# Patient Record
Sex: MALE | Race: BLACK OR AFRICAN AMERICAN | Employment: FULL TIME | URBAN - METROPOLITAN AREA
[De-identification: names, ages, dates, MRNs, and addresses within clinical notes are randomized per-mention and may not be internally consistent; named-entity substitution may affect disease eponyms.]

---

## 2017-05-16 LAB
ALBUMIN SERPL-MCNC: 4.3 G/DL
ALP BLD-CCNC: 70 U/L
ALT SERPL-CCNC: 14 U/L
AST SERPL-CCNC: 15 U/L
BILIRUB SERPL-MCNC: 0.4 MG/DL (ref 0.1–1.4)
BUN BLDV-MCNC: 25 MG/DL
CALCIUM SERPL-MCNC: 9.9 MG/DL
CHLORIDE BLD-SCNC: 94 MMOL/L
CHOLESTEROL, TOTAL: 237 MG/DL
CHOLESTEROL/HDL RATIO: 4.6
CO2: NORMAL MMOL/L
CREAT SERPL-MCNC: 1.49 MG/DL
GFR CALCULATED: NORMAL
GLUCOSE BLD-MCNC: 184 MG/DL
HBA1C MFR BLD: 7.2 %
HDLC SERPL-MCNC: 52 MG/DL (ref 35–70)
LDL CHOLESTEROL CALCULATED: 160 MG/DL (ref 0–160)
POTASSIUM SERPL-SCNC: 4.8 MMOL/L
PROSTATE SPECIFIC ANTIGEN: 16.2 NG/ML
SODIUM BLD-SCNC: 141 MMOL/L
TOTAL PROTEIN: 7.1
TRIGL SERPL-MCNC: 124 MG/DL
URIC ACID: 9.4
VLDLC SERPL CALC-MCNC: 25 MG/DL

## 2017-07-14 ENCOUNTER — OFFICE VISIT (OUTPATIENT)
Dept: FAMILY MEDICINE CLINIC | Age: 57
End: 2017-07-14

## 2017-07-14 VITALS
BODY MASS INDEX: 28.58 KG/M2 | HEART RATE: 100 BPM | DIASTOLIC BLOOD PRESSURE: 70 MMHG | SYSTOLIC BLOOD PRESSURE: 124 MMHG | OXYGEN SATURATION: 95 % | HEIGHT: 70 IN | RESPIRATION RATE: 15 BRPM | WEIGHT: 199.6 LBS

## 2017-07-14 DIAGNOSIS — E11.9 TYPE 2 DIABETES MELLITUS WITHOUT COMPLICATION, WITHOUT LONG-TERM CURRENT USE OF INSULIN (HCC): Primary | ICD-10-CM

## 2017-07-14 DIAGNOSIS — E78.00 PURE HYPERCHOLESTEROLEMIA: Chronic | ICD-10-CM

## 2017-07-14 DIAGNOSIS — Z87.891 FORMER SMOKER: ICD-10-CM

## 2017-07-14 DIAGNOSIS — Z76.89 ENCOUNTER TO ESTABLISH CARE: ICD-10-CM

## 2017-07-14 DIAGNOSIS — Z91.199 MEDICAL NON-COMPLIANCE: ICD-10-CM

## 2017-07-14 DIAGNOSIS — I71.40 ABDOMINAL AORTIC ANEURYSM (AAA) WITHOUT RUPTURE: ICD-10-CM

## 2017-07-14 DIAGNOSIS — Z12.11 SCREENING FOR COLON CANCER: ICD-10-CM

## 2017-07-14 DIAGNOSIS — I10 ESSENTIAL HYPERTENSION: ICD-10-CM

## 2017-07-14 LAB
CREATININE URINE POCT: 300
MICROALBUMIN/CREAT 24H UR: 150 MG/G{CREAT}
MICROALBUMIN/CREAT UR-RTO: NORMAL

## 2017-07-14 PROCEDURE — 82044 UR ALBUMIN SEMIQUANTITATIVE: CPT | Performed by: FAMILY MEDICINE

## 2017-07-14 PROCEDURE — 90715 TDAP VACCINE 7 YRS/> IM: CPT | Performed by: FAMILY MEDICINE

## 2017-07-14 PROCEDURE — 99204 OFFICE O/P NEW MOD 45 MIN: CPT | Performed by: FAMILY MEDICINE

## 2017-07-14 PROCEDURE — 90732 PPSV23 VACC 2 YRS+ SUBQ/IM: CPT | Performed by: FAMILY MEDICINE

## 2017-07-14 PROCEDURE — 90472 IMMUNIZATION ADMIN EACH ADD: CPT | Performed by: FAMILY MEDICINE

## 2017-07-14 PROCEDURE — 90471 IMMUNIZATION ADMIN: CPT | Performed by: FAMILY MEDICINE

## 2017-07-14 RX ORDER — SIMVASTATIN 20 MG
TABLET ORAL
COMMUNITY
Start: 2016-04-05 | End: 2017-10-19 | Stop reason: SDUPTHER

## 2017-07-14 RX ORDER — MELOXICAM 15 MG/1
15 TABLET ORAL
COMMUNITY
Start: 2015-09-23 | End: 2018-10-10 | Stop reason: ALTCHOICE

## 2017-07-14 ASSESSMENT — PATIENT HEALTH QUESTIONNAIRE - PHQ9
1. LITTLE INTEREST OR PLEASURE IN DOING THINGS: 0
SUM OF ALL RESPONSES TO PHQ QUESTIONS 1-9: 0
SUM OF ALL RESPONSES TO PHQ9 QUESTIONS 1 & 2: 0
2. FEELING DOWN, DEPRESSED OR HOPELESS: 0

## 2017-09-22 ENCOUNTER — TELEPHONE (OUTPATIENT)
Dept: FAMILY MEDICINE CLINIC | Age: 57
End: 2017-09-22

## 2017-09-25 RX ORDER — HYDROCHLOROTHIAZIDE 25 MG/1
25 TABLET ORAL DAILY
Qty: 30 TABLET | Refills: 5 | Status: SHIPPED | OUTPATIENT
Start: 2017-09-25 | End: 2018-03-15 | Stop reason: SDUPTHER

## 2017-10-02 ENCOUNTER — TELEPHONE (OUTPATIENT)
Dept: FAMILY MEDICINE CLINIC | Age: 57
End: 2017-10-02

## 2017-10-02 NOTE — TELEPHONE ENCOUNTER
Patient received a letter dated 9/25/17 asking him to call the office but it doesn't say what it is regards to. Can someone help him out?

## 2017-10-02 NOTE — TELEPHONE ENCOUNTER
Looks like we tried to reach him 3x in regards to his tramadol rx.  Looks like Dr. Larsen Overall must fill that for him   Refer to the encounter on 9/15/17

## 2017-10-12 ENCOUNTER — HOSPITAL ENCOUNTER (OUTPATIENT)
Dept: ENDOSCOPY | Age: 57
Discharge: HOME | End: 2017-10-12
Admitting: INTERNAL MEDICINE

## 2017-10-12 LAB
GLUCOSE BLD-MCNC: 125 MG/DL (ref 70–99)
PERFORMED ON: ABNORMAL

## 2017-10-12 NOTE — ANESTHESIA PRE-OP
Department of Anesthesiology  Preprocedure Note       Name:  Abby Madrigal   Age:  59 y.o.  :  1960                                          MRN:  [id number]         Date:  10/12/2017      Surgeon:    Procedure:    Medications prior to admission:   Prior to Admission medications    Medication Sig Start Date End Date Taking? Authorizing Provider   metFORMIN (GLUCOPHAGE) 500 MG tablet Take 1 tablet by mouth 2 times daily (with meals) 17   Jose Sheehan MD   hydrochlorothiazide (HYDRODIURIL) 25 MG tablet Take 1 tablet by mouth daily 17   Jose Sheehan MD   simvastatin (ZOCOR) 20 MG tablet TAKE ONE TABLET BY MOUTH EVERY NIGHT AT BEDTIME 16   Historical Provider, MD   meloxicam (MOBIC) 15 MG tablet Take 15 mg by mouth 9/23/15   Historical Provider, MD   lisinopril (PRINIVIL;ZESTRIL) 30 MG tablet Take 30 mg by mouth daily    Historical Provider, MD   traMADol (ULTRAM) 50 MG tablet Take 50 mg by mouth every 6 hours as needed. Historical Provider, MD       Current medications:    Current Outpatient Prescriptions   Medication Sig Dispense Refill    metFORMIN (GLUCOPHAGE) 500 MG tablet Take 1 tablet by mouth 2 times daily (with meals) 60 tablet 5    hydrochlorothiazide (HYDRODIURIL) 25 MG tablet Take 1 tablet by mouth daily 30 tablet 5    simvastatin (ZOCOR) 20 MG tablet TAKE ONE TABLET BY MOUTH EVERY NIGHT AT BEDTIME      meloxicam (MOBIC) 15 MG tablet Take 15 mg by mouth      lisinopril (PRINIVIL;ZESTRIL) 30 MG tablet Take 30 mg by mouth daily      traMADol (ULTRAM) 50 MG tablet Take 50 mg by mouth every 6 hours as needed. No current facility-administered medications for this encounter.         Allergies:  No Known Allergies    Problem List:    Patient Active Problem List   Diagnosis Code    AAA (abdominal aortic aneurysm) ([facility] I71.4    Type 2 diabetes mellitus without complication ([facility] [facility] P60.2    Hypertension I10    Hyperlipidemia E78.5    Former smoker Z87.891   [employer] non-compliance Z91.19       Past Medical History:        Diagnosis Date    AAA (abdominal aortic aneurysm) ([facility] [address] 11/28/2015    4.2 cm    Arthritis     Back pain     Hyperlipidemia     Hypertension     Type 2 diabetes mellitus without complication [facility])        Past Surgical History:        Procedure Laterality Date    CORONARY ANGIOPLASTY  2013    UPPER GASTROINTESTINAL ENDOSCOPY  11/30/15    with gastric biopsy    URETHRAL STRICTURE DILATATION  5/2017, 7/2017    Dr. Tiffany Mak       Social History:    Social History   Substance Use Topics    Smoking status: Former Smoker     Quit date: 11/29/2009    Smokeless tobacco: Never Used    Alcohol use No                                Counseling given: Not Answered      Vital Signs (Current): There were no vitals filed for this visit. BP Readings from Last 3 Encounters:   07/14/17 124/70   12/02/15 139/79   11/28/15 130/69       NPO Status:                                                                                 BMI:   Wt Readings from Last 3 Encounters:   09/19/17 200 lb (90.7 kg)   07/14/17 199 lb 9.6 oz (90.5 kg)   12/01/15 201 lb 8 oz (91.4 kg)     There is no height or weight on file to calculate BMI. Anesthesia Evaluation  Patient summary reviewed and Nursing notes reviewed  Airway:         Dental:          Pulmonary:                              Cardiovascular:  Exercise tolerance: good (>4 METS),   (+) hypertension: mild,                   Neuro/Psych:               GI/Hepatic/Renal:             Endo/Other:    (+) Type II DM, ,                     Comments: AA 4.2 CM Abdominal:           Vascular:   + PVD, aortic or cerebral, . Anesthesia Plan      MAC     ASA 3             Anesthetic plan and risks discussed with patient. Plan discussed with CRNA.                   Shannan Quiles MD   10/12/2017

## 2017-10-12 NOTE — ANESTHESIA POST-OP
Anesthesia Post-op Note    Patient: Angela Nguyen  MRN: [id number]  YOB: 1960  Date of evaluation: 10/12/2017  Time:  1:18 PM     Procedure(s) Performed:     Last Vitals: There were no vitals taken for this visit.     Washington Phase I:      Washington Phase II:      Anesthesia Post Evaluation    Final anesthesia type: MAC  Patient location during evaluation: outpatient unit  Level of consciousness: awake and alert  Complications: no  Hydration status: euvolemic        Mai Mccoy MD  1:18 PM

## 2017-10-19 ENCOUNTER — OFFICE VISIT (OUTPATIENT)
Dept: FAMILY MEDICINE CLINIC | Age: 57
End: 2017-10-19

## 2017-10-19 VITALS
WEIGHT: 202.6 LBS | BODY MASS INDEX: 29.92 KG/M2 | DIASTOLIC BLOOD PRESSURE: 70 MMHG | OXYGEN SATURATION: 98 % | RESPIRATION RATE: 15 BRPM | HEART RATE: 69 BPM | SYSTOLIC BLOOD PRESSURE: 126 MMHG

## 2017-10-19 DIAGNOSIS — R97.20 ELEVATED PSA: ICD-10-CM

## 2017-10-19 DIAGNOSIS — I10 ESSENTIAL HYPERTENSION: ICD-10-CM

## 2017-10-19 DIAGNOSIS — G63 POLYNEUROPATHY ASSOCIATED WITH UNDERLYING DISEASE (HCC): ICD-10-CM

## 2017-10-19 DIAGNOSIS — I71.40 ABDOMINAL AORTIC ANEURYSM (AAA) WITHOUT RUPTURE: ICD-10-CM

## 2017-10-19 DIAGNOSIS — E78.5 HYPERLIPIDEMIA, UNSPECIFIED HYPERLIPIDEMIA TYPE: ICD-10-CM

## 2017-10-19 DIAGNOSIS — E11.42 TYPE 2 DIABETES MELLITUS WITH DIABETIC POLYNEUROPATHY, WITHOUT LONG-TERM CURRENT USE OF INSULIN (HCC): Primary | ICD-10-CM

## 2017-10-19 LAB — HBA1C MFR BLD: 7.4 %

## 2017-10-19 PROCEDURE — 99214 OFFICE O/P EST MOD 30 MIN: CPT | Performed by: FAMILY MEDICINE

## 2017-10-19 PROCEDURE — 83036 HEMOGLOBIN GLYCOSYLATED A1C: CPT | Performed by: FAMILY MEDICINE

## 2017-10-19 RX ORDER — LISINOPRIL 30 MG/1
30 TABLET ORAL DAILY
Qty: 30 TABLET | Refills: 5 | Status: SHIPPED | OUTPATIENT
Start: 2017-10-19 | End: 2018-03-15 | Stop reason: SDUPTHER

## 2017-10-19 RX ORDER — SIMVASTATIN 20 MG
TABLET ORAL
Qty: 30 TABLET | Refills: 5 | Status: SHIPPED | OUTPATIENT
Start: 2017-10-19 | End: 2018-03-15 | Stop reason: SDUPTHER

## 2017-12-04 DIAGNOSIS — R97.20 ELEVATED PSA: ICD-10-CM

## 2017-12-04 DIAGNOSIS — E11.42 TYPE 2 DIABETES MELLITUS WITH DIABETIC POLYNEUROPATHY, WITHOUT LONG-TERM CURRENT USE OF INSULIN (HCC): ICD-10-CM

## 2017-12-04 DIAGNOSIS — E78.5 HYPERLIPIDEMIA, UNSPECIFIED HYPERLIPIDEMIA TYPE: ICD-10-CM

## 2017-12-04 DIAGNOSIS — I10 ESSENTIAL HYPERTENSION: ICD-10-CM

## 2017-12-04 LAB
A/G RATIO: 1.7 (ref 1.1–2.2)
ALBUMIN SERPL-MCNC: 4.1 G/DL (ref 3.4–5)
ALP BLD-CCNC: 59 U/L (ref 40–129)
ALT SERPL-CCNC: 15 U/L (ref 10–40)
ANION GAP SERPL CALCULATED.3IONS-SCNC: 14 MMOL/L (ref 3–16)
AST SERPL-CCNC: 16 U/L (ref 15–37)
BASOPHILS ABSOLUTE: 0.1 K/UL (ref 0–0.2)
BASOPHILS RELATIVE PERCENT: 1 %
BILIRUB SERPL-MCNC: <0.2 MG/DL (ref 0–1)
BUN BLDV-MCNC: 26 MG/DL (ref 7–20)
CALCIUM SERPL-MCNC: 9.2 MG/DL (ref 8.3–10.6)
CHLORIDE BLD-SCNC: 101 MMOL/L (ref 99–110)
CHOLESTEROL, TOTAL: 215 MG/DL (ref 0–199)
CO2: 27 MMOL/L (ref 21–32)
CREAT SERPL-MCNC: 0.9 MG/DL (ref 0.9–1.3)
EOSINOPHILS ABSOLUTE: 0.2 K/UL (ref 0–0.6)
EOSINOPHILS RELATIVE PERCENT: 2.1 %
GFR AFRICAN AMERICAN: >60
GFR NON-AFRICAN AMERICAN: >60
GLOBULIN: 2.4 G/DL
GLUCOSE BLD-MCNC: 156 MG/DL (ref 70–99)
HCT VFR BLD CALC: 40.4 % (ref 40.5–52.5)
HDLC SERPL-MCNC: 45 MG/DL (ref 40–60)
HEMOGLOBIN: 13.2 G/DL (ref 13.5–17.5)
LDL CHOLESTEROL CALCULATED: 140 MG/DL
LYMPHOCYTES ABSOLUTE: 3.2 K/UL (ref 1–5.1)
LYMPHOCYTES RELATIVE PERCENT: 38.8 %
MCH RBC QN AUTO: 28.3 PG (ref 26–34)
MCHC RBC AUTO-ENTMCNC: 32.7 G/DL (ref 31–36)
MCV RBC AUTO: 86.7 FL (ref 80–100)
MONOCYTES ABSOLUTE: 0.6 K/UL (ref 0–1.3)
MONOCYTES RELATIVE PERCENT: 7.3 %
NEUTROPHILS ABSOLUTE: 4.2 K/UL (ref 1.7–7.7)
NEUTROPHILS RELATIVE PERCENT: 50.8 %
PDW BLD-RTO: 15.3 % (ref 12.4–15.4)
PLATELET # BLD: 170 K/UL (ref 135–450)
PMV BLD AUTO: 11 FL (ref 5–10.5)
POTASSIUM SERPL-SCNC: 5 MMOL/L (ref 3.5–5.1)
PROSTATE SPECIFIC ANTIGEN: 0.89 NG/ML (ref 0–4)
RBC # BLD: 4.66 M/UL (ref 4.2–5.9)
SODIUM BLD-SCNC: 142 MMOL/L (ref 136–145)
TOTAL PROTEIN: 6.5 G/DL (ref 6.4–8.2)
TRIGL SERPL-MCNC: 152 MG/DL (ref 0–150)
VLDLC SERPL CALC-MCNC: 30 MG/DL
WBC # BLD: 8.2 K/UL (ref 4–11)

## 2017-12-05 DIAGNOSIS — E11.42 TYPE 2 DIABETES MELLITUS WITH DIABETIC POLYNEUROPATHY, WITHOUT LONG-TERM CURRENT USE OF INSULIN (HCC): Primary | ICD-10-CM

## 2017-12-05 LAB
ESTIMATED AVERAGE GLUCOSE: 159.9 MG/DL
HBA1C MFR BLD: 7.2 %

## 2018-03-15 ENCOUNTER — OFFICE VISIT (OUTPATIENT)
Dept: FAMILY MEDICINE CLINIC | Age: 58
End: 2018-03-15

## 2018-03-15 VITALS
SYSTOLIC BLOOD PRESSURE: 152 MMHG | DIASTOLIC BLOOD PRESSURE: 76 MMHG | WEIGHT: 201.8 LBS | HEART RATE: 74 BPM | BODY MASS INDEX: 29.8 KG/M2 | OXYGEN SATURATION: 97 %

## 2018-03-15 DIAGNOSIS — I71.40 ABDOMINAL AORTIC ANEURYSM (AAA) WITHOUT RUPTURE: ICD-10-CM

## 2018-03-15 DIAGNOSIS — Z91.199 MEDICAL NON-COMPLIANCE: ICD-10-CM

## 2018-03-15 DIAGNOSIS — E11.42 TYPE 2 DIABETES MELLITUS WITH DIABETIC POLYNEUROPATHY, WITHOUT LONG-TERM CURRENT USE OF INSULIN (HCC): Primary | ICD-10-CM

## 2018-03-15 DIAGNOSIS — E78.5 HYPERLIPIDEMIA, UNSPECIFIED HYPERLIPIDEMIA TYPE: ICD-10-CM

## 2018-03-15 DIAGNOSIS — I10 ESSENTIAL HYPERTENSION: ICD-10-CM

## 2018-03-15 PROBLEM — R97.20 ELEVATED PSA: Status: RESOLVED | Noted: 2017-10-19 | Resolved: 2018-03-15

## 2018-03-15 LAB — HBA1C MFR BLD: 6.6 %

## 2018-03-15 PROCEDURE — 83036 HEMOGLOBIN GLYCOSYLATED A1C: CPT | Performed by: FAMILY MEDICINE

## 2018-03-15 PROCEDURE — 99214 OFFICE O/P EST MOD 30 MIN: CPT | Performed by: FAMILY MEDICINE

## 2018-03-15 RX ORDER — LISINOPRIL 40 MG/1
40 TABLET ORAL DAILY
Qty: 30 TABLET | Refills: 5 | Status: SHIPPED | OUTPATIENT
Start: 2018-03-15 | End: 2018-08-15 | Stop reason: SDUPTHER

## 2018-03-15 RX ORDER — HYDROCHLOROTHIAZIDE 25 MG/1
25 TABLET ORAL DAILY
Qty: 30 TABLET | Refills: 5 | Status: SHIPPED | OUTPATIENT
Start: 2018-03-15 | End: 2018-08-15 | Stop reason: SDUPTHER

## 2018-03-15 RX ORDER — SIMVASTATIN 20 MG
TABLET ORAL
Qty: 30 TABLET | Refills: 5 | Status: SHIPPED | OUTPATIENT
Start: 2018-03-15 | End: 2018-08-15 | Stop reason: SDUPTHER

## 2018-03-15 NOTE — PROGRESS NOTES
had AAA imaging in 2.5 years. Discussed importance of f/u imaging and size/prognosis of his AAA. Pt to do CT asap  5.  Continue ASA daily

## 2018-03-19 ENCOUNTER — TELEPHONE (OUTPATIENT)
Dept: FAMILY MEDICINE CLINIC | Age: 58
End: 2018-03-19

## 2018-03-19 DIAGNOSIS — I10 ESSENTIAL HYPERTENSION: Primary | ICD-10-CM

## 2018-03-28 ENCOUNTER — HOSPITAL ENCOUNTER (OUTPATIENT)
Dept: OTHER | Age: 58
Discharge: HOME | End: 2018-03-28
Attending: FAMILY MEDICINE | Admitting: FAMILY MEDICINE

## 2018-03-28 DIAGNOSIS — I71.40 ABDOMINAL AORTIC ANEURYSM (AAA) WITHOUT RUPTURE: ICD-10-CM

## 2018-03-28 DIAGNOSIS — I70.0 ATHEROSCLEROSIS OF AORTA (HCC): ICD-10-CM

## 2018-03-28 DIAGNOSIS — I71.40 ABDOMINAL AORTIC ANEURYSM (AAA) WITHOUT RUPTURE: Primary | ICD-10-CM

## 2018-03-28 DIAGNOSIS — I71.23 ANEURYSM OF DESCENDING THORACIC AORTA: ICD-10-CM

## 2018-03-28 LAB
CREAT SERPL-MCNC: 0.9 MG/DL (ref 0.8–1.3)
GFR AFRICAN AMERICAN: >60
GFR NON-AFRICAN AMERICAN: >60

## 2018-04-20 ENCOUNTER — OFFICE VISIT (OUTPATIENT)
Dept: VASCULAR SURGERY | Age: 58
End: 2018-04-20

## 2018-04-20 VITALS
SYSTOLIC BLOOD PRESSURE: 128 MMHG | WEIGHT: 198 LBS | BODY MASS INDEX: 28.35 KG/M2 | DIASTOLIC BLOOD PRESSURE: 80 MMHG | HEIGHT: 70 IN

## 2018-04-20 DIAGNOSIS — I71.40 AAA (ABDOMINAL AORTIC ANEURYSM) WITHOUT RUPTURE: Primary | ICD-10-CM

## 2018-04-20 PROCEDURE — 99203 OFFICE O/P NEW LOW 30 MIN: CPT | Performed by: SURGERY

## 2018-05-07 LAB
ALBUMIN SERPL-MCNC: 4.4 G/DL
ALP BLD-CCNC: 59 U/L
ALT SERPL-CCNC: 16 U/L
ANION GAP SERPL CALCULATED.3IONS-SCNC: NORMAL MMOL/L
AST SERPL-CCNC: 14 U/L
AVERAGE GLUCOSE: NORMAL
BASOPHILS ABSOLUTE: ABNORMAL /ΜL
BASOPHILS RELATIVE PERCENT: ABNORMAL %
BILIRUB SERPL-MCNC: 0.4 MG/DL (ref 0.1–1.4)
BUN BLDV-MCNC: 22 MG/DL
CALCIUM SERPL-MCNC: 9.9 MG/DL
CHLORIDE BLD-SCNC: 99 MMOL/L
CHOLESTEROL, TOTAL: 193 MG/DL
CHOLESTEROL/HDL RATIO: 3.7
CO2: NORMAL MMOL/L
CREAT SERPL-MCNC: 1.04 MG/DL
EOSINOPHILS ABSOLUTE: ABNORMAL /ΜL
EOSINOPHILS RELATIVE PERCENT: ABNORMAL %
GFR CALCULATED: NORMAL
GLUCOSE BLD-MCNC: 150 MG/DL
HBA1C MFR BLD: 6.6 %
HCT VFR BLD CALC: 37.9 % (ref 41–53)
HDLC SERPL-MCNC: 52 MG/DL (ref 35–70)
HEMOGLOBIN: 12.4 G/DL (ref 13.5–17.5)
IRON: 27
LDL CHOLESTEROL CALCULATED: 121 MG/DL (ref 0–160)
LYMPHOCYTES ABSOLUTE: ABNORMAL /ΜL
LYMPHOCYTES RELATIVE PERCENT: ABNORMAL %
MCH RBC QN AUTO: 28.2 PG
MCHC RBC AUTO-ENTMCNC: 32.7 G/DL
MCV RBC AUTO: 86 FL
MONOCYTES ABSOLUTE: ABNORMAL /ΜL
MONOCYTES RELATIVE PERCENT: ABNORMAL %
NEUTROPHILS ABSOLUTE: ABNORMAL /ΜL
NEUTROPHILS RELATIVE PERCENT: ABNORMAL %
PLATELET # BLD: 198 K/ΜL
PMV BLD AUTO: ABNORMAL FL
POTASSIUM SERPL-SCNC: 4.5 MMOL/L
PROSTATE SPECIFIC ANTIGEN: 23.4 NG/ML
RBC # BLD: 4.39 10^6/ΜL
SODIUM BLD-SCNC: 140 MMOL/L
TOTAL PROTEIN: 6.9
TRIGL SERPL-MCNC: 99 MG/DL
TSH SERPL DL<=0.05 MIU/L-ACNC: 0.95 UIU/ML
URIC ACID: 6.8
VLDLC SERPL CALC-MCNC: 20 MG/DL
WBC # BLD: 12.5 10^3/ML

## 2018-05-17 ENCOUNTER — OFFICE VISIT (OUTPATIENT)
Dept: FAMILY MEDICINE CLINIC | Age: 58
End: 2018-05-17

## 2018-05-17 VITALS
WEIGHT: 200.2 LBS | SYSTOLIC BLOOD PRESSURE: 130 MMHG | BODY MASS INDEX: 28.73 KG/M2 | DIASTOLIC BLOOD PRESSURE: 72 MMHG | HEART RATE: 92 BPM | OXYGEN SATURATION: 97 %

## 2018-05-17 DIAGNOSIS — Z00.00 ANNUAL PHYSICAL EXAM: Primary | ICD-10-CM

## 2018-05-17 DIAGNOSIS — E78.5 HYPERLIPIDEMIA, UNSPECIFIED HYPERLIPIDEMIA TYPE: ICD-10-CM

## 2018-05-17 DIAGNOSIS — R97.20 ELEVATED PSA: ICD-10-CM

## 2018-05-17 DIAGNOSIS — E11.42 TYPE 2 DIABETES MELLITUS WITH DIABETIC POLYNEUROPATHY, WITHOUT LONG-TERM CURRENT USE OF INSULIN (HCC): ICD-10-CM

## 2018-05-17 DIAGNOSIS — I10 ESSENTIAL HYPERTENSION: ICD-10-CM

## 2018-05-17 DIAGNOSIS — N41.0 ACUTE PROSTATITIS: ICD-10-CM

## 2018-05-17 DIAGNOSIS — I71.40 ABDOMINAL AORTIC ANEURYSM (AAA) WITHOUT RUPTURE: ICD-10-CM

## 2018-05-17 LAB
BILIRUBIN, POC: NORMAL
BLOOD URINE, POC: NORMAL
CLARITY, POC: CLEAR
COLOR, POC: YELLOW
GLUCOSE URINE, POC: NORMAL
KETONES, POC: NORMAL
LEUKOCYTE EST, POC: NORMAL
NITRITE, POC: NORMAL
PH, POC: 6
PROTEIN, POC: NORMAL
SPECIFIC GRAVITY, POC: >=1.03
UROBILINOGEN, POC: 0.2

## 2018-05-17 PROCEDURE — 99214 OFFICE O/P EST MOD 30 MIN: CPT | Performed by: FAMILY MEDICINE

## 2018-05-17 PROCEDURE — 81002 URINALYSIS NONAUTO W/O SCOPE: CPT | Performed by: FAMILY MEDICINE

## 2018-05-17 PROCEDURE — 99396 PREV VISIT EST AGE 40-64: CPT | Performed by: FAMILY MEDICINE

## 2018-05-17 RX ORDER — CIPROFLOXACIN 500 MG/1
500 TABLET, FILM COATED ORAL 2 TIMES DAILY
Qty: 20 TABLET | Refills: 0 | Status: SHIPPED | OUTPATIENT
Start: 2018-05-17 | End: 2018-05-27

## 2018-08-15 ENCOUNTER — OFFICE VISIT (OUTPATIENT)
Dept: FAMILY MEDICINE CLINIC | Age: 58
End: 2018-08-15

## 2018-08-15 VITALS
TEMPERATURE: 98.1 F | SYSTOLIC BLOOD PRESSURE: 120 MMHG | WEIGHT: 196 LBS | HEART RATE: 86 BPM | DIASTOLIC BLOOD PRESSURE: 80 MMHG | BODY MASS INDEX: 28.12 KG/M2 | OXYGEN SATURATION: 95 %

## 2018-08-15 DIAGNOSIS — R35.1 NOCTURIA: ICD-10-CM

## 2018-08-15 DIAGNOSIS — I10 ESSENTIAL HYPERTENSION: ICD-10-CM

## 2018-08-15 DIAGNOSIS — R30.0 DYSURIA: Primary | ICD-10-CM

## 2018-08-15 DIAGNOSIS — E78.5 HYPERLIPIDEMIA, UNSPECIFIED HYPERLIPIDEMIA TYPE: ICD-10-CM

## 2018-08-15 DIAGNOSIS — R97.20 ELEVATED PSA: ICD-10-CM

## 2018-08-15 PROCEDURE — 99214 OFFICE O/P EST MOD 30 MIN: CPT | Performed by: FAMILY MEDICINE

## 2018-08-15 RX ORDER — SIMVASTATIN 20 MG
TABLET ORAL
Qty: 30 TABLET | Refills: 5 | Status: SHIPPED | OUTPATIENT
Start: 2018-08-15 | End: 2018-10-10 | Stop reason: ALTCHOICE

## 2018-08-15 RX ORDER — SULFAMETHOXAZOLE AND TRIMETHOPRIM 800; 160 MG/1; MG/1
1 TABLET ORAL 2 TIMES DAILY
Qty: 84 TABLET | Refills: 0 | Status: SHIPPED | OUTPATIENT
Start: 2018-08-15 | End: 2018-08-28

## 2018-08-15 RX ORDER — LISINOPRIL 40 MG/1
40 TABLET ORAL DAILY
Qty: 30 TABLET | Refills: 5 | Status: SHIPPED | OUTPATIENT
Start: 2018-08-15 | End: 2018-09-17 | Stop reason: SDUPTHER

## 2018-08-15 RX ORDER — HYDROCHLOROTHIAZIDE 25 MG/1
25 TABLET ORAL DAILY
Qty: 30 TABLET | Refills: 5 | Status: SHIPPED | OUTPATIENT
Start: 2018-08-15 | End: 2018-09-17 | Stop reason: SDUPTHER

## 2018-08-15 ASSESSMENT — PATIENT HEALTH QUESTIONNAIRE - PHQ9
SUM OF ALL RESPONSES TO PHQ9 QUESTIONS 1 & 2: 0
SUM OF ALL RESPONSES TO PHQ QUESTIONS 1-9: 0
1. LITTLE INTEREST OR PLEASURE IN DOING THINGS: 0
2. FEELING DOWN, DEPRESSED OR HOPELESS: 0
SUM OF ALL RESPONSES TO PHQ QUESTIONS 1-9: 0

## 2018-08-15 NOTE — PATIENT INSTRUCTIONS
Patient Education        Prostatitis: Care Instructions  Your Care Instructions    The prostate gland is a small, walnut-shaped organ. It lies just below a man's bladder. It surrounds the urethra, the tube that carries urine through the penis and out of the body. Prostatitis is a painful condition caused by inflammation or infection of the prostate gland. Sometimes the condition is caused by bacteria, but often the cause is not known. Prostatitis caused by bacteria usually is treated with self-care and antibiotics. Follow-up care is a key part of your treatment and safety. Be sure to make and go to all appointments, and call your doctor if you are having problems. It's also a good idea to know your test results and keep a list of the medicines you take. How can you care for yourself at home? · If your doctor prescribed antibiotics, take them as directed. Do not stop taking them just because you feel better. You need to take the full course of antibiotics. · Take an over-the-counter pain medicine, such as acetaminophen (Tylenol), ibuprofen (Advil, Motrin), or naproxen (Aleve). Be safe with medicines. Read and follow all instructions on the label. · Take warm baths to help soothe pain. · Straining to pass stools can hurt when your prostate is inflamed. Avoid constipation. ¨ Include fruits, vegetables, beans, and whole grains in your diet each day. These foods are high in fiber. ¨ Drink plenty of fluids, enough so that your urine is light yellow or clear like water. If you have kidney, heart, or liver disease and have to limit fluids, talk with your doctor before you increase the amount of fluids you drink. ¨ Get some exercise every day. Build up slowly to 30 to 60 minutes a day on 5 or more days of the week. ¨ Take a fiber supplement, such as Citrucel or Metamucil, every day if needed. Read and follow all instructions on the label. ¨ Schedule time each day for a bowel movement.  Having a daily routine may

## 2018-08-21 DIAGNOSIS — R35.1 NOCTURIA: ICD-10-CM

## 2018-08-21 DIAGNOSIS — R97.20 ELEVATED PSA: ICD-10-CM

## 2018-08-21 DIAGNOSIS — R30.0 DYSURIA: ICD-10-CM

## 2018-08-23 LAB
PROSTATE SPECIFIC ANTIGEN FREE: 0.5 UG/L
PROSTATE SPECIFIC ANTIGEN PERCENT FREE: 13.2 %
PROSTATE SPECIFIC ANTIGEN: 3.8 UG/L (ref 0–4)

## 2018-08-28 ENCOUNTER — OFFICE VISIT (OUTPATIENT)
Dept: FAMILY MEDICINE CLINIC | Age: 58
End: 2018-08-28

## 2018-08-28 ENCOUNTER — TELEPHONE (OUTPATIENT)
Dept: FAMILY MEDICINE CLINIC | Age: 58
End: 2018-08-28

## 2018-08-28 VITALS
TEMPERATURE: 97.9 F | OXYGEN SATURATION: 95 % | RESPIRATION RATE: 15 BRPM | SYSTOLIC BLOOD PRESSURE: 118 MMHG | WEIGHT: 186.4 LBS | DIASTOLIC BLOOD PRESSURE: 78 MMHG | BODY MASS INDEX: 26.75 KG/M2 | HEART RATE: 84 BPM

## 2018-08-28 DIAGNOSIS — K52.9 ACUTE GASTROENTERITIS: ICD-10-CM

## 2018-08-28 DIAGNOSIS — R11.2 NON-INTRACTABLE VOMITING WITH NAUSEA, UNSPECIFIED VOMITING TYPE: Primary | ICD-10-CM

## 2018-08-28 PROCEDURE — 99214 OFFICE O/P EST MOD 30 MIN: CPT | Performed by: FAMILY MEDICINE

## 2018-08-28 RX ORDER — ONDANSETRON 8 MG/1
8 TABLET, ORALLY DISINTEGRATING ORAL EVERY 8 HOURS PRN
Qty: 15 TABLET | Refills: 0 | Status: SHIPPED | OUTPATIENT
Start: 2018-08-28 | End: 2018-09-17

## 2018-08-28 RX ORDER — SULFAMETHOXAZOLE AND TRIMETHOPRIM 800; 160 MG/1; MG/1
1 TABLET ORAL 2 TIMES DAILY
COMMUNITY
End: 2018-09-17

## 2018-08-28 NOTE — PROGRESS NOTES
tablet by mouth every 8 hours as needed for Nausea or Vomiting  Dispense: 15 tablet; Refill: 0    2. Acute gastroenteritis        No orders of the defined types were placed in this encounter. Return if symptoms worsen or fail to improve.     Jaiden Cortse MD    8/28/2018  12:04 PM

## 2018-08-28 NOTE — TELEPHONE ENCOUNTER
Call center transferred red flag. Pt is calling says that he has been vomiting for the past four days. I schedule appt with Dr. Raquel Bond today at 11:45 am. Pt expressed understanding.

## 2018-08-30 ENCOUNTER — TELEPHONE (OUTPATIENT)
Dept: FAMILY MEDICINE CLINIC | Age: 58
End: 2018-08-30

## 2018-08-30 DIAGNOSIS — R11.2 NON-INTRACTABLE VOMITING WITH NAUSEA, UNSPECIFIED VOMITING TYPE: Primary | ICD-10-CM

## 2018-08-30 RX ORDER — METOCLOPRAMIDE 10 MG/1
10 TABLET ORAL 3 TIMES DAILY PRN
Qty: 15 TABLET | Refills: 0 | Status: SHIPPED | OUTPATIENT
Start: 2018-08-30 | End: 2018-09-17 | Stop reason: ALTCHOICE

## 2018-09-17 ENCOUNTER — OFFICE VISIT (OUTPATIENT)
Dept: FAMILY MEDICINE CLINIC | Age: 58
End: 2018-09-17

## 2018-09-17 VITALS
DIASTOLIC BLOOD PRESSURE: 70 MMHG | WEIGHT: 185.6 LBS | SYSTOLIC BLOOD PRESSURE: 144 MMHG | BODY MASS INDEX: 26.63 KG/M2 | OXYGEN SATURATION: 98 % | HEART RATE: 86 BPM

## 2018-09-17 DIAGNOSIS — E11.42 TYPE 2 DIABETES MELLITUS WITH DIABETIC POLYNEUROPATHY, WITHOUT LONG-TERM CURRENT USE OF INSULIN (HCC): ICD-10-CM

## 2018-09-17 DIAGNOSIS — R11.2 NAUSEA AND VOMITING, INTRACTABILITY OF VOMITING NOT SPECIFIED, UNSPECIFIED VOMITING TYPE: Primary | ICD-10-CM

## 2018-09-17 DIAGNOSIS — R30.0 DYSURIA: ICD-10-CM

## 2018-09-17 DIAGNOSIS — I10 ESSENTIAL HYPERTENSION: ICD-10-CM

## 2018-09-17 LAB
BILIRUBIN, POC: NORMAL
BLOOD URINE, POC: NORMAL
CLARITY, POC: CLEAR
COLOR, POC: NORMAL
GLUCOSE URINE, POC: NORMAL
KETONES, POC: NORMAL
LEUKOCYTE EST, POC: NORMAL
NITRITE, POC: NORMAL
PH, POC: 5.5
PROTEIN, POC: 30
SPECIFIC GRAVITY, POC: 1.03
UROBILINOGEN, POC: 0.2

## 2018-09-17 PROCEDURE — 99214 OFFICE O/P EST MOD 30 MIN: CPT | Performed by: FAMILY MEDICINE

## 2018-09-17 PROCEDURE — 81002 URINALYSIS NONAUTO W/O SCOPE: CPT | Performed by: FAMILY MEDICINE

## 2018-09-17 RX ORDER — CIPROFLOXACIN 500 MG/1
500 TABLET, FILM COATED ORAL 2 TIMES DAILY
Qty: 10 TABLET | Refills: 0 | Status: SHIPPED | OUTPATIENT
Start: 2018-09-17 | End: 2018-09-22

## 2018-09-17 RX ORDER — LISINOPRIL 40 MG/1
40 TABLET ORAL DAILY
Qty: 90 TABLET | Refills: 3 | Status: SHIPPED | OUTPATIENT
Start: 2018-09-17 | End: 2019-03-20 | Stop reason: SDUPTHER

## 2018-09-17 RX ORDER — SCOLOPAMINE TRANSDERMAL SYSTEM 1 MG/1
1 PATCH, EXTENDED RELEASE TRANSDERMAL
Qty: 10 PATCH | Refills: 3 | Status: SHIPPED | OUTPATIENT
Start: 2018-09-17 | End: 2018-09-24 | Stop reason: SDUPTHER

## 2018-09-17 RX ORDER — RANITIDINE 150 MG/1
150 TABLET ORAL 2 TIMES DAILY
Qty: 60 TABLET | Refills: 3 | Status: SHIPPED | OUTPATIENT
Start: 2018-09-17 | End: 2018-10-10 | Stop reason: ALTCHOICE

## 2018-09-17 RX ORDER — HYDROCHLOROTHIAZIDE 25 MG/1
25 TABLET ORAL DAILY
Qty: 90 TABLET | Refills: 3 | Status: SHIPPED | OUTPATIENT
Start: 2018-09-17 | End: 2018-10-18 | Stop reason: SDUPTHER

## 2018-09-17 ASSESSMENT — PATIENT HEALTH QUESTIONNAIRE - PHQ9
SUM OF ALL RESPONSES TO PHQ QUESTIONS 1-9: 0
SUM OF ALL RESPONSES TO PHQ9 QUESTIONS 1 & 2: 0
1. LITTLE INTEREST OR PLEASURE IN DOING THINGS: 0
2. FEELING DOWN, DEPRESSED OR HOPELESS: 0
SUM OF ALL RESPONSES TO PHQ QUESTIONS 1-9: 0

## 2018-09-17 NOTE — PROGRESS NOTES
simvastatin (ZOCOR) 20 MG tablet TAKE ONE TABLET BY MOUTH EVERY NIGHT AT BEDTIME 30 tablet 5    hydrochlorothiazide (HYDRODIURIL) 25 MG tablet Take 1 tablet by mouth daily 30 tablet 5    aspirin 81 MG tablet Take 81 mg by mouth daily      metFORMIN (GLUCOPHAGE) 1000 MG tablet Take 1 tablet by mouth 2 times daily (with meals) 60 tablet 11    meloxicam (MOBIC) 15 MG tablet Take 15 mg by mouth      traMADol (ULTRAM) 50 MG tablet Take 50 mg by mouth every 6 hours as needed. No current facility-administered medications for this visit. No Known Allergies    Review of Systems   No rash, no bruise, no SOB, +dysuria at times. +urinary frequency    Vitals:  BP (!) 144/70 (Site: Left Upper Arm, Position: Sitting, Cuff Size: Small Adult)   Pulse 86   Wt 185 lb 9.6 oz (84.2 kg)   SpO2 98%   BMI 26.63 kg/m²     Physical Exam   General:  Well-appearing, NAD, alert, non-toxic  HEENT:  Normocephalic, atraumatic. Pupils equal and round. CHEST/LUNGS: CTAB, no crackles, no wheeze, no rhonchi. Symmetric rise  CARDIOVASCULAR: RRR,  no murmur, no rub  EXTREMETIES: Normal movement of all extremities  SKIN:  No rash, no cellulitis, no bruising, no petechiae/purpura/vesicles/pustules/abscess  PSYCH:  A+O x 3; normal affect  NEURO:  GCS 15, CN2-12 grossly intact, no focal motor/sensory deficits, no cerebellar deficits, normal gait, normal speech      Assessment/Plan     58-year-old male with frequent vomiting. This is of unclear etiology. I'll switch the patient to scopolamine for symptomatic relief. We'll start him on Zantac in case it's GERD related. Will refer to GI. Check MRI of the brain to rule out intracranial process. Pt has dysuria. We'll treat him empirically with cipro for possible UTI. Check urine culture.     Discussed with patient medication/s dosage, instructions, potential S/E, A/R and Drug Interaction  Educational material provided regarding patient's condition  Tylenol or Motrin as needed for pain or fever  Advise to return here if worse or go to nearest ER  Encourage fluids  Pt discharged in stable condition at 18:02      1. Essential hypertension    - hydrochlorothiazide (HYDRODIURIL) 25 MG tablet; Take 1 tablet by mouth daily  Dispense: 90 tablet; Refill: 3  - lisinopril (PRINIVIL;ZESTRIL) 40 MG tablet; Take 1 tablet by mouth daily  Dispense: 90 tablet; Refill: 3    2. Nausea and vomiting, intractability of vomiting not specified, unspecified vomiting type      3. Type 2 diabetes mellitus with diabetic polyneuropathy, without long-term current use of insulin (HCC)    - metFORMIN (GLUCOPHAGE) 1000 MG tablet; Take 1 tablet by mouth 2 times daily (with meals)  Dispense: 180 tablet; Refill: 3      No orders of the defined types were placed in this encounter. No Follow-up on file.     Aniyah Monson MD    9/17/2018  5:41 PM

## 2018-09-18 DIAGNOSIS — R11.2 NAUSEA AND VOMITING, INTRACTABILITY OF VOMITING NOT SPECIFIED, UNSPECIFIED VOMITING TYPE: ICD-10-CM

## 2018-09-19 LAB — URINE CULTURE, ROUTINE: NORMAL

## 2018-10-02 RX ORDER — SCOLOPAMINE TRANSDERMAL SYSTEM 1 MG/1
1 PATCH, EXTENDED RELEASE TRANSDERMAL
Qty: 10 PATCH | Refills: 3 | Status: SHIPPED | OUTPATIENT
Start: 2018-10-02 | End: 2018-10-10 | Stop reason: ALTCHOICE

## 2018-10-10 ENCOUNTER — ANESTHESIA EVENT (OUTPATIENT)
Dept: ENDOSCOPY | Age: 58
End: 2018-10-10
Payer: COMMERCIAL

## 2018-10-12 ENCOUNTER — ANESTHESIA (OUTPATIENT)
Dept: ENDOSCOPY | Age: 58
End: 2018-10-12
Payer: COMMERCIAL

## 2018-10-12 ENCOUNTER — HOSPITAL ENCOUNTER (OUTPATIENT)
Age: 58
Setting detail: OUTPATIENT SURGERY
Discharge: HOME | End: 2018-10-12
Attending: INTERNAL MEDICINE | Admitting: INTERNAL MEDICINE
Payer: COMMERCIAL

## 2018-10-12 VITALS
OXYGEN SATURATION: 100 % | WEIGHT: 190 LBS | HEIGHT: 69 IN | BODY MASS INDEX: 28.14 KG/M2 | RESPIRATION RATE: 16 BRPM | SYSTOLIC BLOOD PRESSURE: 122 MMHG | DIASTOLIC BLOOD PRESSURE: 62 MMHG | TEMPERATURE: 97.2 F | HEART RATE: 54 BPM

## 2018-10-12 VITALS — OXYGEN SATURATION: 100 % | DIASTOLIC BLOOD PRESSURE: 89 MMHG | SYSTOLIC BLOOD PRESSURE: 146 MMHG

## 2018-10-12 LAB
GLUCOSE BLD-MCNC: 104 MG/DL (ref 70–99)
GLUCOSE BLD-MCNC: 111 MG/DL (ref 70–99)
PERFORMED ON: ABNORMAL
PERFORMED ON: ABNORMAL

## 2018-10-12 PROCEDURE — 7100000011 HC PHASE II RECOVERY - ADDTL 15 MIN: Performed by: INTERNAL MEDICINE

## 2018-10-12 PROCEDURE — 3609012400 HC EGD TRANSORAL BIOPSY SINGLE/MULTIPLE: Performed by: INTERNAL MEDICINE

## 2018-10-12 PROCEDURE — 3700000000 HC ANESTHESIA ATTENDED CARE: Performed by: INTERNAL MEDICINE

## 2018-10-12 PROCEDURE — 2580000003 HC RX 258: Performed by: ANESTHESIOLOGY

## 2018-10-12 PROCEDURE — 6360000002 HC RX W HCPCS: Performed by: NURSE ANESTHETIST, CERTIFIED REGISTERED

## 2018-10-12 PROCEDURE — 2580000003 HC RX 258: Performed by: NURSE ANESTHETIST, CERTIFIED REGISTERED

## 2018-10-12 PROCEDURE — 7100000010 HC PHASE II RECOVERY - FIRST 15 MIN: Performed by: INTERNAL MEDICINE

## 2018-10-12 PROCEDURE — 2500000003 HC RX 250 WO HCPCS: Performed by: NURSE ANESTHETIST, CERTIFIED REGISTERED

## 2018-10-12 PROCEDURE — 2709999900 HC NON-CHARGEABLE SUPPLY: Performed by: INTERNAL MEDICINE

## 2018-10-12 PROCEDURE — 6370000000 HC RX 637 (ALT 250 FOR IP): Performed by: INTERNAL MEDICINE

## 2018-10-12 RX ORDER — SODIUM CHLORIDE 0.9 % (FLUSH) 0.9 %
10 SYRINGE (ML) INJECTION EVERY 12 HOURS SCHEDULED
Status: DISCONTINUED | OUTPATIENT
Start: 2018-10-12 | End: 2018-10-12 | Stop reason: HOSPADM

## 2018-10-12 RX ORDER — SODIUM CHLORIDE 0.9 % (FLUSH) 0.9 %
10 SYRINGE (ML) INJECTION PRN
Status: DISCONTINUED | OUTPATIENT
Start: 2018-10-12 | End: 2018-10-12 | Stop reason: HOSPADM

## 2018-10-12 RX ORDER — PROPOFOL 10 MG/ML
INJECTION, EMULSION INTRAVENOUS PRN
Status: DISCONTINUED | OUTPATIENT
Start: 2018-10-12 | End: 2018-10-12 | Stop reason: SDUPTHER

## 2018-10-12 RX ORDER — ONDANSETRON 2 MG/ML
4 INJECTION INTRAMUSCULAR; INTRAVENOUS PRN
Status: DISCONTINUED | OUTPATIENT
Start: 2018-10-12 | End: 2018-10-12 | Stop reason: HOSPADM

## 2018-10-12 RX ORDER — LIDOCAINE HYDROCHLORIDE 10 MG/ML
1 INJECTION, SOLUTION EPIDURAL; INFILTRATION; INTRACAUDAL; PERINEURAL
Status: DISCONTINUED | OUTPATIENT
Start: 2018-10-12 | End: 2018-10-12 | Stop reason: HOSPADM

## 2018-10-12 RX ORDER — SODIUM CHLORIDE 9 MG/ML
INJECTION, SOLUTION INTRAVENOUS CONTINUOUS
Status: DISCONTINUED | OUTPATIENT
Start: 2018-10-12 | End: 2018-10-12 | Stop reason: HOSPADM

## 2018-10-12 RX ORDER — SODIUM CHLORIDE 9 MG/ML
INJECTION, SOLUTION INTRAVENOUS CONTINUOUS PRN
Status: DISCONTINUED | OUTPATIENT
Start: 2018-10-12 | End: 2018-10-12 | Stop reason: SDUPTHER

## 2018-10-12 RX ORDER — LIDOCAINE HYDROCHLORIDE 20 MG/ML
INJECTION, SOLUTION INFILTRATION; PERINEURAL PRN
Status: DISCONTINUED | OUTPATIENT
Start: 2018-10-12 | End: 2018-10-12 | Stop reason: SDUPTHER

## 2018-10-12 RX ADMIN — SODIUM CHLORIDE: 9 INJECTION, SOLUTION INTRAVENOUS at 10:31

## 2018-10-12 RX ADMIN — SODIUM CHLORIDE: 9 INJECTION, SOLUTION INTRAVENOUS at 10:20

## 2018-10-12 RX ADMIN — PROPOFOL 200 MG: 10 INJECTION, EMULSION INTRAVENOUS at 10:40

## 2018-10-12 RX ADMIN — LIDOCAINE HYDROCHLORIDE 40 MG: 20 INJECTION, SOLUTION INFILTRATION; PERINEURAL at 10:31

## 2018-10-12 ASSESSMENT — PAIN SCALES - GENERAL
PAINLEVEL_OUTOF10: 0

## 2018-10-12 ASSESSMENT — PAIN - FUNCTIONAL ASSESSMENT: PAIN_FUNCTIONAL_ASSESSMENT: 0-10

## 2018-10-12 NOTE — ANESTHESIA POSTPROCEDURE EVALUATION
Department of Anesthesiology  Postprocedure Note    Patient: Tamy Currie  MRN: [id number]  YOB: 1960  Date of evaluation: 10/12/2018  Time:  11:34 AM     Procedure Summary     Date:  10/12/18 Room / Location:  [facility] ENDO 02 / [facility] ENDOSCOPY    Anesthesia Start:  1031 Anesthesia Stop:  0846    Procedure:  EGD BIOPSY (N/A ) Diagnosis:  (NAUSEA AND VOMITING R11.2)    Surgeon:  Ginny Dailey MD Responsible Provider:  Jesusita Evans DO    Anesthesia Type:  MAC ASA Status:  2          Anesthesia Type: MAC    Washington Phase I: Washington Score: 10    Washington Phase II: Washington Score: 9    Last vitals: Reviewed and per EMR flowsheets.        Anesthesia Post Evaluation    Patient location during evaluation: PACU  Patient participation: complete - patient participated  Level of consciousness: agitated  Pain score: 0  Airway patency: patent  Nausea & Vomiting: no nausea and no vomiting  Complications: no  Cardiovascular status: blood pressure returned to baseline  Respiratory status: acceptable  Hydration status: euvolemic

## 2018-10-18 DIAGNOSIS — I10 ESSENTIAL HYPERTENSION: ICD-10-CM

## 2018-10-18 RX ORDER — HYDROCHLOROTHIAZIDE 25 MG/1
25 TABLET ORAL DAILY
Qty: 90 TABLET | Refills: 1 | Status: SHIPPED | OUTPATIENT
Start: 2018-10-18 | End: 2019-03-20 | Stop reason: SDUPTHER

## 2019-03-14 ENCOUNTER — TELEPHONE (OUTPATIENT)
Dept: FAMILY MEDICINE CLINIC | Age: 59
End: 2019-03-14

## 2019-03-20 ENCOUNTER — OFFICE VISIT (OUTPATIENT)
Dept: FAMILY MEDICINE CLINIC | Age: 59
End: 2019-03-20
Payer: COMMERCIAL

## 2019-03-20 VITALS
DIASTOLIC BLOOD PRESSURE: 64 MMHG | HEIGHT: 69 IN | WEIGHT: 189 LBS | OXYGEN SATURATION: 97 % | SYSTOLIC BLOOD PRESSURE: 126 MMHG | BODY MASS INDEX: 27.99 KG/M2 | HEART RATE: 82 BPM

## 2019-03-20 DIAGNOSIS — I10 ESSENTIAL HYPERTENSION: ICD-10-CM

## 2019-03-20 DIAGNOSIS — E11.42 TYPE 2 DIABETES MELLITUS WITH DIABETIC POLYNEUROPATHY, WITHOUT LONG-TERM CURRENT USE OF INSULIN (HCC): ICD-10-CM

## 2019-03-20 DIAGNOSIS — N40.1 BENIGN PROSTATIC HYPERPLASIA WITH NOCTURIA: ICD-10-CM

## 2019-03-20 DIAGNOSIS — R35.1 NOCTURIA: Primary | ICD-10-CM

## 2019-03-20 DIAGNOSIS — R35.1 BENIGN PROSTATIC HYPERPLASIA WITH NOCTURIA: ICD-10-CM

## 2019-03-20 LAB
CREATININE URINE POCT: 200
HBA1C MFR BLD: 6.7 %
MICROALBUMIN/CREAT 24H UR: 30 MG/G{CREAT}
MICROALBUMIN/CREAT UR-RTO: <30

## 2019-03-20 PROCEDURE — 83036 HEMOGLOBIN GLYCOSYLATED A1C: CPT | Performed by: FAMILY MEDICINE

## 2019-03-20 PROCEDURE — 82044 UR ALBUMIN SEMIQUANTITATIVE: CPT | Performed by: FAMILY MEDICINE

## 2019-03-20 PROCEDURE — 99214 OFFICE O/P EST MOD 30 MIN: CPT | Performed by: FAMILY MEDICINE

## 2019-03-20 RX ORDER — TAMSULOSIN HYDROCHLORIDE 0.4 MG/1
0.4 CAPSULE ORAL DAILY
Qty: 90 CAPSULE | Refills: 1 | Status: SHIPPED | OUTPATIENT
Start: 2019-03-20 | End: 2019-04-01 | Stop reason: SDUPTHER

## 2019-03-20 RX ORDER — LISINOPRIL 40 MG/1
40 TABLET ORAL DAILY
Qty: 90 TABLET | Refills: 1 | Status: SHIPPED | OUTPATIENT
Start: 2019-03-20 | End: 2019-05-06 | Stop reason: SDUPTHER

## 2019-03-20 RX ORDER — HYDROCHLOROTHIAZIDE 25 MG/1
25 TABLET ORAL DAILY
Qty: 90 TABLET | Refills: 1 | Status: SHIPPED | OUTPATIENT
Start: 2019-03-20 | End: 2019-05-06

## 2019-03-20 ASSESSMENT — PATIENT HEALTH QUESTIONNAIRE - PHQ9
SUM OF ALL RESPONSES TO PHQ QUESTIONS 1-9: 0
SUM OF ALL RESPONSES TO PHQ QUESTIONS 1-9: 0
2. FEELING DOWN, DEPRESSED OR HOPELESS: 0
SUM OF ALL RESPONSES TO PHQ9 QUESTIONS 1 & 2: 0
1. LITTLE INTEREST OR PLEASURE IN DOING THINGS: 0

## 2019-04-01 ENCOUNTER — OFFICE VISIT (OUTPATIENT)
Dept: FAMILY MEDICINE CLINIC | Age: 59
End: 2019-04-01
Payer: COMMERCIAL

## 2019-04-01 VITALS
BODY MASS INDEX: 28.05 KG/M2 | DIASTOLIC BLOOD PRESSURE: 86 MMHG | HEART RATE: 75 BPM | WEIGHT: 190 LBS | SYSTOLIC BLOOD PRESSURE: 132 MMHG | OXYGEN SATURATION: 95 % | RESPIRATION RATE: 16 BRPM

## 2019-04-01 DIAGNOSIS — R35.1 NOCTURIA: Primary | ICD-10-CM

## 2019-04-01 LAB
BILIRUBIN, POC: NORMAL
BLOOD URINE, POC: NORMAL
CLARITY, POC: CLEAR
COLOR, POC: YELLOW
GLUCOSE URINE, POC: NORMAL
KETONES, POC: NORMAL
LEUKOCYTE EST, POC: NORMAL
NITRITE, POC: NORMAL
PH, POC: 5.5
PROTEIN, POC: NORMAL
SPECIFIC GRAVITY, POC: 1.02
UROBILINOGEN, POC: 0.2

## 2019-04-01 PROCEDURE — 81002 URINALYSIS NONAUTO W/O SCOPE: CPT | Performed by: FAMILY MEDICINE

## 2019-04-01 PROCEDURE — 99213 OFFICE O/P EST LOW 20 MIN: CPT | Performed by: FAMILY MEDICINE

## 2019-04-01 RX ORDER — TAMSULOSIN HYDROCHLORIDE 0.4 MG/1
0.8 CAPSULE ORAL DAILY
Qty: 60 CAPSULE | Refills: 2 | Status: SHIPPED | OUTPATIENT
Start: 2019-04-01 | End: 2019-05-06

## 2019-04-01 NOTE — PROGRESS NOTES
Maureen Alegre is a 62 y.o. male. HPI:  Was seen 2 weeks ago for nocturia. Has been going on for the past few months. Was given rx for flomax, has not helped at all. Has been compliant with medication. Has had some discomfort with urination and some R lower back pain over the past few months but nothing significant. ROS:  Gen:  Denies fever, chills, headaches. HEENT:  Denies cold symptoms, sore throat. CV:  Denies chest pain or tightness, palpitations. Pulm:  Denies shortness of breath, cough. Abd:  Denies abdominal pain, change in bowel habits. I have reviewed the patient's medical/surgical/family/social in detail and updated the computerized patient record as appropriate. Current Outpatient Medications   Medication Sig Dispense Refill    lisinopril (PRINIVIL;ZESTRIL) 40 MG tablet Take 1 tablet by mouth daily 90 tablet 1    hydrochlorothiazide (HYDRODIURIL) 25 MG tablet Take 1 tablet by mouth daily 90 tablet 1    metFORMIN (GLUCOPHAGE) 1000 MG tablet Take 1 tablet by mouth 2 times daily (with meals) 180 tablet 1    tamsulosin (FLOMAX) 0.4 MG capsule Take 1 capsule by mouth daily 90 capsule 1    aspirin 81 MG tablet Take 81 mg by mouth daily      traMADol (ULTRAM) 50 MG tablet Take 50 mg by mouth every 6 hours as needed. No current facility-administered medications for this visit.         Past Medical History:   Diagnosis Date    AAA (abdominal aortic aneurysm) ([address] 11/28/2015    4.2 cm; 2018: 4.5 x 4.2 cm    Aneurysm of descending thoracic aorta ([address] 03/28/2018    2018: 3 cm    Arthritis     Atherosclerosis of aorta ([address] 03/28/2018    extensive per CT 2018    Back pain     Diabetes mellitus ([address]     Diverticulosis     Elevated PSA 10/19/2017    Hyperlipidemia     Hypertension     Polyneuropathy associated with underlying disease ([address] 10/19/2017     Past Surgical History:   Procedure Laterality Date    CARDIAC CATHETERIZATION      Patient staes normal    UPPER GASTROINTESTINAL ENDOSCOPY  11/30/15    with gastric biopsy    UPPER GASTROINTESTINAL ENDOSCOPY N/A 10/12/2018    EGD BIOPSY performed by Kerri Moyer MD at [address]  2017, 2017    Dr. Feroz Tsai     No family history on file.   Social History     Socioeconomic History    Marital status:      Spouse name: Not on file    Number of children: Not on file    Years of education: Not on file    Highest education level: Not on file   Occupational History    Not on file   Social Needs    Financial resource strain: Not on file    Food insecurity:     Worry: Not on file     Inability: Not on file    Transportation needs:     Medical: Not on file     Non-medical: Not on file   Tobacco Use    Smoking status: Former Smoker     Packs/day: 1.00     Years: 20.00     Pack years: 20.00     Types: Cigarettes     Last attempt to quit: 2009     Years since quittin.3    Smokeless tobacco: Never Used   Substance and Sexual Activity    Alcohol use: No    Drug use: No    Sexual activity: Not on file   Lifestyle    Physical activity:     Days per week: Not on file     Minutes per session: Not on file    Stress: Not on file   Relationships    Social connections:     Talks on phone: Not on file     Gets together: Not on file     Attends [employer] service: Not on file     Active member of club or organization: Not on file     Attends meetings of clubs or organizations: Not on file     Relationship status: Not on file    Intimate partner violence:     Fear of current or ex partner: Not on file     Emotionally abused: Not on file     Physically abused: Not on file     Forced sexual activity: Not on file   Other Topics Concern    Not on file   Social History Narrative    Not on file         OBJECTIVE:  /86 (Site: Left Upper Arm, Position: Sitting, Cuff Size: Small Adult)   Pulse 75   Resp 16   Wt 190 lb (86.2 kg)   SpO2 95%   BMI 28.05 kg/m²   GEN:  ARELIS NAD  HEENT:  NCAT  CV:  Regular rate and rhythm, S1 and S2 normal, no murmurs, clicks, gallops or rubs. No edema. PULM:  Chest is clear, no wheezing or rales. Normal symmetric air entry throughout both lung fields. ABD: soft, non-tender, non-distended. Normal bowel sounds. No organomegaly   : no CVA tenderness  PSYCH: normal mood and affect. Intact judgement and insight  NEURO: A&O x 3    ASSESSMENT/PLAN:  1. Nocturia  Trial flomax 0.8mg nightly  UA unremarkable. Will get urine culture/PSA  - PSA, Prostatic Specific Antigen; Future  - POCT Urinalysis no Micro  - tamsulosin (FLOMAX) 0.4 MG capsule; Take 2 capsules by mouth daily  Dispense: 60 capsule;  Refill: 2    F/u 1m for DM visit 5/18/done

## 2019-04-03 DIAGNOSIS — R35.1 NOCTURIA: ICD-10-CM

## 2019-04-03 LAB — PROSTATE SPECIFIC ANTIGEN: 1.22 NG/ML (ref 0–4)

## 2019-04-29 LAB
ALBUMIN SERPL-MCNC: 4.1 G/DL
ALP BLD-CCNC: 59 U/L
ALT SERPL-CCNC: 10 U/L
ANION GAP SERPL CALCULATED.3IONS-SCNC: NORMAL MMOL/L
AST SERPL-CCNC: 19 U/L
AVERAGE GLUCOSE: NORMAL
BASOPHILS ABSOLUTE: ABNORMAL /ΜL
BASOPHILS RELATIVE PERCENT: ABNORMAL %
BILIRUB SERPL-MCNC: 0.2 MG/DL (ref 0.1–1.4)
BUN BLDV-MCNC: 18 MG/DL
CALCIUM SERPL-MCNC: 9.5 MG/DL
CHLORIDE BLD-SCNC: 102 MMOL/L
CHOLESTEROL, TOTAL: 238 MG/DL
CHOLESTEROL/HDL RATIO: 4.6
CO2: NORMAL MMOL/L
CREAT SERPL-MCNC: 1.01 MG/DL
EOSINOPHILS ABSOLUTE: ABNORMAL /ΜL
EOSINOPHILS RELATIVE PERCENT: ABNORMAL %
GFR CALCULATED: NORMAL
GLUCOSE BLD-MCNC: 115 MG/DL
HBA1C MFR BLD: 6.2 %
HCT VFR BLD CALC: 37.8 % (ref 41–53)
HDLC SERPL-MCNC: 52 MG/DL (ref 35–70)
HEMOGLOBIN: 12 G/DL (ref 13.5–17.5)
LDL CHOLESTEROL CALCULATED: 170 MG/DL (ref 0–160)
LYMPHOCYTES ABSOLUTE: ABNORMAL /ΜL
LYMPHOCYTES RELATIVE PERCENT: ABNORMAL %
MCH RBC QN AUTO: 26.1 PG
MCHC RBC AUTO-ENTMCNC: 31.7 G/DL
MCV RBC AUTO: 82 FL
MONOCYTES ABSOLUTE: ABNORMAL /ΜL
MONOCYTES RELATIVE PERCENT: ABNORMAL %
NEUTROPHILS ABSOLUTE: ABNORMAL /ΜL
NEUTROPHILS RELATIVE PERCENT: ABNORMAL %
PLATELET # BLD: 215 K/ΜL
PMV BLD AUTO: ABNORMAL FL
POTASSIUM SERPL-SCNC: 4.4 MMOL/L
RBC # BLD: 4.59 10^6/ΜL
SODIUM BLD-SCNC: 140 MMOL/L
TOTAL PROTEIN: 6.6
TRIGL SERPL-MCNC: 81 MG/DL
TSH SERPL DL<=0.05 MIU/L-ACNC: 1.36 UIU/ML
URIC ACID: 7.1
VLDLC SERPL CALC-MCNC: 16 MG/DL
WBC # BLD: 6.6 10^3/ML

## 2019-05-06 ENCOUNTER — OFFICE VISIT (OUTPATIENT)
Dept: FAMILY MEDICINE CLINIC | Age: 59
End: 2019-05-06
Payer: COMMERCIAL

## 2019-05-06 VITALS
WEIGHT: 194.2 LBS | DIASTOLIC BLOOD PRESSURE: 88 MMHG | RESPIRATION RATE: 15 BRPM | HEART RATE: 83 BPM | SYSTOLIC BLOOD PRESSURE: 138 MMHG | BODY MASS INDEX: 28.67 KG/M2 | OXYGEN SATURATION: 97 %

## 2019-05-06 DIAGNOSIS — E11.42 TYPE 2 DIABETES MELLITUS WITH DIABETIC POLYNEUROPATHY, WITHOUT LONG-TERM CURRENT USE OF INSULIN (HCC): Primary | ICD-10-CM

## 2019-05-06 DIAGNOSIS — I10 ESSENTIAL HYPERTENSION: ICD-10-CM

## 2019-05-06 DIAGNOSIS — R20.0 NUMBNESS AND TINGLING OF RIGHT LEG: ICD-10-CM

## 2019-05-06 DIAGNOSIS — R20.2 NUMBNESS AND TINGLING OF RIGHT LEG: ICD-10-CM

## 2019-05-06 DIAGNOSIS — E78.2 MIXED HYPERLIPIDEMIA: ICD-10-CM

## 2019-05-06 DIAGNOSIS — R10.9 RIGHT FLANK PAIN: ICD-10-CM

## 2019-05-06 LAB — HBA1C MFR BLD: 6.3 %

## 2019-05-06 PROCEDURE — 83036 HEMOGLOBIN GLYCOSYLATED A1C: CPT | Performed by: FAMILY MEDICINE

## 2019-05-06 PROCEDURE — 99214 OFFICE O/P EST MOD 30 MIN: CPT | Performed by: FAMILY MEDICINE

## 2019-05-06 RX ORDER — LISINOPRIL 40 MG/1
40 TABLET ORAL DAILY
Qty: 90 TABLET | Refills: 1 | Status: SHIPPED | OUTPATIENT
Start: 2019-05-06 | End: 2019-08-12 | Stop reason: SDUPTHER

## 2019-05-06 NOTE — PROGRESS NOTES
Matilde Henriquez is a 62 y.o. male. HPI:  Diabetes Mellitus Type II, Follow-up--   Lab Results   Component Value Date    LABA1C 6.3 05/06/2019      Checks sugars at home:Yes. patient does not test.  Last Eye Exam was over a year ago. Pt is on ACEI or ARB. Pt denies polydipsia, polyuria and visual disturbances. pt does adhere to a low carb diet. HTN--Pt seen here for follow up regarding HTN. BP checks at home:No   Pt denies blurred vision, chest pain, palpitations and peripheral edema. Pt complains of none. Tolerating medications: Yes. Pt does not exercise regularly and does adhere to a low salt diet. Stopped ACEI/HCTZ d/t dizziness recently. Was feeling lightheaded when bending over. Restarted ACEI 2 weeks later when BP was high at work- 190s/100s. Has been taking ACEI only since that time. Hyperlipidemia:    Lab Results   Component Value Date    LDLCALC 121 05/07/2018   . He is not currently on a medication. Pt is  following Lifestyle modification including Low fat/low cholesterol diet, low carbohydrate diet, and does not exercise regularly. flomax has not helped with urinary issues. He has stopped taking it. Had blood work done at work recently. Will have results sent to me. C/o pain in bilateral flank (R>L)  that radiates down R leg. Is on his feet a lot during the day. Does have numbness down to R heel every day in the morning. Improves with movement. Had JINNY in 2013 which helped. He feels his current symptoms are different than back then however.      Current Outpatient Medications   Medication Sig Dispense Refill    tamsulosin (FLOMAX) 0.4 MG capsule Take 2 capsules by mouth daily 60 capsule 2    lisinopril (PRINIVIL;ZESTRIL) 40 MG tablet Take 1 tablet by mouth daily 90 tablet 1    hydrochlorothiazide (HYDRODIURIL) 25 MG tablet Take 1 tablet by mouth daily 90 tablet 1    metFORMIN (GLUCOPHAGE) 1000 MG tablet Take 1 tablet by mouth 2 times daily (with meals) 180 tablet 1    aspirin 81 MG tablet Take 81 mg by mouth daily      traMADol (ULTRAM) 50 MG tablet Take 50 mg by mouth every 6 hours as needed. No current facility-administered medications for this visit. Health Maintenance   Topic Date Due    Diabetic retinal exam  03/14/1968    Shingles Vaccine (1 of 2) 03/14/2008    Lipid screen  05/07/2019    Potassium monitoring  05/07/2019    Creatinine monitoring  05/07/2019    Flu vaccine (Season Ended) 09/01/2019    Diabetic foot exam  03/20/2020    A1C test (Diabetic or Prediabetic)  03/20/2020    Diabetic microalbuminuria test  03/20/2020    DTaP/Tdap/Td vaccine (2 - Td) 07/14/2027    Colon cancer screen colonoscopy  10/12/2027    Pneumococcal 0-64 years Vaccine  Completed    Hepatitis C screen  Discontinued    HIV screen  Discontinued       I have reviewed the patient's medical/surgical/family/social in detail and updated the computerized patient record as appropriate. Past Medical History:   Diagnosis Date    AAA (abdominal aortic aneurysm) ([address] 11/28/2015    4.2 cm; 2018: 4.5 x 4.2 cm    Aneurysm of descending thoracic aorta ([address] 03/28/2018    2018: 3 cm    Arthritis     Atherosclerosis of aorta ([address] 03/28/2018    extensive per CT 2018    Back pain     Diabetes mellitus ([address]     Diverticulosis     Elevated PSA 10/19/2017    Hyperlipidemia     Hypertension     Polyneuropathy associated with underlying disease ([address] 10/19/2017     Past Surgical History:   Procedure Laterality Date    CARDIAC CATHETERIZATION      Patient staes normal    UPPER GASTROINTESTINAL ENDOSCOPY  11/30/15    with gastric biopsy    UPPER GASTROINTESTINAL ENDOSCOPY N/A 10/12/2018    EGD BIOPSY performed by Cinthya Del Castillo MD at [address]  5/2017, 7/2017    Dr. Daniel Montiel     No family history on file.   Social History     Socioeconomic History    Marital status:      Spouse name: Not on file    Number of children: Not on file    Years of Supple without adenopathy. CV:  Regular rate and rhythm, S1 and S2 normal, no murmurs, clicks, gallops or rubs. No edema. PULM:  Chest is clear, no wheezing or rales. Normal symmetric air entry throughout both lung fields. ABD: soft, Non-tender, non-distended, normal bowel sounds. No organomegaly  : R CVA tenderness noted   MSK: no spinal or paraspinal tenderness. Normal R hip ROM without bony tenderness in the hip. ASSESSMENT/PLAN:  1. Type 2 diabetes mellitus with diabetic polyneuropathy, without long-term current use of insulin (HCC)  Well controlled  Continue current meds  - POCT glycosylated hemoglobin (Hb A1C)  - metFORMIN (GLUCOPHAGE) 1000 MG tablet; Take 1 tablet by mouth 2 times daily (with meals)  Dispense: 180 tablet; Refill: 1    2. Essential hypertension  BP stable today. On ACEI only  Will monitor at home, call if elevated  - Blood Pressure Monitoring (SPHYGMOMANOMETER) MISC; Use as directed  Dispense: 1 each; Refill: 0  - lisinopril (PRINIVIL;ZESTRIL) 40 MG tablet; Take 1 tablet by mouth daily  Dispense: 90 tablet; Refill: 1    3. Mixed hyperlipidemia  Will f/u on labs done at work. Will start statin, choice of drug depends on labs    4. Right flank pain  Uncertain etiology  Does have CVA tenderness on exam and MSK exam unrevealing. Recommend he get CTA abdomen/pelvis done as previously ordered to f/u AAA and thoracic aneurysm (which he is due for) to evaluate R kidney as well. 5. Numbness and tingling of right leg  Uncertain etiology  Will get CT as above  consider lumbar spine imaging if unrevealing.

## 2019-05-08 ENCOUNTER — TELEPHONE (OUTPATIENT)
Dept: FAMILY MEDICINE CLINIC | Age: 59
End: 2019-05-08

## 2019-05-08 DIAGNOSIS — E78.2 MIXED HYPERLIPIDEMIA: Primary | ICD-10-CM

## 2019-05-08 RX ORDER — ROSUVASTATIN CALCIUM 20 MG/1
20 TABLET, COATED ORAL NIGHTLY
Qty: 30 TABLET | Refills: 5 | Status: SHIPPED | OUTPATIENT
Start: 2019-05-08 | End: 2019-05-14

## 2019-05-08 NOTE — TELEPHONE ENCOUNTER
Labs reviewed  Please abstract into chart    Chol way too high. Need to start crestor. rx sent.  Please inform pt

## 2019-07-31 ENCOUNTER — TELEPHONE (OUTPATIENT)
Dept: FAMILY MEDICINE CLINIC | Age: 59
End: 2019-07-31

## 2019-07-31 DIAGNOSIS — E11.42 TYPE 2 DIABETES MELLITUS WITH DIABETIC POLYNEUROPATHY, WITHOUT LONG-TERM CURRENT USE OF INSULIN (HCC): Primary | ICD-10-CM

## 2019-08-05 DIAGNOSIS — E11.42 TYPE 2 DIABETES MELLITUS WITH DIABETIC POLYNEUROPATHY, WITHOUT LONG-TERM CURRENT USE OF INSULIN (HCC): ICD-10-CM

## 2019-08-05 LAB
CHOLESTEROL, TOTAL: 200 MG/DL (ref 0–199)
HDLC SERPL-MCNC: 49 MG/DL (ref 40–60)
LDL CHOLESTEROL CALCULATED: 132 MG/DL
TRIGL SERPL-MCNC: 96 MG/DL (ref 0–150)
VLDLC SERPL CALC-MCNC: 19 MG/DL

## 2019-08-06 LAB
ESTIMATED AVERAGE GLUCOSE: 134.1 MG/DL
HBA1C MFR BLD: 6.3 %

## 2019-08-12 ENCOUNTER — OFFICE VISIT (OUTPATIENT)
Dept: FAMILY MEDICINE CLINIC | Age: 59
End: 2019-08-12
Payer: COMMERCIAL

## 2019-08-12 VITALS
DIASTOLIC BLOOD PRESSURE: 98 MMHG | SYSTOLIC BLOOD PRESSURE: 148 MMHG | BODY MASS INDEX: 28.64 KG/M2 | RESPIRATION RATE: 16 BRPM | WEIGHT: 194 LBS | HEART RATE: 87 BPM | OXYGEN SATURATION: 96 %

## 2019-08-12 DIAGNOSIS — Z00.00 PE (PHYSICAL EXAM), ANNUAL: Primary | ICD-10-CM

## 2019-08-12 DIAGNOSIS — E78.5 HYPERLIPIDEMIA, UNSPECIFIED HYPERLIPIDEMIA TYPE: ICD-10-CM

## 2019-08-12 DIAGNOSIS — I71.40 ABDOMINAL AORTIC ANEURYSM (AAA) WITHOUT RUPTURE: ICD-10-CM

## 2019-08-12 DIAGNOSIS — I10 ESSENTIAL HYPERTENSION: ICD-10-CM

## 2019-08-12 DIAGNOSIS — E11.42 TYPE 2 DIABETES MELLITUS WITH DIABETIC POLYNEUROPATHY, WITHOUT LONG-TERM CURRENT USE OF INSULIN (HCC): ICD-10-CM

## 2019-08-12 PROBLEM — R97.20 ELEVATED PSA: Status: RESOLVED | Noted: 2017-10-19 | Resolved: 2019-08-12

## 2019-08-12 LAB
BILIRUBIN, POC: NORMAL
BLOOD URINE, POC: NORMAL
CLARITY, POC: NORMAL
COLOR, POC: YELLOW
GLUCOSE URINE, POC: NORMAL
KETONES, POC: NORMAL
LEUKOCYTE EST, POC: NORMAL
NITRITE, POC: POSITIVE
PH, POC: 5.5
PROTEIN, POC: NORMAL
SPECIFIC GRAVITY, POC: >1.03
UROBILINOGEN, POC: 0.2

## 2019-08-12 PROCEDURE — 81002 URINALYSIS NONAUTO W/O SCOPE: CPT | Performed by: FAMILY MEDICINE

## 2019-08-12 PROCEDURE — 99396 PREV VISIT EST AGE 40-64: CPT | Performed by: FAMILY MEDICINE

## 2019-08-12 PROCEDURE — 99213 OFFICE O/P EST LOW 20 MIN: CPT | Performed by: FAMILY MEDICINE

## 2019-08-12 RX ORDER — LISINOPRIL 40 MG/1
40 TABLET ORAL DAILY
Qty: 90 TABLET | Refills: 3 | Status: SHIPPED | OUTPATIENT
Start: 2019-08-12 | End: 2020-01-06 | Stop reason: SDUPTHER

## 2019-08-12 RX ORDER — ATORVASTATIN CALCIUM 20 MG/1
20 TABLET, FILM COATED ORAL DAILY
Qty: 90 TABLET | Refills: 3 | Status: SHIPPED | OUTPATIENT
Start: 2019-08-12 | End: 2020-01-06 | Stop reason: SDUPTHER

## 2019-08-12 RX ORDER — AMLODIPINE BESYLATE 5 MG/1
5 TABLET ORAL DAILY
Qty: 90 TABLET | Refills: 3 | Status: SHIPPED | OUTPATIENT
Start: 2019-08-12 | End: 2020-01-06 | Stop reason: SDUPTHER

## 2019-08-12 NOTE — PROGRESS NOTES
SUBJECTIVE:   Yana Capellan is a 62 y.o. male presenting for his annual checkup. HPI: BP at home has been running high. Stopped HCTZ in May d/t dizziness. Is not taking statin. Did not have f/u CT for AAA done this year as instructed.      Patient Active Problem List   Diagnosis    AAA (abdominal aortic aneurysm) (HCC)    Type 2 diabetes mellitus with diabetic polyneuropathy, without long-term current use of insulin ([address]    Hypertension    Hyperlipidemia    Former smoker    Medical non-compliance    Diverticulosis    Elevated PSA    Polyneuropathy associated with underlying disease ([address]    Aneurysm of descending thoracic aorta ([address]    Atherosclerosis of aorta ([address]       Past Medical History:   Diagnosis Date    AAA (abdominal aortic aneurysm) ([address] 11/28/2015    4.2 cm; 2018: 4.5 x 4.2 cm    Aneurysm of descending thoracic aorta ([address] 03/28/2018    2018: 3 cm    Arthritis     Atherosclerosis of aorta ([address] 03/28/2018    extensive per CT 2018    Back pain     Diabetes mellitus ([address]     Diverticulosis     Elevated PSA 10/19/2017    Hyperlipidemia     Hypertension     Polyneuropathy associated with underlying disease ([address] 10/19/2017       Past Surgical History:   Procedure Laterality Date    CARDIAC CATHETERIZATION      Patient staes normal    UPPER GASTROINTESTINAL ENDOSCOPY  11/30/15    with gastric biopsy    UPPER GASTROINTESTINAL ENDOSCOPY N/A 10/12/2018    EGD BIOPSY performed by Nazia Webster MD at [address] [facility] Antunez  5/2017, 7/2017    Dr. Serafin Zaidi History     Socioeconomic History    Marital status:      Spouse name: None    Number of children: None    Years of education: None    Highest education level: None   Occupational History    None   Social Needs    Financial resource strain: None    Food insecurity:     Worry: None     Inability: None    Transportation needs:     Medical: None     Non-medical: None   Tobacco Use  Smoking status: Former Smoker     Packs/day: 1.00     Years: 20.00     Pack years: 20.00     Types: Cigarettes     Last attempt to quit: 2009     Years since quittin.7    Smokeless tobacco: Never Used   Substance and Sexual Activity    Alcohol use: No    Drug use: No    Sexual activity: None   Lifestyle    Physical activity:     Days per week: None     Minutes per session: None    Stress: None   Relationships    Social connections:     Talks on phone: None     Gets together: None     Attends [employer] service: None     Active member of club or organization: None     Attends meetings of clubs or organizations: None     Relationship status: None    Intimate partner violence:     Fear of current or ex partner: None     Emotionally abused: None     Physically abused: None     Forced sexual activity: None   Other Topics Concern    None   Social History Narrative    None       No family history on file. Current Outpatient Medications   Medication Sig Dispense Refill    Blood Pressure Monitoring (SPHYGMOMANOMETER) MISC Use as directed 1 each 0    aspirin 81 MG tablet Take 81 mg by mouth daily      traMADol (ULTRAM) 50 MG tablet Take 50 mg by mouth every 6 hours as needed.  amLODIPine (NORVASC) 5 MG tablet Take 1 tablet by mouth daily 90 tablet 3    atorvastatin (LIPITOR) 20 MG tablet Take 1 tablet by mouth daily 90 tablet 3    lisinopril (PRINIVIL;ZESTRIL) 40 MG tablet Take 1 tablet by mouth daily 90 tablet 3    metFORMIN (GLUCOPHAGE) 1000 MG tablet Take 1 tablet by mouth 2 times daily (with meals) 180 tablet 3     No current facility-administered medications for this visit. Allergies: Patient has no known allergies.      Health Maintenance   Topic Date Due    Diabetic retinal exam  1970    Shingles Vaccine (1 of 2) 2020 (Originally 3/14/2008)    Flu vaccine (1) 2019    Diabetic foot exam  2020    Diabetic microalbuminuria test  2020   

## 2019-09-13 DIAGNOSIS — I71.40 AAA (ABDOMINAL AORTIC ANEURYSM) WITHOUT RUPTURE: Primary | ICD-10-CM

## 2019-09-16 ENCOUNTER — HOSPITAL ENCOUNTER (OUTPATIENT)
Dept: CT IMAGING | Age: 59
Discharge: HOME | End: 2019-09-16
Payer: COMMERCIAL

## 2019-09-16 DIAGNOSIS — I71.40 AAA (ABDOMINAL AORTIC ANEURYSM) WITHOUT RUPTURE: ICD-10-CM

## 2019-09-16 LAB
ANION GAP SERPL CALCULATED.3IONS-SCNC: 11 MMOL/L (ref 3–16)
BUN BLDV-MCNC: 29 MG/DL (ref 7–20)
CALCIUM SERPL-MCNC: 9.7 MG/DL (ref 8.3–10.6)
CHLORIDE BLD-SCNC: 102 MMOL/L (ref 99–110)
CO2: 27 MMOL/L (ref 21–32)
CREAT SERPL-MCNC: 1.3 MG/DL (ref 0.8–1.3)
GFR AFRICAN AMERICAN: >60
GFR NON-AFRICAN AMERICAN: 56
GLUCOSE BLD-MCNC: 163 MG/DL (ref 70–99)
POTASSIUM SERPL-SCNC: 4.6 MMOL/L (ref 3.5–5.1)
SODIUM BLD-SCNC: 140 MMOL/L (ref 136–145)

## 2019-09-16 PROCEDURE — 74174 CTA ABD&PLVS W/CONTRAST: CPT

## 2019-09-16 PROCEDURE — 80048 BASIC METABOLIC PNL TOTAL CA: CPT

## 2019-09-16 PROCEDURE — 6360000004 HC RX CONTRAST MEDICATION: Performed by: SURGERY

## 2019-09-16 PROCEDURE — 36415 COLL VENOUS BLD VENIPUNCTURE: CPT

## 2019-09-16 RX ADMIN — IOPAMIDOL 75 ML: 755 INJECTION, SOLUTION INTRAVENOUS at 17:11

## 2019-10-01 ENCOUNTER — TELEPHONE (OUTPATIENT)
Dept: VASCULAR SURGERY | Age: 59
End: 2019-10-01

## 2019-10-01 ENCOUNTER — OFFICE VISIT (OUTPATIENT)
Dept: VASCULAR SURGERY | Age: 59
End: 2019-10-01
Payer: COMMERCIAL

## 2019-10-01 VITALS
SYSTOLIC BLOOD PRESSURE: 132 MMHG | WEIGHT: 191 LBS | BODY MASS INDEX: 28.29 KG/M2 | DIASTOLIC BLOOD PRESSURE: 76 MMHG | HEIGHT: 69 IN

## 2019-10-01 DIAGNOSIS — I71.40 AAA (ABDOMINAL AORTIC ANEURYSM) WITHOUT RUPTURE: Primary | ICD-10-CM

## 2019-10-01 PROCEDURE — 99213 OFFICE O/P EST LOW 20 MIN: CPT | Performed by: SURGERY

## 2020-01-06 ENCOUNTER — OFFICE VISIT (OUTPATIENT)
Dept: FAMILY MEDICINE CLINIC | Age: 60
End: 2020-01-06
Payer: COMMERCIAL

## 2020-01-06 VITALS
OXYGEN SATURATION: 97 % | HEART RATE: 91 BPM | DIASTOLIC BLOOD PRESSURE: 72 MMHG | WEIGHT: 195.6 LBS | BODY MASS INDEX: 28.89 KG/M2 | SYSTOLIC BLOOD PRESSURE: 122 MMHG

## 2020-01-06 LAB
BILIRUBIN, POC: NORMAL
BLOOD URINE, POC: NORMAL
CLARITY, POC: NORMAL
COLOR, POC: NORMAL
CREATININE URINE POCT: 300
GLUCOSE URINE, POC: NORMAL
HBA1C MFR BLD: 6.5 %
KETONES, POC: NORMAL
LEUKOCYTE EST, POC: NORMAL
MICROALBUMIN/CREAT 24H UR: 80 MG/G{CREAT}
MICROALBUMIN/CREAT UR-RTO: 30
NITRITE, POC: NORMAL
PH, POC: 5.5
PROTEIN, POC: 30
SPECIFIC GRAVITY, POC: 1.03
UROBILINOGEN, POC: 0.2

## 2020-01-06 PROCEDURE — 82044 UR ALBUMIN SEMIQUANTITATIVE: CPT | Performed by: FAMILY MEDICINE

## 2020-01-06 PROCEDURE — 83036 HEMOGLOBIN GLYCOSYLATED A1C: CPT | Performed by: FAMILY MEDICINE

## 2020-01-06 PROCEDURE — 99214 OFFICE O/P EST MOD 30 MIN: CPT | Performed by: FAMILY MEDICINE

## 2020-01-06 PROCEDURE — 81002 URINALYSIS NONAUTO W/O SCOPE: CPT | Performed by: FAMILY MEDICINE

## 2020-01-06 RX ORDER — AMLODIPINE BESYLATE 5 MG/1
5 TABLET ORAL DAILY
Qty: 90 TABLET | Refills: 3 | Status: SHIPPED | OUTPATIENT
Start: 2020-01-06 | End: 2020-08-19

## 2020-01-06 RX ORDER — LISINOPRIL 40 MG/1
40 TABLET ORAL DAILY
Qty: 90 TABLET | Refills: 3 | Status: SHIPPED | OUTPATIENT
Start: 2020-01-06 | End: 2020-08-20 | Stop reason: SDUPTHER

## 2020-01-06 RX ORDER — ATORVASTATIN CALCIUM 20 MG/1
20 TABLET, FILM COATED ORAL DAILY
Qty: 90 TABLET | Refills: 3 | Status: SHIPPED | OUTPATIENT
Start: 2020-01-06 | End: 2020-08-19

## 2020-01-06 ASSESSMENT — PATIENT HEALTH QUESTIONNAIRE - PHQ9
SUM OF ALL RESPONSES TO PHQ9 QUESTIONS 1 & 2: 0
2. FEELING DOWN, DEPRESSED OR HOPELESS: 0
SUM OF ALL RESPONSES TO PHQ QUESTIONS 1-9: 0
1. LITTLE INTEREST OR PLEASURE IN DOING THINGS: 0
SUM OF ALL RESPONSES TO PHQ QUESTIONS 1-9: 0

## 2020-01-06 NOTE — PROGRESS NOTES
Holley Becerra is a 62 y.o. male. HPI:  Diabetes Mellitus Type II, Follow-up--   Lab Results   Component Value Date    LABA1C 6.5 01/06/2020      Checks sugars at home:No. patient does not test.  Last Eye Exam was in past year. Pt is on ACEI or ARB. Pt denies polydipsia, polyuria and visual disturbances. Does have intermittent burning in feet but is not very bothersome. pt does adhere to a low carb diet. HTN--Pt seen here for follow up regarding HTN. BP checks at home:No   Pt denies blurred vision, chest pain, palpitations and peripheral edema. Pt complains of none. Tolerating medications: Yes. Pt does not exercise regularly and does adhere to a low salt diet. Hyperlipidemia:    Lab Results   Component Value Date    LDLCALC 132 (H) 08/05/2019   . He does not have myalgias from medication. Pt is  following Lifestyle modification including Low fat/low cholesterol diet, low carbohydrate diet, and does not exercise regularly. c/o flank pain and burning with urination when misses metformin dose for many months. No fever, chills. Current Outpatient Medications   Medication Sig Dispense Refill    amLODIPine (NORVASC) 5 MG tablet Take 1 tablet by mouth daily 90 tablet 3    atorvastatin (LIPITOR) 20 MG tablet Take 1 tablet by mouth daily 90 tablet 3    lisinopril (PRINIVIL;ZESTRIL) 40 MG tablet Take 1 tablet by mouth daily 90 tablet 3    metFORMIN (GLUCOPHAGE) 1000 MG tablet Take 1 tablet by mouth 2 times daily (with meals) 180 tablet 3    Blood Pressure Monitoring (SPHYGMOMANOMETER) MISC Use as directed 1 each 0    aspirin 81 MG tablet Take 81 mg by mouth daily      traMADol (ULTRAM) 50 MG tablet Take 50 mg by mouth every 6 hours as needed. No current facility-administered medications for this visit.         Health Maintenance   Topic Date Due    Diabetic retinal exam  03/14/1970    Flu vaccine (1) 09/01/2019    Shingles Vaccine (1 of 2) 08/12/2020 (Originally 3/14/2008)    Diabetic foot Packs/day: 1.00     Years: 20.00     Pack years: 20.00     Types: Cigarettes     Last attempt to quit: 11/29/2009     Years since quitting: 10.1    Smokeless tobacco: Never Used   Substance and Sexual Activity    Alcohol use: No    Drug use: No    Sexual activity: Not on file   Lifestyle    Physical activity:     Days per week: Not on file     Minutes per session: Not on file    Stress: Not on file   Relationships    Social connections:     Talks on phone: Not on file     Gets together: Not on file     Attends Sabianism service: Not on file     Active member of club or organization: Not on file     Attends meetings of clubs or organizations: Not on file     Relationship status: Not on file    Intimate partner violence:     Fear of current or ex partner: Not on file     Emotionally abused: Not on file     Physically abused: Not on file     Forced sexual activity: Not on file   Other Topics Concern    Not on file   Social History Narrative    Not on file         ROS:  Gen:  Denies fever, chills, headaches. HEENT:  Denies cold symptoms, sore throat. CV:  Denies chest pain or tightness, palpitations. Pulm:  Denies shortness of breath, cough. Abd:  Denies abdominal pain, change in bowel habits. I have reviewed the patient's medical history in detail and updated the computerized patient record as appropriate. OBJECTIVE:  /72   Pulse 91   Wt 195 lb 9.6 oz (88.7 kg)   SpO2 97%   BMI 28.89 kg/m²   GEN:  WDWN, NAD  HEENT:  NCAT, TM/OP nl, PERRL, EOMI. NECK:  Supple without adenopathy. CV:  Regular rate and rhythm, S1 and S2 normal, no murmurs, clicks, gallops or rubs. No edema. PULM:  Chest is clear, no wheezing or rales. Normal symmetric air entry throughout both lung fields. ABD: soft, Non-tender, non-distended, normal bowel sounds. No organomegaly     ASSESSMENT/PLAN:  1.  Type 2 diabetes mellitus with diabetic polyneuropathy, without long-term current use of insulin (HCC)  Stable on

## 2020-01-08 LAB — URINE CULTURE, ROUTINE: NORMAL

## 2020-02-28 NOTE — TELEPHONE ENCOUNTER
Medication and Quantity requested: tamsulosin (FLOMAX) 0.4 MG capsule          Last Visit  1/6/20    Pharmacy and phone number updated in [facility]:  Yes  Odalis Rayo

## 2020-03-02 RX ORDER — TAMSULOSIN HYDROCHLORIDE 0.4 MG/1
CAPSULE ORAL
Qty: 60 CAPSULE | Refills: 5 | Status: SHIPPED | OUTPATIENT
Start: 2020-03-02 | End: 2021-02-23

## 2020-03-02 NOTE — TELEPHONE ENCOUNTER
Medication:   Requested Prescriptions     Pending Prescriptions Disp Refills    tamsulosin (FLOMAX) 0.4 MG capsule [Pharmacy Med Name: TAMSULOSIN HCL 0.4 MG CAPSULE] 60 capsule 1     Sig: TAKE TWO CAPSULES BY MOUTH DAILY        Last Filled:  4/1/2019 #60 w/2 refills     Patient Phone Number: [phone] (home) [phone] (work)    Last appt: 1/6/2020    Return in about 6 months (around 7/6/2020) for f/u DM 30 min. Next appt: Visit date not found    Last OARRS: No flowsheet data found.

## 2020-08-12 ENCOUNTER — OFFICE VISIT (OUTPATIENT)
Dept: FAMILY MEDICINE CLINIC | Age: 60
End: 2020-08-12
Payer: COMMERCIAL

## 2020-08-12 VITALS
TEMPERATURE: 97.6 F | WEIGHT: 195 LBS | HEIGHT: 69 IN | BODY MASS INDEX: 28.88 KG/M2 | OXYGEN SATURATION: 100 % | DIASTOLIC BLOOD PRESSURE: 78 MMHG | SYSTOLIC BLOOD PRESSURE: 138 MMHG | HEART RATE: 81 BPM

## 2020-08-12 LAB
BILIRUBIN, POC: NORMAL
BLOOD URINE, POC: NORMAL
CLARITY, POC: CLEAR
COLOR, POC: YELLOW
CREATININE URINE POCT: 100
GLUCOSE URINE, POC: NORMAL
HBA1C MFR BLD: 6.7 %
KETONES, POC: NORMAL
LEUKOCYTE EST, POC: NORMAL
MICROALBUMIN/CREAT 24H UR: 30 MG/G{CREAT}
MICROALBUMIN/CREAT UR-RTO: <30
NITRITE, POC: NORMAL
PH, POC: 5.5
PROTEIN, POC: NORMAL
SPECIFIC GRAVITY, POC: 1.02
UROBILINOGEN, POC: 0.2

## 2020-08-12 PROCEDURE — 99213 OFFICE O/P EST LOW 20 MIN: CPT | Performed by: FAMILY MEDICINE

## 2020-08-12 PROCEDURE — 82044 UR ALBUMIN SEMIQUANTITATIVE: CPT | Performed by: FAMILY MEDICINE

## 2020-08-12 PROCEDURE — 99396 PREV VISIT EST AGE 40-64: CPT | Performed by: FAMILY MEDICINE

## 2020-08-12 PROCEDURE — 81002 URINALYSIS NONAUTO W/O SCOPE: CPT | Performed by: FAMILY MEDICINE

## 2020-08-12 PROCEDURE — 83036 HEMOGLOBIN GLYCOSYLATED A1C: CPT | Performed by: FAMILY MEDICINE

## 2020-08-12 RX ORDER — BLOOD-GLUCOSE METER
1 KIT MISCELLANEOUS ONCE
Qty: 1 KIT | Refills: 0 | Status: ON HOLD | OUTPATIENT
Start: 2020-08-12 | End: 2021-06-02 | Stop reason: HOSPADM

## 2020-08-12 RX ORDER — CIPROFLOXACIN 500 MG/1
500 TABLET, FILM COATED ORAL 2 TIMES DAILY
Qty: 14 TABLET | Refills: 0 | Status: SHIPPED | OUTPATIENT
Start: 2020-08-12 | End: 2020-08-19

## 2020-08-12 NOTE — PROGRESS NOTES
SUBJECTIVE:   Kareem Hidalgo is a 56 y.o. male presenting for his annual checkup. HPI: not checking sugars at home. Not checking BP at home. C/o burning and penile pain w urination for almost a year. Requesting antibiotics today, states it has helped in the past. Denies new sexual partners, is unable to get erection since had urethral dilation in 2017 with Dr. Hunter Amaro. Feels he was doing fine before this, then had dilation done twice and has had pain w urination and frequent nocturia since that time. Agreeable to see urologist again, but will want referral to someone else.      Patient Active Problem List   Diagnosis    AAA (abdominal aortic aneurysm) (HCC)    Type 2 diabetes mellitus with diabetic polyneuropathy, without long-term current use of insulin ([address]    Hypertension    Hyperlipidemia    Former smoker    Medical non-compliance    Diverticulosis    Polyneuropathy associated with underlying disease ([address]    Aneurysm of descending thoracic aorta ([address]    Atherosclerosis of aorta ([address]       Past Medical History:   Diagnosis Date    AAA (abdominal aortic aneurysm) ([address] 11/28/2015    4.3 cm 9/2019. yearly CT    Aneurysm of descending thoracic aorta ([address] 03/28/2018    2018: 3 cm    Arthritis     Atherosclerosis of aorta ([address] 03/28/2018    extensive per CT 2018    Back pain     Diabetes mellitus ([address]     Diverticulosis     Elevated PSA 10/19/2017    Hyperlipidemia     Hypertension     Polyneuropathy associated with underlying disease ([address] 10/19/2017       Past Surgical History:   Procedure Laterality Date    CARDIAC CATHETERIZATION      Patient staes normal    UPPER GASTROINTESTINAL ENDOSCOPY  11/30/15    with gastric biopsy    UPPER GASTROINTESTINAL ENDOSCOPY N/A 10/12/2018    EGD BIOPSY performed by Akila Johnson MD at [address]  5/2017, 7/2017    Dr. Aleja Wu History     Socioeconomic History    Marital status:  (36.4 °C)   Ht 5' 9\" (1.753 m)   Wt 195 lb (88.5 kg)   SpO2 100%   BMI 28.80 kg/m²   General appearance: healthy, alert, no distress, oriented x3  Skin: Skin color, texture, turgor normal. No rashes or suspicious lesions. No induration or tightening palpated. HEENT: normocephalic, atraumatic. PERRLA, EOMI. Conjunctiva/corneas clear. External ears normal. Canals clear. TM's clear bilaterally. Hearing was grossly normal.  Nares patent, septum midline, no drainage. Lips, mucosa and tongue normal.  Teeth and gums normal.  Oropharynx clear without exudate. Neck: supple. No adenopathy or thyromegaly. No carotid bruits were noted. Lungs: Lungs clear to auscultation bilaterally. No retractions or use of accessory muscles. No wheezes, rhonchi or rales. Heart: S1 and S2 normal, no murmurs, rubs or gallops, regular rate and rhythm. PMI nondisplaced. Abdomen: soft, non-tender, non-distended. Normoactive BS. No guarding, mass or organomegaly. Extremities: No deformities, clubbing, cyanosis or edema, normal peripheral pulses. MSK: MS intact  Neuro: CN II-XII intact bilaterally. DTRs 2+ in all extremities. Normal sensation to light touch. UA:Results for Nicole SZYMANSKI (MRN [id number]) as of 8/12/2020 15:35   Ref. Range 8/12/2020 15:34   Protein, UA Unknown neg   Color, UA Unknown yellow   Clarity, UA Unknown clear   Glucose, UA POC Unknown neg   Bilirubin, UA Unknown neg   Ketones, UA Unknown neg   Spec Grav, UA Unknown 1.020   pH, UA Unknown 5.5   Urobilinogen, UA Unknown 0.2   Nitrite, UA Unknown neg   Leukocytes, UA Unknown small   Blood, UA POC Unknown neg     Results for ALI, ABDO (MRN [id number]) as of 8/12/2020 15:35   Ref. Range 8/12/2020 15:35   Hemoglobin A1C Latest Units: % 6.7       ASSESSMENT/PLAN:  1. Annual physical exam  - CBC Auto Differential; Future  - Comprehensive Metabolic Panel; Future  - Lipid Panel; Future  - PSA, Prostatic Specific Antigen; Future    2.  Type 2 diabetes mellitus with diabetic polyneuropathy, without long-term current use of insulin (HCC)  Control stable. Continue current meds  - HM DIABETES FOOT EXAM  - POCT microalbumin  - POCT glycosylated hemoglobin (Hb A1C)  - glucose monitoring kit (FREESTYLE) monitoring kit; 1 each by Does not apply route once for 1 dose  Dispense: 1 kit; Refill: 0    3. Essential hypertension  stable  - CBC Auto Differential; Future  - Comprehensive Metabolic Panel; Future    4. Hyperlipidemia, unspecified hyperlipidemia type  Check lipids, continue statin  - Lipid Panel; Future    5. Dysuria  UA unremarkable  Trial abx as have given relief in the past  Send culture  Will likely need urology referral if no improvement of symptoms  - POCT Urinalysis no Micro  - Culture, Urine  - ciprofloxacin (CIPRO) 500 MG tablet; Take 1 tablet by mouth 2 times daily for 7 days  Dispense: 14 tablet; Refill: 0        All health maintenance issues were updated.   Recommend begin progressive daily aerobic exercise program, follow a low fat, low cholesterol diet and attempt to lose weight

## 2020-08-13 ENCOUNTER — NURSE ONLY (OUTPATIENT)
Dept: FAMILY MEDICINE CLINIC | Age: 60
End: 2020-08-13
Payer: COMMERCIAL

## 2020-08-13 LAB
HCT VFR BLD CALC: 36.2 % (ref 40.5–52.5)
HEMOGLOBIN: 11.7 G/DL (ref 13.5–17.5)
MCH RBC QN AUTO: 25.4 PG (ref 26–34)
MCHC RBC AUTO-ENTMCNC: 32.3 G/DL (ref 31–36)
MCV RBC AUTO: 78.8 FL (ref 80–100)
PDW BLD-RTO: 16.8 % (ref 12.4–15.4)
PLATELET # BLD: 166 K/UL (ref 135–450)
PMV BLD AUTO: 10.4 FL (ref 5–10.5)
RBC # BLD: 4.59 M/UL (ref 4.2–5.9)
WBC # BLD: 7.1 K/UL (ref 4–11)

## 2020-08-13 PROCEDURE — 36415 COLL VENOUS BLD VENIPUNCTURE: CPT | Performed by: FAMILY MEDICINE

## 2020-08-14 LAB
A/G RATIO: 1.6 (ref 1.1–2.2)
ALBUMIN SERPL-MCNC: 4.1 G/DL (ref 3.4–5)
ALP BLD-CCNC: 68 U/L (ref 40–129)
ALT SERPL-CCNC: 12 U/L (ref 10–40)
ANION GAP SERPL CALCULATED.3IONS-SCNC: 11 MMOL/L (ref 3–16)
AST SERPL-CCNC: 14 U/L (ref 15–37)
BILIRUB SERPL-MCNC: <0.2 MG/DL (ref 0–1)
BUN BLDV-MCNC: 19 MG/DL (ref 7–20)
CALCIUM SERPL-MCNC: 9.5 MG/DL (ref 8.3–10.6)
CHLORIDE BLD-SCNC: 105 MMOL/L (ref 99–110)
CHOLESTEROL, TOTAL: 261 MG/DL (ref 0–199)
CO2: 24 MMOL/L (ref 21–32)
CREAT SERPL-MCNC: 1 MG/DL (ref 0.8–1.3)
GFR AFRICAN AMERICAN: >60
GFR NON-AFRICAN AMERICAN: >60
GLOBULIN: 2.5 G/DL
GLUCOSE BLD-MCNC: 111 MG/DL (ref 70–99)
HDLC SERPL-MCNC: 50 MG/DL (ref 40–60)
LDL CHOLESTEROL CALCULATED: 185 MG/DL
POTASSIUM SERPL-SCNC: 5 MMOL/L (ref 3.5–5.1)
PROSTATE SPECIFIC ANTIGEN: 1.29 NG/ML (ref 0–4)
SODIUM BLD-SCNC: 140 MMOL/L (ref 136–145)
TOTAL PROTEIN: 6.6 G/DL (ref 6.4–8.2)
TRIGL SERPL-MCNC: 130 MG/DL (ref 0–150)
VLDLC SERPL CALC-MCNC: 26 MG/DL

## 2020-08-15 LAB
ORGANISM: ABNORMAL
URINE CULTURE, ROUTINE: ABNORMAL

## 2020-08-18 NOTE — TELEPHONE ENCOUNTER
Reviewed medication list with patient. He will call his pharmacy for the refills as we show he has refills on file.

## 2020-08-19 RX ORDER — AMLODIPINE BESYLATE 5 MG/1
TABLET ORAL
Qty: 30 TABLET | Refills: 2 | Status: SHIPPED | OUTPATIENT
Start: 2020-08-19 | End: 2020-11-20

## 2020-08-19 RX ORDER — ROSUVASTATIN CALCIUM 20 MG/1
20 TABLET, COATED ORAL NIGHTLY
Qty: 30 TABLET | Refills: 3 | Status: SHIPPED | OUTPATIENT
Start: 2020-08-19 | End: 2021-05-17 | Stop reason: SDUPTHER

## 2020-08-19 NOTE — TELEPHONE ENCOUNTER
Medication:   Requested Prescriptions     Pending Prescriptions Disp Refills    amLODIPine (NORVASC) 5 MG tablet [Pharmacy Med Name: amLODIPine BESYLATE 5 MG TAB] 30 tablet 2     Sig: TAKE ONE TABLET BY MOUTH DAILY        Last Filled:  1/6/2020 90 tabs 3 refills     Patient Phone Number: [phone] (home) [phone] (work)    Last appt: 8/12/2020   Next appt: 2/15/2021    Last OARRS: No flowsheet data found.

## 2020-08-20 RX ORDER — LISINOPRIL 40 MG/1
40 TABLET ORAL DAILY
Qty: 90 TABLET | Refills: 3 | Status: ON HOLD
Start: 2020-08-20 | End: 2021-06-02 | Stop reason: HOSPADM

## 2020-11-20 RX ORDER — AMLODIPINE BESYLATE 5 MG/1
TABLET ORAL
Qty: 30 TABLET | Refills: 1 | Status: SHIPPED | OUTPATIENT
Start: 2020-11-20 | End: 2021-01-22

## 2020-11-20 NOTE — TELEPHONE ENCOUNTER
Medication:   Requested Prescriptions     Pending Prescriptions Disp Refills    amLODIPine (NORVASC) 5 MG tablet [Pharmacy Med Name: amLODIPine BESYLATE 5 MG TAB] 30 tablet 1     Sig: TAKE ONE TABLET BY MOUTH DAILY        Last Filled:  8/19/2020 330 w/ 2 refills     Patient Phone Number: [phone] (home) [phone] (work)    Last appt: 9/23/2020   Next appt: 2/15/2021    Last OARRS: No flowsheet data found.

## 2021-01-22 RX ORDER — AMLODIPINE BESYLATE 5 MG/1
TABLET ORAL
Qty: 30 TABLET | Refills: 0 | Status: SHIPPED | OUTPATIENT
Start: 2021-01-22 | End: 2021-02-22

## 2021-01-22 NOTE — TELEPHONE ENCOUNTER
Medication:   Requested Prescriptions     Pending Prescriptions Disp Refills    amLODIPine (NORVASC) 5 MG tablet [Pharmacy Med Name: amLODIPine BESYLATE 5 MG TAB] 30 tablet 0     Sig: TAKE ONE TABLET BY MOUTH DAILY        Last Filled:  11/20/2020 30 tabs 1 refill     Patient Phone Number: [phone] (home) [phone] (work)    Last appt: 9/23/2020   Next appt: 2/15/2021    Last OARRS: No flowsheet data found.

## 2021-02-08 ENCOUNTER — OFFICE VISIT (OUTPATIENT)
Dept: FAMILY MEDICINE CLINIC | Age: 61
End: 2021-02-08
Payer: COMMERCIAL

## 2021-02-08 VITALS
DIASTOLIC BLOOD PRESSURE: 84 MMHG | HEIGHT: 69 IN | OXYGEN SATURATION: 100 % | WEIGHT: 196 LBS | BODY MASS INDEX: 29.03 KG/M2 | HEART RATE: 97 BPM | SYSTOLIC BLOOD PRESSURE: 142 MMHG

## 2021-02-08 DIAGNOSIS — Z12.5 PROSTATE CANCER SCREENING: ICD-10-CM

## 2021-02-08 DIAGNOSIS — R10.9 RIGHT FLANK PAIN: ICD-10-CM

## 2021-02-08 DIAGNOSIS — E11.42 TYPE 2 DIABETES MELLITUS WITH DIABETIC POLYNEUROPATHY, WITHOUT LONG-TERM CURRENT USE OF INSULIN (HCC): Primary | ICD-10-CM

## 2021-02-08 DIAGNOSIS — D64.9 ANEMIA, UNSPECIFIED TYPE: ICD-10-CM

## 2021-02-08 DIAGNOSIS — I10 ESSENTIAL HYPERTENSION: ICD-10-CM

## 2021-02-08 DIAGNOSIS — I71.23 ANEURYSM OF DESCENDING THORACIC AORTA: ICD-10-CM

## 2021-02-08 DIAGNOSIS — M54.42 ACUTE LEFT-SIDED LOW BACK PAIN WITH LEFT-SIDED SCIATICA: ICD-10-CM

## 2021-02-08 DIAGNOSIS — E78.5 HYPERLIPIDEMIA, UNSPECIFIED HYPERLIPIDEMIA TYPE: ICD-10-CM

## 2021-02-08 PROCEDURE — 99215 OFFICE O/P EST HI 40 MIN: CPT | Performed by: FAMILY MEDICINE

## 2021-02-08 PROCEDURE — 81002 URINALYSIS NONAUTO W/O SCOPE: CPT | Performed by: FAMILY MEDICINE

## 2021-02-08 RX ORDER — METHYLPREDNISOLONE 4 MG/1
TABLET ORAL
Qty: 21 TABLET | Refills: 0 | Status: SHIPPED | OUTPATIENT
Start: 2021-02-08 | End: 2021-02-14

## 2021-02-08 ASSESSMENT — PATIENT HEALTH QUESTIONNAIRE - PHQ9
SUM OF ALL RESPONSES TO PHQ QUESTIONS 1-9: 0
1. LITTLE INTEREST OR PLEASURE IN DOING THINGS: 0
2. FEELING DOWN, DEPRESSED OR HOPELESS: 0
SUM OF ALL RESPONSES TO PHQ QUESTIONS 1-9: 0

## 2021-02-08 NOTE — PROGRESS NOTES
 URETHRAL STRICTURE DILATATION  2017, 2017    Dr. Wily Stoner     No family history on file. Social History     Socioeconomic History    Marital status:      Spouse name: Not on file    Number of children: Not on file    Years of education: Not on file    Highest education level: Not on file   Occupational History    Not on file   Social Needs    Financial resource strain: Not on file    Food insecurity     Worry: Not on file     Inability: Not on file    Transportation needs     Medical: Not on file     Non-medical: Not on file   Tobacco Use    Smoking status: Former Smoker     Packs/day: 1.00     Years: 20.00     Pack years: 20.00     Types: Cigarettes     Quit date: 2009     Years since quittin.2    Smokeless tobacco: Never Used   Substance and Sexual Activity    Alcohol use: No    Drug use: No    Sexual activity: Not on file   Lifestyle    Physical activity     Days per week: Not on file     Minutes per session: Not on file    Stress: Not on file   Relationships    Social connections     Talks on phone: Not on file     Gets together: Not on file     Attends Jewish service: Not on file     Active member of club or organization: Not on file     Attends meetings of clubs or organizations: Not on file     Relationship status: Not on file    Intimate partner violence     Fear of current or ex partner: Not on file     Emotionally abused: Not on file     Physically abused: Not on file     Forced sexual activity: Not on file   Other Topics Concern    Not on file   Social History Narrative    Not on file         ROS:  Gen:  Denies fever, chills, headaches. HEENT:  Denies cold symptoms, sore throat. CV:  Denies chest pain or tightness, palpitations. Pulm:  Denies shortness of breath, cough. Abd:  Denies abdominal pain, change in bowel habits. I have reviewed the patient's medical history in detail and updated the computerized patient record as appropriate.      OBJECTIVE: BP (!) 142/84 (Site: Right Upper Arm, Position: Sitting, Cuff Size: Medium Adult)   Pulse 97   Ht 5' 9\" (1.753 m)   Wt 196 lb (88.9 kg)   SpO2 100%   BMI 28.94 kg/m²   GEN:  WDWN, NAD  HEENT:  NCAT, TM/OP nl, PERRL, EOMI. NECK:  Supple without adenopathy. CV:  Regular rate and rhythm, S1 and S2 normal, no murmurs, clicks, gallops or rubs. No edema. PULM:  Chest is clear, no wheezing or rales. Normal symmetric air entry throughout both lung fields. ABD: soft, Non-tender, non-distended, normal bowel sounds. No organomegaly  : no CVA tenderness noted  MSK: no spinal tenderness. ttp over bilateral SI joints w mild tenderness over L piriformis muscle noted. Non tender over anteriolateral L hip. Bilateral hip ROM normal. Neg SLR bilaterally. Normal gait. ASSESSMENT/PLAN:  1. Type 2 diabetes mellitus with diabetic polyneuropathy, without long-term current use of insulin ([facility]  Labs as ordered  Pt urged to monitor at home  - Hemoglobin A1C; Future    2. Essential hypertension  BP slightly high today  Watch BP at home. F/u if remains elevated  - CBC Auto Differential; Future  - Comprehensive Metabolic Panel; Future    3. Hyperlipidemia, unspecified hyperlipidemia type  Continue crestor  Recheck lipids  - Lipid Panel; Future    4. Aneurysm of descending thoracic aorta ([address]  Overdue for annual CT  Scheduling info given to pt    5. Anemia, unspecified type  Check CBC    6. Prostate cancer screening  - PSA, Prostatic Specific Antigen; Future    7. Right flank pain  UA unremarkable   Labs as ordered  Will get imaging if persists   - POCT Urinalysis no Micro    8. Acute left-sided low back pain with left-sided sciatica  - Ambulatory referral to Physical Therapy  - methylPREDNISolone (MEDROL DOSEPACK) 4 MG tablet; Take by mouth. Dispense: 21 tablet;  Refill: 0 Discussed the importance of a low carb diet with regular cardiovascular exercise. Patient was given educational handouts regarding proper low carb/low calorie diet.

## 2021-02-09 LAB — URINE CULTURE, ROUTINE: NORMAL

## 2021-02-22 DIAGNOSIS — R35.1 NOCTURIA: ICD-10-CM

## 2021-02-22 RX ORDER — AMLODIPINE BESYLATE 5 MG/1
TABLET ORAL
Qty: 30 TABLET | Refills: 0 | Status: SHIPPED | OUTPATIENT
Start: 2021-02-22 | End: 2021-03-24

## 2021-02-22 NOTE — TELEPHONE ENCOUNTER
Medication:   Requested Prescriptions     Pending Prescriptions Disp Refills    amLODIPine (NORVASC) 5 MG tablet [Pharmacy Med Name: amLODIPine BESYLATE 5 MG TAB] 30 tablet 0     Sig: TAKE ONE TABLET BY MOUTH DAILY        Last Filled:  1/22/21    Patient Phone Number: [phone] (home) [phone] (work)    Last appt: 2/8/2021   Next appt: 8/9/2021    Last OARRS: No flowsheet data found.

## 2021-02-23 RX ORDER — TAMSULOSIN HYDROCHLORIDE 0.4 MG/1
CAPSULE ORAL
Qty: 60 CAPSULE | Refills: 4 | Status: SHIPPED | OUTPATIENT
Start: 2021-02-23 | End: 2021-10-06

## 2021-02-23 NOTE — TELEPHONE ENCOUNTER
Medication:   Requested Prescriptions     Pending Prescriptions Disp Refills    tamsulosin (FLOMAX) 0.4 MG capsule [Pharmacy Med Name: TAMSULOSIN HCL 0.4 MG CAPSULE] 60 capsule 4     Sig: TAKE TWO CAPSULES BY MOUTH DAILY        Last Filled:  3/2/20 #60 refills 5    Patient Phone Number: [phone] (home) [phone] (work)    Last appt: 2/8/2021   Next appt: 8/9/2021    Last OARRS: No flowsheet data found.

## 2021-03-24 RX ORDER — AMLODIPINE BESYLATE 5 MG/1
TABLET ORAL
Qty: 30 TABLET | Refills: 0 | Status: SHIPPED | OUTPATIENT
Start: 2021-03-24 | End: 2021-04-22

## 2021-03-24 NOTE — TELEPHONE ENCOUNTER
Medication:   Requested Prescriptions     Pending Prescriptions Disp Refills    amLODIPine (NORVASC) 5 MG tablet [Pharmacy Med Name: amLODIPine BESYLATE 5 MG TAB] 30 tablet 0     Sig: TAKE ONE TABLET BY MOUTH DAILY        Last Filled:  2/22/2021 30 tabs 0 refills     Patient Phone Number: [phone] (home) [phone] (work)    Last appt: 2/8/2021   Next appt: 8/9/2021    Last OARRS: No flowsheet data found.

## 2021-04-22 RX ORDER — AMLODIPINE BESYLATE 5 MG/1
TABLET ORAL
Qty: 30 TABLET | Refills: 5 | Status: ON HOLD
Start: 2021-04-22 | End: 2021-06-02 | Stop reason: HOSPADM

## 2021-04-22 NOTE — TELEPHONE ENCOUNTER
Medication:   Requested Prescriptions     Pending Prescriptions Disp Refills    amLODIPine (NORVASC) 5 MG tablet [Pharmacy Med Name: amLODIPine BESYLATE 5 MG TAB] 30 tablet 0     Sig: TAKE ONE TABLET BY MOUTH DAILY        Last Filled:  3/24/2021 30 tabs 0 refills     Patient Phone Number: [phone] (home) [phone] (work)    Last appt: 2/8/2021   Next appt: 8/9/2021    Last OARRS: No flowsheet data found.

## 2021-05-04 ENCOUNTER — NURSE TRIAGE (OUTPATIENT)
Dept: OTHER | Facility: CLINIC | Age: 61
End: 2021-05-04

## 2021-05-04 NOTE — TELEPHONE ENCOUNTER
[facility] [place] with Red Flag Complaint. Brief description of triage: Patient calling to leave a message for his provider about her recommendation to follow up with someone (could not remember who) in relation to chest pain. Patient states that he did not complete this as recommended but wanted to do so now. Discussed with patient that he was sent to the on-call nurse and I could triage his symptoms and offer care advice since the office was closed. Patient refusing triage. States he will call back in the morning to speak with office. Advised patient concerns for chest pain to go to ED for evaluation. Patient refusing at this time and will call back tomorrow to speak with office. On-call provider paged and spoke with Dr Marleni Olmstead to update to patient's status and refusal of triage/ED disposition. Attention Provider: Thank you for allowing me to participate in the care of your patient. The patient was connected to triage in response to information provided to the ECC. Please do not respond through this encounter as the response is not directed to a shared pool. Reason for Disposition   Caller requesting an appointment, triage offered and declined    Answer Assessment - Initial Assessment Questions  1. REASON FOR CALL or QUESTION: \"What is your reason for calling today? \" or \"How can I best  help you? \" or \"What question do you have that I can help answer? \"      Was recommended to follow up with speciality but is unsure what doctor he was suppose to follow up with. States he has middle chest pain complaints. 2. CALLER: Document the source of call. (e.g., laboratory, patient).       patient    Protocols used: PCP CALL - NO TRIAGE-ADULT-

## 2021-05-10 ENCOUNTER — OFFICE VISIT (OUTPATIENT)
Dept: FAMILY MEDICINE CLINIC | Age: 61
End: 2021-05-10
Payer: COMMERCIAL

## 2021-05-10 VITALS
WEIGHT: 194 LBS | BODY MASS INDEX: 28.73 KG/M2 | OXYGEN SATURATION: 99 % | DIASTOLIC BLOOD PRESSURE: 78 MMHG | HEART RATE: 115 BPM | SYSTOLIC BLOOD PRESSURE: 148 MMHG | HEIGHT: 69 IN

## 2021-05-10 DIAGNOSIS — R00.0 TACHYCARDIA: ICD-10-CM

## 2021-05-10 DIAGNOSIS — R07.9 CHEST PAIN, UNSPECIFIED TYPE: ICD-10-CM

## 2021-05-10 DIAGNOSIS — I71.23 ANEURYSM OF DESCENDING THORACIC AORTA: ICD-10-CM

## 2021-05-10 DIAGNOSIS — Z00.00 ANNUAL PHYSICAL EXAM: Primary | ICD-10-CM

## 2021-05-10 DIAGNOSIS — E11.42 TYPE 2 DIABETES MELLITUS WITH DIABETIC POLYNEUROPATHY, WITHOUT LONG-TERM CURRENT USE OF INSULIN (HCC): ICD-10-CM

## 2021-05-10 DIAGNOSIS — E78.5 HYPERLIPIDEMIA, UNSPECIFIED HYPERLIPIDEMIA TYPE: ICD-10-CM

## 2021-05-10 DIAGNOSIS — I10 ESSENTIAL HYPERTENSION: ICD-10-CM

## 2021-05-10 PROCEDURE — 99213 OFFICE O/P EST LOW 20 MIN: CPT | Performed by: FAMILY MEDICINE

## 2021-05-10 PROCEDURE — 99396 PREV VISIT EST AGE 40-64: CPT | Performed by: FAMILY MEDICINE

## 2021-05-10 PROCEDURE — 93000 ELECTROCARDIOGRAM COMPLETE: CPT | Performed by: FAMILY MEDICINE

## 2021-05-10 RX ORDER — METOPROLOL SUCCINATE 25 MG/1
25 TABLET, EXTENDED RELEASE ORAL DAILY
Qty: 30 TABLET | Refills: 5 | Status: ON HOLD
Start: 2021-05-10 | End: 2021-06-02 | Stop reason: HOSPADM

## 2021-05-10 NOTE — PROGRESS NOTES
SUBJECTIVE:   Parvin Perez is a 59 y.o. male presenting for his annual checkup. HPI: BP at work physical was high 160/100s. Is taking his meds as prescribed. c/o intermittent sharp pain across chest and down both arms with exertion. Gets dizzy w the chest pain with exertion. Has hx of thoracic aortic aneurysm, overdue for CT.      Patient Active Problem List   Diagnosis    AAA [facility]abdominal aortic [facility]) ([facility])    Type 2 diabetes mellitus with diabetic polyneuropathy, without long-term current use of insulin ([address]    Hypertension    Hyperlipidemia    Former smoker    Medical non-compliance    Diverticulosis    Polyneuropathy associated with underlying disease ([address]    Aneurysm of descending thoracic aorta ([address]    Atherosclerosis of aorta ([address]       Past Medical History:   Diagnosis Date    AAA (abdominal aortic aneurysm) ([address] 11/28/2015    4.3 cm 9/2019. yearly CT    Aneurysm of descending thoracic aorta ([address] 03/28/2018    2018: 3 cm    Arthritis     Atherosclerosis of aorta ([address] 03/28/2018    extensive per CT 2018    Back pain     Diabetes mellitus ([address]     Diverticulosis     Elevated PSA 10/19/2017    Hyperlipidemia     Hypertension     Polyneuropathy associated with underlying disease ([address] 10/19/2017       Past Surgical History:   Procedure Laterality Date    CARDIAC CATHETERIZATION      Patient staes normal    UPPER GASTROINTESTINAL ENDOSCOPY  11/30/15    with gastric biopsy    UPPER GASTROINTESTINAL ENDOSCOPY N/A 10/12/2018    EGD BIOPSY performed by Nelson Man MD at [address]  5/2017, 7/2017    Dr. Coy Else History     Socioeconomic History    Marital status:      Spouse name: Not on file    Number of children: Not on file    Years of education: Not on file    Highest education level: Not on file   Occupational History    Not on file   Social Needs    Financial resource strain: Not on file    Food insecurity Worry: Not on file     Inability: Not on file    Transportation needs     Medical: Not on file     Non-medical: Not on file   Tobacco Use    Smoking status: Former Smoker     Packs/day: 1.00     Years: 20.00     Pack years: 20.00     Types: Cigarettes     Quit date: 2009     Years since quittin.4    Smokeless tobacco: Never Used   Substance and Sexual Activity    Alcohol use: No    Drug use: No    Sexual activity: Not on file   Lifestyle    Physical activity     Days per week: Not on file     Minutes per session: Not on file    Stress: Not on file   Relationships    Social connections     Talks on phone: Not on file     Gets together: Not on file     Attends Yarsanism service: Not on file     Active member of club or organization: Not on file     Attends meetings of clubs or organizations: Not on file     Relationship status: Not on file    Intimate partner violence     Fear of current or ex partner: Not on file     Emotionally abused: Not on file     Physically abused: Not on file     Forced sexual activity: Not on file   Other Topics Concern    Not on file   Social History Narrative    Not on file       No family history on file.     Current Outpatient Medications   Medication Sig Dispense Refill    amLODIPine (NORVASC) 5 MG tablet TAKE ONE TABLET BY MOUTH DAILY 30 tablet 5    tamsulosin (FLOMAX) 0.4 MG capsule TAKE TWO CAPSULES BY MOUTH DAILY 60 capsule 4    Blood Pressure Monitoring (SPHYGMOMANOMETER) MISC Use as directed 1 each 0    lisinopril (PRINIVIL;ZESTRIL) 40 MG tablet Take 1 tablet by mouth daily 90 tablet 3    metFORMIN (GLUCOPHAGE) 1000 MG tablet Take 1 tablet by mouth 2 times daily (with meals) 180 tablet 3    rosuvastatin (CRESTOR) 20 MG tablet Take 1 tablet by mouth nightly 30 tablet 3    Blood Pressure Monitoring (SPHYGMOMANOMETER) MISC Use as directed 1 each 0    aspirin 81 MG tablet Take 81 mg by mouth daily      traMADol (ULTRAM) 50 MG tablet Take 50 mg by mouth every 6 hours as needed.  glucose monitoring kit (FREESTYLE) monitoring kit 1 each by Does not apply route once for 1 dose 1 kit 0     No current facility-administered medications for this visit. Allergies: Patient has no known allergies. Health Maintenance   Topic Date Due    Diabetic retinal exam  Never done    Shingles Vaccine (1 of 2) Never done    Flu vaccine (Season Ended) 02/08/2022 (Originally 9/1/2021)    Diabetic foot exam  08/12/2021    A1C test (Diabetic or Prediabetic)  08/12/2021    Diabetic microalbuminuria test  08/12/2021    Lipid screen  08/13/2021    Potassium monitoring  08/13/2021    Creatinine monitoring  08/13/2021    DTaP/Tdap/Td vaccine (2 - Td) 07/14/2027    Colon cancer screen colonoscopy  10/12/2027    Pneumococcal 0-64 years Vaccine  Completed    COVID-19 Vaccine  Completed    Hepatitis A vaccine  Aged Out    Hib vaccine  Aged Out    Meningococcal (ACWY) vaccine  Aged Out    Hepatitis C screen  Discontinued    HIV screen  Discontinued       ROS:    Skin: no changing moles, abnormal pigmentation, rash, scaling, itching, masses, hair or nail changes  Eyes: no change in vision  Ears/Nose/Throat: no hearing loss, tinnitus, vertigo, nosebleed, nasal congestion, rhinorrhea, sore throat  Respiratory: no cough or shortness of breath  Cardiovascular: no orthopnea, PND, palpitations, or claudication  Gastrointestinal: no nausea, vomiting, abdominal pain or change in stools. No blood in stools. Genitourinary: no urinary symptoms or incontinence. No erectile dysfunction.   Musculoskeletal: no arthritis, arthralgia, myalgia or weakness  Neurologic: no weakness/numbness, seizures, or headaches  Hematologic/Lymphatic/Immunologic: no abnormal bleeding/bruising, fever, chills, night sweats, swollen glands, or unexplained weight loss  Endocrine: no heat or cold intolerance and no polyphagia, polydipsia, or polyuria    OBJECTIVE:   BP (!) 148/78 (Site: Left Upper Arm, Position: Sitting, Cuff Size: Medium Adult)   Pulse 115   Ht 5' 9\" (1.753 m)   Wt 194 lb (88 kg)   SpO2 99%   BMI 28.65 kg/m²   General appearance: healthy, alert, no distress  Skin: Skin color, texture, turgor normal. No rashes or suspicious lesions. No induration or tightening palpated. Head: Normocephalic. No masses, lesions, tenderness or abnormalities  Eyes: Conjunctivae/corneas clear. PERRL, EOM's intact. Ears: External ears normal. Canals clear. TM's clear bilaterally. Hearing grossly normal.  Nose/Sinuses: Nares normal.   Oropharynx: Lips, mucosa, and tongue normal. Teeth and gums normal. Oropharynx clear with no exudate seen. Neck: Neck supple, and symmetric. No adenopathy. Thyroid symmetric, normal size, without nodule. Trachea is midline. Back: Back symmetric, no curvature. ROM normal. No CVA tenderness. Lungs: Good diaphragmatic excursion. Lungs clear to auscultation bilaterally. No retractions or use of accessory muscles. Heart: PMI is not displaced, and no thrill noted. Regular rate and rhythm, with no rub, murmur or gallop noted. Abdomen: Abdomen soft, non-tender. BS normal. No masses, organomegaly. No hernia noted. Extremities: Extremities normal. No deformities, edema, or skin discoloration. No cyanosis or clubbing noted to the nails. Hands and feet were warm and well-perfused with palpable dorsalis pedis pulses bilaterally. Lymph: No lymphadenopathy of the neck or supraclavicular regions. Musculoskeletal: Spine ROM normal. Muscular strength intact. Neuro: Cranial nerves intact, gait normal. No focal weakness. EKG: sinus tachycardia, LVH. No previous tracings for comparison. ASSESSMENT/PLAN:  1. Annual physical exam    2. Chest pain, unspecified type  High risk for CAD  Will send to cardiology for further workup  To ER if worsening symptoms  - EKG 12 Lead  - Ambulatory referral to Cardiology    3.  Tachycardia  Start BB   - metoprolol succinate (TOPROL XL) 25 MG extended release tablet; Take 1 tablet by mouth daily  Dispense: 30 tablet; Refill: 5    4. Type 2 diabetes mellitus with diabetic polyneuropathy, without long-term current use of insulin ([facility]  Overdue for labs as previously ordered  - Ambulatory referral to Cardiology    5. Essential hypertension  Uncontrolled  Add BB  - Ambulatory referral to Cardiology  - metoprolol succinate (TOPROL XL) 25 MG extended release tablet; Take 1 tablet by mouth daily  Dispense: 30 tablet; Refill: 5    6. Hyperlipidemia, unspecified hyperlipidemia type  Overdue for labs  - Ambulatory referral to Cardiology    7. Aneurysm of descending thoracic aorta ([facility]  Overdue for CT and f/u w Dr. Queen Escobedo, pt reminded to make these appts  - EKG 12 Lead  - Ambulatory referral to Cardiology        All health maintenance issues were updated.   Recommend follow a low fat, low cholesterol diet and continue current medications

## 2021-05-12 PROBLEM — I48.19 PERSISTENT ATRIAL FIBRILLATION (HCC): Status: ACTIVE | Noted: 2021-05-12

## 2021-05-12 NOTE — PROGRESS NOTES
[address]    H+P // CONSULT // OUTPATIENT VISIT // Jayy Mckeon     Referring Doctor Gely Bolden MD   Encounter Type New     CHIEF COMPLAINT     Visit Type Acute   Symptoms CP   Problems CP, AAA, HTN, CHOL     HISTORY OF PRESENT ILLNESS      GEN - New patient for cp. Cp pressure, nonradiating, substernal, associated with sob, exertional, relieved with rest, lasts 5 minutes or so. Risks - dm, htn, chol, hx tob, ?family hx. EKG in pcp office with anterior TWI, resolved today   AAA - 4.3. Follows with fries.  HTN - Ambulatory BP readings in good range. No HA or dizziness.  CHOL - Last cholesterol reviewed and in good range. Tolerating statin without side effects.  MED - Compliant with CV meds listed below without notable side effects. HISTORY/ALLERGIES/ROS     MedHx:   has a past medical history of AAA (abdominal aortic aneurysm) ([address] Aneurysm of descending thoracic aorta ([address] Arthritis, Atherosclerosis of aorta ([address] Back pain, Diabetes mellitus ([address] Diverticulosis, Elevated PSA, Hyperlipidemia, Hypertension, and Polyneuropathy associated with underlying disease ([address] SurgHx:  has a past surgical history that includes Upper gastrointestinal endoscopy (11/30/15); Urethra dilation (5/2017, 7/2017); Cardiac catheterization; and Upper gastrointestinal endoscopy (N/A, 10/12/2018). SocHx:   reports that he quit smoking about 11 years ago. His smoking use included cigarettes. He has a 20.00 pack-year smoking history. He has never used smokeless tobacco. He reports that he does not drink alcohol or use drugs. FamHx:  family history is not on file. Allergies: Patient has no known allergies.    ROS:  [x]Full ROS obtained and negative except as mentioned in HPI     MEDICATIONS      Current Outpatient Medications   Medication Sig Dispense Refill    metoprolol succinate (TOPROL XL) 25 MG extended release tablet Take 1 tablet by mouth daily 30 tablet 5    amLODIPine (NORVASC) 5 MG tablet TAKE ONE TABLET BY MOUTH DAILY 30 tablet 5    tamsulosin (FLOMAX) 0.4 MG capsule TAKE TWO CAPSULES BY MOUTH DAILY 60 capsule 4    Blood Pressure Monitoring (SPHYGMOMANOMETER) MISC Use as directed 1 each 0    lisinopril (PRINIVIL;ZESTRIL) 40 MG tablet Take 1 tablet by mouth daily 90 tablet 3    metFORMIN (GLUCOPHAGE) 1000 MG tablet Take 1 tablet by mouth 2 times daily (with meals) 180 tablet 3    rosuvastatin (CRESTOR) 20 MG tablet Take 1 tablet by mouth nightly 30 tablet 3    glucose monitoring kit (FREESTYLE) monitoring kit 1 each by Does not apply route once for 1 dose 1 kit 0    Blood Pressure Monitoring (SPHYGMOMANOMETER) MISC Use as directed 1 each 0    aspirin 81 MG tablet Take 81 mg by mouth daily      traMADol (ULTRAM) 50 MG tablet Take 50 mg by mouth every 6 hours as needed. No current facility-administered medications for this visit. Reviewed with patient and will remain unchanged except as mentioned in A/P  PHYSICAL EXAM     Vitals:    05/13/21 1458   BP: (!) 150/82   Pulse: 83   SpO2: 97%      Gen Alert, coop, no distress Heart  Rrr.  No mrg   Head NC, AT, no abnorm Abd  Soft, NT, +BS, no mass, no OM   Eyes PER, conj/corn clear Ext  Ext nl, AT, no C/C/E   Nose Nares nl, no drain, NT Pulse 2+ and symmetric   Throat Lips, mucosa, tongue nl Skin Col/text/turg nl, no vis rash/les   Neck S/S, TM, NT, no bruit/JVD Psych Nl mood and affect   Lung CTA-B, unlabored, no DTP Lymph   No cervical or axillary LA   Ch wall NT, no deform Neuro  Nl gross M/S exam     ASSESSMENT AND PLAN     *CP   Date EF Results   Sx   Typical, crescendo cp, ongoing ischemic symptoms   Data   Most recent EKG, ECHO and LHC reviewed personally   LHC   None   MPI 10/13 60% No ischemia   TTE 10/13 55% Mild to mod TR   Plan   LH, R/B/O discussed   *AAA  CT 9/19 AAA 4.3 cm   Follows with Dr. Janna Beck  *HTN  Status Controlled, vitals and available ambulatory monitoring logs reviewed personally  Plan Counseled on diet/salt/exercise/weight, continue meds at doses above  *CHOL  Status  Uncontrolled with last LDL of 185 (goal <70) and HDL of 50 (8/20), labs reviewed personally  Plan Counseled on diet/exercise/weight, continue high-intensity statin, lipid/liver surveillance per PCP  *COMPLIANCE  Status Compliant  Plan Discussed importance of compliance with meds/diet/salt/exercise; avoid tob/alc/drugs; patient verbalized understanding  *FOLLOWUP  After testing    [address] Vasquez [address]ayer, am scribing for and in the presence of Tate Pinto MD.   SignedVasquez 05/12/21 1:53 PM   Provider Michel Saba is working as a scribe for and in the presence of me (Tate Pinto MD). Working as a scribe, Vasquez Ledbetter may have prepopulated components of this note with my historical  intellectual property under my direct supervision. Any additions to this intellectual property were performed in my presence and at my direction.   Furthermore, the content and accuracy of this note have been reviewed by me Tate Pinto MD).  5/13/2021 3:26 PM

## 2021-05-13 ENCOUNTER — TELEPHONE (OUTPATIENT)
Dept: VASCULAR SURGERY | Age: 61
End: 2021-05-13

## 2021-05-13 ENCOUNTER — OFFICE VISIT (OUTPATIENT)
Dept: CARDIOLOGY CLINIC | Age: 61
End: 2021-05-13
Payer: COMMERCIAL

## 2021-05-13 VITALS
DIASTOLIC BLOOD PRESSURE: 82 MMHG | WEIGHT: 192.1 LBS | SYSTOLIC BLOOD PRESSURE: 150 MMHG | BODY MASS INDEX: 28.45 KG/M2 | HEART RATE: 83 BPM | OXYGEN SATURATION: 97 % | HEIGHT: 69 IN

## 2021-05-13 DIAGNOSIS — E78.00 HYPERCHOLESTEREMIA: ICD-10-CM

## 2021-05-13 DIAGNOSIS — R07.9 CHEST PAIN, UNSPECIFIED TYPE: Primary | ICD-10-CM

## 2021-05-13 DIAGNOSIS — I71.40 ABDOMINAL AORTIC ANEURYSM WITHOUT RUPTURE: Primary | ICD-10-CM

## 2021-05-13 DIAGNOSIS — E11.42 TYPE 2 DIABETES MELLITUS WITH DIABETIC POLYNEUROPATHY, WITHOUT LONG-TERM CURRENT USE OF INSULIN (HCC): ICD-10-CM

## 2021-05-13 DIAGNOSIS — E78.5 HYPERLIPIDEMIA, UNSPECIFIED HYPERLIPIDEMIA TYPE: ICD-10-CM

## 2021-05-13 DIAGNOSIS — I10 ESSENTIAL HYPERTENSION: ICD-10-CM

## 2021-05-13 DIAGNOSIS — I48.19 PERSISTENT ATRIAL FIBRILLATION (HCC): ICD-10-CM

## 2021-05-13 DIAGNOSIS — I71.40 ABDOMINAL AORTIC ANEURYSM (AAA) WITHOUT RUPTURE: ICD-10-CM

## 2021-05-13 DIAGNOSIS — Z12.5 PROSTATE CANCER SCREENING: ICD-10-CM

## 2021-05-13 LAB
A/G RATIO: 1.7 (ref 1.1–2.2)
ALBUMIN SERPL-MCNC: 3.9 G/DL (ref 3.4–5)
ALP BLD-CCNC: 61 U/L (ref 40–129)
ALT SERPL-CCNC: 12 U/L (ref 10–40)
ANION GAP SERPL CALCULATED.3IONS-SCNC: 10 MMOL/L (ref 3–16)
AST SERPL-CCNC: 14 U/L (ref 15–37)
BASOPHILS ABSOLUTE: 0 K/UL (ref 0–0.2)
BASOPHILS RELATIVE PERCENT: 0.4 %
BILIRUB SERPL-MCNC: <0.2 MG/DL (ref 0–1)
BUN BLDV-MCNC: 16 MG/DL (ref 7–20)
CALCIUM SERPL-MCNC: 9 MG/DL (ref 8.3–10.6)
CHLORIDE BLD-SCNC: 105 MMOL/L (ref 99–110)
CHOLESTEROL, TOTAL: 232 MG/DL (ref 0–199)
CO2: 24 MMOL/L (ref 21–32)
CREAT SERPL-MCNC: 0.9 MG/DL (ref 0.8–1.3)
EOSINOPHILS ABSOLUTE: 0.2 K/UL (ref 0–0.6)
EOSINOPHILS RELATIVE PERCENT: 2.2 %
GFR AFRICAN AMERICAN: >60
GFR NON-AFRICAN AMERICAN: >60
GLOBULIN: 2.3 G/DL
GLUCOSE BLD-MCNC: 92 MG/DL (ref 70–99)
HCT VFR BLD CALC: 31.2 % (ref 40.5–52.5)
HDLC SERPL-MCNC: 52 MG/DL (ref 40–60)
HEMOGLOBIN: 9.7 G/DL (ref 13.5–17.5)
LDL CHOLESTEROL CALCULATED: 163 MG/DL
LYMPHOCYTES ABSOLUTE: 2.4 K/UL (ref 1–5.1)
LYMPHOCYTES RELATIVE PERCENT: 36.3 %
MCH RBC QN AUTO: 22.6 PG (ref 26–34)
MCHC RBC AUTO-ENTMCNC: 31.2 G/DL (ref 31–36)
MCV RBC AUTO: 72.5 FL (ref 80–100)
MONOCYTES ABSOLUTE: 0.4 K/UL (ref 0–1.3)
MONOCYTES RELATIVE PERCENT: 5.8 %
NEUTROPHILS ABSOLUTE: 3.7 K/UL (ref 1.7–7.7)
NEUTROPHILS RELATIVE PERCENT: 55.3 %
PDW BLD-RTO: 18.5 % (ref 12.4–15.4)
PLATELET # BLD: 171 K/UL (ref 135–450)
PMV BLD AUTO: 10 FL (ref 5–10.5)
POTASSIUM SERPL-SCNC: 5.5 MMOL/L (ref 3.5–5.1)
PROSTATE SPECIFIC ANTIGEN: 0.88 NG/ML (ref 0–4)
RBC # BLD: 4.31 M/UL (ref 4.2–5.9)
SODIUM BLD-SCNC: 139 MMOL/L (ref 136–145)
TOTAL PROTEIN: 6.2 G/DL (ref 6.4–8.2)
TRIGL SERPL-MCNC: 85 MG/DL (ref 0–150)
VLDLC SERPL CALC-MCNC: 17 MG/DL
WBC # BLD: 6.7 K/UL (ref 4–11)

## 2021-05-13 PROCEDURE — 99205 OFFICE O/P NEW HI 60 MIN: CPT | Performed by: INTERNAL MEDICINE

## 2021-05-13 PROCEDURE — 93000 ELECTROCARDIOGRAM COMPLETE: CPT | Performed by: INTERNAL MEDICINE

## 2021-05-13 RX ORDER — IBUPROFEN 400 MG/1
400 TABLET ORAL EVERY 6 HOURS PRN
Status: ON HOLD | COMMUNITY
End: 2021-06-02 | Stop reason: HOSPADM

## 2021-05-14 LAB
ESTIMATED AVERAGE GLUCOSE: 145.6 MG/DL
HBA1C MFR BLD: 6.7 %

## 2021-05-17 DIAGNOSIS — E78.00 HYPERCHOLESTEREMIA: Primary | ICD-10-CM

## 2021-05-17 DIAGNOSIS — D50.9 IRON DEFICIENCY ANEMIA, UNSPECIFIED IRON DEFICIENCY ANEMIA TYPE: ICD-10-CM

## 2021-05-17 RX ORDER — FERROUS SULFATE 325(65) MG
325 TABLET ORAL
Qty: 30 TABLET | Refills: 5 | Status: SHIPPED | OUTPATIENT
Start: 2021-05-17 | End: 2021-11-10 | Stop reason: SDUPTHER

## 2021-05-17 RX ORDER — ROSUVASTATIN CALCIUM 20 MG/1
20 TABLET, COATED ORAL NIGHTLY
Qty: 30 TABLET | Refills: 3 | Status: SHIPPED | OUTPATIENT
Start: 2021-05-17 | End: 2021-07-28

## 2021-05-21 ENCOUNTER — APPOINTMENT (OUTPATIENT)
Dept: GENERAL RADIOLOGY | Age: 61
DRG: 234 | End: 2021-05-21
Attending: INTERNAL MEDICINE
Payer: COMMERCIAL

## 2021-05-21 ENCOUNTER — HOSPITAL ENCOUNTER (INPATIENT)
Dept: CARDIAC CATH/INVASIVE PROCEDURES | Age: 61
LOS: 12 days | Discharge: HOME HEALTH | DRG: 234 | End: 2021-06-02
Attending: INTERNAL MEDICINE | Admitting: INTERNAL MEDICINE
Payer: COMMERCIAL

## 2021-05-21 DIAGNOSIS — Z95.1 S/P CABG X 4: Primary | ICD-10-CM

## 2021-05-21 DIAGNOSIS — Z98.890 S/P LEFT ATRIAL APPENDAGE LIGATION: ICD-10-CM

## 2021-05-21 PROBLEM — I20.0 UNSTABLE ANGINA (HCC): Status: ACTIVE | Noted: 2021-05-21

## 2021-05-21 LAB
ANION GAP SERPL CALCULATED.3IONS-SCNC: 10 MMOL/L (ref 3–16)
BUN BLDV-MCNC: 19 MG/DL (ref 7–20)
CALCIUM SERPL-MCNC: 8.8 MG/DL (ref 8.3–10.6)
CHLORIDE BLD-SCNC: 105 MMOL/L (ref 99–110)
CO2: 23 MMOL/L (ref 21–32)
CREAT SERPL-MCNC: 0.8 MG/DL (ref 0.8–1.3)
EKG ATRIAL RATE: 90 BPM
EKG DIAGNOSIS: NORMAL
EKG P AXIS: 67 DEGREES
EKG P-R INTERVAL: 144 MS
EKG Q-T INTERVAL: 330 MS
EKG QRS DURATION: 82 MS
EKG QTC CALCULATION (BAZETT): 403 MS
EKG R AXIS: -7 DEGREES
EKG T AXIS: 43 DEGREES
EKG VENTRICULAR RATE: 90 BPM
GFR AFRICAN AMERICAN: >60
GFR NON-AFRICAN AMERICAN: >60
GLUCOSE BLD-MCNC: 107 MG/DL (ref 70–99)
HCT VFR BLD CALC: 31.4 % (ref 40.5–52.5)
HEMOGLOBIN: 9.9 G/DL (ref 13.5–17.5)
LEFT VENTRICULAR EJECTION FRACTION MODE: NORMAL
LV EF: 55 %
MCH RBC QN AUTO: 22.9 PG (ref 26–34)
MCHC RBC AUTO-ENTMCNC: 31.5 G/DL (ref 31–36)
MCV RBC AUTO: 72.7 FL (ref 80–100)
PDW BLD-RTO: 18.4 % (ref 12.4–15.4)
PLATELET # BLD: 198 K/UL (ref 135–450)
PMV BLD AUTO: 9.4 FL (ref 5–10.5)
POTASSIUM SERPL-SCNC: 4.5 MMOL/L (ref 3.5–5.1)
RBC # BLD: 4.32 M/UL (ref 4.2–5.9)
SODIUM BLD-SCNC: 138 MMOL/L (ref 136–145)
WBC # BLD: 6.8 K/UL (ref 4–11)

## 2021-05-21 PROCEDURE — 2580000003 HC RX 258: Performed by: INTERNAL MEDICINE

## 2021-05-21 PROCEDURE — C1769 GUIDE WIRE: HCPCS

## 2021-05-21 PROCEDURE — 93880 EXTRACRANIAL BILAT STUDY: CPT

## 2021-05-21 PROCEDURE — 6370000000 HC RX 637 (ALT 250 FOR IP): Performed by: INTERNAL MEDICINE

## 2021-05-21 PROCEDURE — 71045 X-RAY EXAM CHEST 1 VIEW: CPT

## 2021-05-21 PROCEDURE — 2000000000 HC ICU R&B

## 2021-05-21 PROCEDURE — 93971 EXTREMITY STUDY: CPT

## 2021-05-21 PROCEDURE — 2709999900 HC NON-CHARGEABLE SUPPLY

## 2021-05-21 PROCEDURE — 2100000000 HC CCU R&B

## 2021-05-21 PROCEDURE — 99152 MOD SED SAME PHYS/QHP 5/>YRS: CPT

## 2021-05-21 PROCEDURE — 6360000002 HC RX W HCPCS

## 2021-05-21 PROCEDURE — 93458 L HRT ARTERY/VENTRICLE ANGIO: CPT | Performed by: INTERNAL MEDICINE

## 2021-05-21 PROCEDURE — 36415 COLL VENOUS BLD VENIPUNCTURE: CPT

## 2021-05-21 PROCEDURE — B2111ZZ FLUOROSCOPY OF MULTIPLE CORONARY ARTERIES USING LOW OSMOLAR CONTRAST: ICD-10-PCS | Performed by: INTERNAL MEDICINE

## 2021-05-21 PROCEDURE — 80048 BASIC METABOLIC PNL TOTAL CA: CPT

## 2021-05-21 PROCEDURE — 99152 MOD SED SAME PHYS/QHP 5/>YRS: CPT | Performed by: INTERNAL MEDICINE

## 2021-05-21 PROCEDURE — B2151ZZ FLUOROSCOPY OF LEFT HEART USING LOW OSMOLAR CONTRAST: ICD-10-PCS | Performed by: INTERNAL MEDICINE

## 2021-05-21 PROCEDURE — 93010 ELECTROCARDIOGRAM REPORT: CPT | Performed by: INTERNAL MEDICINE

## 2021-05-21 PROCEDURE — 93005 ELECTROCARDIOGRAM TRACING: CPT | Performed by: INTERNAL MEDICINE

## 2021-05-21 PROCEDURE — 2500000003 HC RX 250 WO HCPCS

## 2021-05-21 PROCEDURE — C1894 INTRO/SHEATH, NON-LASER: HCPCS

## 2021-05-21 PROCEDURE — 99153 MOD SED SAME PHYS/QHP EA: CPT

## 2021-05-21 PROCEDURE — 6360000004 HC RX CONTRAST MEDICATION: Performed by: INTERNAL MEDICINE

## 2021-05-21 PROCEDURE — 4A023N7 MEASUREMENT OF CARDIAC SAMPLING AND PRESSURE, LEFT HEART, PERCUTANEOUS APPROACH: ICD-10-PCS | Performed by: INTERNAL MEDICINE

## 2021-05-21 PROCEDURE — 85027 COMPLETE CBC AUTOMATED: CPT

## 2021-05-21 PROCEDURE — 85347 COAGULATION TIME ACTIVATED: CPT

## 2021-05-21 PROCEDURE — 93458 L HRT ARTERY/VENTRICLE ANGIO: CPT

## 2021-05-21 RX ORDER — ACETAMINOPHEN 325 MG/1
650 TABLET ORAL EVERY 4 HOURS PRN
Status: DISCONTINUED | OUTPATIENT
Start: 2021-05-21 | End: 2021-05-25

## 2021-05-21 RX ORDER — METOPROLOL SUCCINATE 25 MG/1
25 TABLET, EXTENDED RELEASE ORAL DAILY
Status: DISCONTINUED | OUTPATIENT
Start: 2021-05-21 | End: 2021-05-25

## 2021-05-21 RX ORDER — SODIUM CHLORIDE 0.9 % (FLUSH) 0.9 %
5-40 SYRINGE (ML) INJECTION PRN
Status: DISCONTINUED | OUTPATIENT
Start: 2021-05-21 | End: 2021-05-25

## 2021-05-21 RX ORDER — SODIUM CHLORIDE 9 MG/ML
25 INJECTION, SOLUTION INTRAVENOUS PRN
Status: DISCONTINUED | OUTPATIENT
Start: 2021-05-21 | End: 2021-05-25

## 2021-05-21 RX ORDER — ASPIRIN 81 MG/1
81 TABLET, CHEWABLE ORAL DAILY
Status: DISCONTINUED | OUTPATIENT
Start: 2021-05-21 | End: 2021-05-25

## 2021-05-21 RX ORDER — LISINOPRIL 20 MG/1
40 TABLET ORAL DAILY
Status: DISCONTINUED | OUTPATIENT
Start: 2021-05-21 | End: 2021-05-24

## 2021-05-21 RX ORDER — AMLODIPINE BESYLATE 5 MG/1
5 TABLET ORAL DAILY
Status: DISCONTINUED | OUTPATIENT
Start: 2021-05-21 | End: 2021-05-25

## 2021-05-21 RX ORDER — TAMSULOSIN HYDROCHLORIDE 0.4 MG/1
0.4 CAPSULE ORAL DAILY
Status: DISCONTINUED | OUTPATIENT
Start: 2021-05-21 | End: 2021-06-02 | Stop reason: HOSPADM

## 2021-05-21 RX ORDER — ROSUVASTATIN CALCIUM 20 MG/1
20 TABLET, COATED ORAL NIGHTLY
Status: DISCONTINUED | OUTPATIENT
Start: 2021-05-21 | End: 2021-05-21 | Stop reason: ALTCHOICE

## 2021-05-21 RX ORDER — SODIUM CHLORIDE 0.9 % (FLUSH) 0.9 %
5-40 SYRINGE (ML) INJECTION EVERY 12 HOURS SCHEDULED
Status: DISCONTINUED | OUTPATIENT
Start: 2021-05-21 | End: 2021-06-02 | Stop reason: HOSPADM

## 2021-05-21 RX ADMIN — TAMSULOSIN HYDROCHLORIDE 0.4 MG: 0.4 CAPSULE ORAL at 20:21

## 2021-05-21 RX ADMIN — Medication 10 ML: at 20:21

## 2021-05-21 RX ADMIN — IOPAMIDOL 58 ML: 755 INJECTION, SOLUTION INTRAVENOUS at 13:26

## 2021-05-21 ASSESSMENT — PAIN SCALES - GENERAL
PAINLEVEL_OUTOF10: 0

## 2021-05-21 NOTE — LETTER
[facility]  [address]  Phone: [phone]    [facility] CARDIAC CATH LAB ROOM 1        May 26, 2021     Patient: Reed Gracia   YOB: 1960   Date of Visit: 5/21/2021       To Whom It May Concern: It is my medical opinion that Reed Gracia is unable to perform jury duty at this time for at least 3 months given his current medical condition and recent surgery. If you have any questions or concerns, please don't hesitate to call.     Sincerely,        Electronically signed by CHARLOTTE Shepherd CNP on 5/26/21 at 11:41 AM EDT

## 2021-05-21 NOTE — PLAN OF CARE
Problem: Falls - Risk of:  Goal: Will remain free from falls  Outcome: Ongoing  Note: Fall Risk = medium; Up as tolerated; Patient has steady gait; Ambulation Equipment: None; Call-light within reach and patient uses call-light for assistance; Bed alarm: YES; Non-skid socks worn when out of bed; Side rails up 2/4; Educated patient on fall risk status and need to call for assistance;  Patient verbalizes understanding of fall risk status and fall education; Pt experienced no falls/injuries this shift      Problem: Bleeding:  Goal: Will show no signs and symptoms of excessive bleeding  Outcome: Ongoing  Note: Pt showing no S/S of active bleeding

## 2021-05-21 NOTE — SEDATION DOCUMENTATION
Brief Pre-Op Note/Sedation Assessment       Patient Demographics     Kun Merrill 1960 Room/bed info not found [id number] 9:28 AM     Procedure     Planned Procedure:  Cardiac Catheterization Procedure  Post Procedure Plan:  Return to same level of care  Consent:   I have discussed with the patient and/or the patient representative the indication, alternatives, and the possible risks and/or complications of the planned procedure and the anesthesia methods. The patient and/or patient representative appear to understand and agree to proceed. Indications:   *CAD Presentation: New Onset Angina <= 2 months, Worsening Angina and Suspected CAD, ongoing ischemic symptoms  *Angina Class(2 wks): CCS II - Slight limitation, with angina only during vigorous physical activity  *NYHA Class(2wks): Class I - Symptoms of HF only at levels that would limit normal individuals (asymptomatic), Newly Diagnosed?  Yes, Heart Failure Type: Unknown  *Stress/Imaging Tests: None    Patient History     Chief Complaint:  Chest Pain/Pressure  Anginal Equivalent  Dyspnea on Exertion  Dyspnea ongoing ischemic symptoms    Allergies:  No Known Allergies  Past Medical History:   Past Medical History:   Diagnosis Date    AAA (abdominal aortic aneurysm) ([address] 11/28/2015    4.3 cm 9/2019. yearly CT    Aneurysm of descending thoracic aorta ([address] 03/28/2018    2018: 3 cm    Arthritis     Atherosclerosis of aorta ([address] 03/28/2018    extensive per CT 2018    Back pain     Diabetes mellitus ([address]     Diverticulosis     Elevated PSA 10/19/2017    Hyperlipidemia     Hypertension     Polyneuropathy associated with underlying disease ([address] 10/19/2017     Surgical History:   Past Surgical History:   Procedure Laterality Date    CARDIAC CATHETERIZATION      Patient staes normal    UPPER GASTROINTESTINAL ENDOSCOPY  11/30/15    with gastric biopsy    UPPER GASTROINTESTINAL ENDOSCOPY N/A 10/12/2018    EGD BIOPSY performed by Cindy Lyons MD at [facility] [facility] ENDOSCOPY    URETHRAL STRICTURE DILATATION  5/2017, 7/2017    Dr. Armond Lyon     Medications:    Current Outpatient Medications   Medication Sig Dispense Refill    rosuvastatin (CRESTOR) 20 MG tablet Take 1 tablet by mouth nightly 30 tablet 3    ferrous sulfate (IRON 325) 325 (65 Fe) MG tablet Take 1 tablet by mouth daily (with breakfast) 30 tablet 5    ibuprofen (ADVIL;MOTRIN) 400 MG tablet Take 400 mg by mouth every 6 hours as needed for Pain      metoprolol succinate (TOPROL XL) 25 MG extended release tablet Take 1 tablet by mouth daily (Patient not taking: Reported on 5/13/2021) 30 tablet 5    amLODIPine (NORVASC) 5 MG tablet TAKE ONE TABLET BY MOUTH DAILY 30 tablet 5    tamsulosin (FLOMAX) 0.4 MG capsule TAKE TWO CAPSULES BY MOUTH DAILY 60 capsule 4    Blood Pressure Monitoring (SPHYGMOMANOMETER) MISC Use as directed (Patient not taking: Reported on 5/13/2021) 1 each 0    lisinopril (PRINIVIL;ZESTRIL) 40 MG tablet Take 1 tablet by mouth daily 90 tablet 3    metFORMIN (GLUCOPHAGE) 1000 MG tablet Take 1 tablet by mouth 2 times daily (with meals) 180 tablet 3    glucose monitoring kit (FREESTYLE) monitoring kit 1 each by Does not apply route once for 1 dose 1 kit 0    Blood Pressure Monitoring (SPHYGMOMANOMETER) MISC Use as directed (Patient not taking: Reported on 5/13/2021) 1 each 0    aspirin 81 MG tablet Take 81 mg by mouth daily      traMADol (ULTRAM) 50 MG tablet Take 50 mg by mouth every 6 hours as needed. No current facility-administered medications for this encounter. Anti-Anginal(2wks):  ANTI-ANGINAL MEDS: Yes: Beta Blockers, Ca Channel Blockers, Aspirin and Statin (Any)    Pre-Sedation Assessment     Vital Signs:  BP (!) 183/90   Pulse 90   Temp 97.9 °F (36.6 °C) (Bladder)   Resp 18   Ht 5' 9\" (1.753 m)   Wt 192 lb (87.1 kg)   SpO2 100%   BMI 28.35 kg/m²   Pre-Sedation Exam: I have personally completed a history, physical exam & review of systems for this patient (see

## 2021-05-21 NOTE — H&P
[address]    H+P // CONSULT // OUTPATIENT VISIT // Kaci Stanton     Referring Doctor Riccardo Riley MD   Encounter Type New     CHIEF COMPLAINT     Visit Type Acute   Symptoms Unstable angina   Problems CP, AAA, HTN, CHOL     HISTORY OF PRESENT ILLNESS      GEN - New patient for cp. Cp pressure, nonradiating, substernal, associated with sob, exertional, relieved with rest, lasts 5 minutes or so. Risks - dm, htn, chol, hx tob, ?family hx. EKG in pcp office with anterior TWI, resolved today   AAA - 4.3. Follows with fries.  HTN - Ambulatory BP readings in good range. No HA or dizziness.  CHOL - Last cholesterol reviewed and in good range. Tolerating statin without side effects.  MED - Compliant with CV meds listed below without notable side effects. HISTORY/ALLERGIES/ROS     MedHx:   has a past medical history of AAA (abdominal aortic aneurysm) ([address] Aneurysm of descending thoracic aorta ([address] Arthritis, Atherosclerosis of aorta ([address] Back pain, Diabetes mellitus ([address] Diverticulosis, Elevated PSA, Hyperlipidemia, Hypertension, and Polyneuropathy associated with underlying disease ([address] SurgHx:  has a past surgical history that includes Upper gastrointestinal endoscopy (11/30/15); Urethra dilation (5/2017, 7/2017); Cardiac catheterization; and Upper gastrointestinal endoscopy (N/A, 10/12/2018). SocHx:   reports that he quit smoking about 11 years ago. His smoking use included cigarettes. He has a 20.00 pack-year smoking history. He has never used smokeless tobacco. He reports that he does not drink alcohol and does not use drugs. FamHx:  family history is not on file. Allergies: Patient has no known allergies.    ROS:  [x]Full ROS obtained and negative except as mentioned in HPI     MEDICATIONS      Current Outpatient Medications   Medication Sig Dispense Refill    rosuvastatin (CRESTOR) 20 MG tablet Take 1 tablet by mouth nightly 30 tablet 3    ferrous sulfate (IRON 325) 325 (65 Fe) MG tablet Take 1 tablet by mouth daily (with breakfast) 30 tablet 5    ibuprofen (ADVIL;MOTRIN) 400 MG tablet Take 400 mg by mouth every 6 hours as needed for Pain      metoprolol succinate (TOPROL XL) 25 MG extended release tablet Take 1 tablet by mouth daily (Patient not taking: Reported on 5/13/2021) 30 tablet 5    amLODIPine (NORVASC) 5 MG tablet TAKE ONE TABLET BY MOUTH DAILY 30 tablet 5    tamsulosin (FLOMAX) 0.4 MG capsule TAKE TWO CAPSULES BY MOUTH DAILY 60 capsule 4    Blood Pressure Monitoring (SPHYGMOMANOMETER) MISC Use as directed (Patient not taking: Reported on 5/13/2021) 1 each 0    lisinopril (PRINIVIL;ZESTRIL) 40 MG tablet Take 1 tablet by mouth daily 90 tablet 3    metFORMIN (GLUCOPHAGE) 1000 MG tablet Take 1 tablet by mouth 2 times daily (with meals) 180 tablet 3    glucose monitoring kit (FREESTYLE) monitoring kit 1 each by Does not apply route once for 1 dose 1 kit 0    Blood Pressure Monitoring (SPHYGMOMANOMETER) MISC Use as directed (Patient not taking: Reported on 5/13/2021) 1 each 0    aspirin 81 MG tablet Take 81 mg by mouth daily      traMADol (ULTRAM) 50 MG tablet Take 50 mg by mouth every 6 hours as needed. No current facility-administered medications for this encounter. Reviewed with patient and will remain unchanged except as mentioned in A/P  PHYSICAL EXAM     There were no vitals filed for this visit. Gen Alert, coop, no distress Heart  Rrr.  No mrg   Head NC, AT, no abnorm Abd  Soft, NT, +BS, no mass, no OM   Eyes PER, conj/corn clear Ext  Ext nl, AT, no C/C/E   Nose Nares nl, no drain, NT Pulse 2+ and symmetric   Throat Lips, mucosa, tongue nl Skin Col/text/turg nl, no vis rash/les   Neck S/S, TM, NT, no bruit/JVD Psych Nl mood and affect   Lung CTA-B, unlabored, no DTP Lymph   No cervical or axillary LA   Ch wall NT, no deform Neuro  Nl gross M/S exam     ASSESSMENT AND PLAN     *CP   Date EF Results   Sx   Typical, crescendo cp, ongoing ischemic symptoms   Data   Most recent EKG, ECHO and LHC reviewed personally   LHC   None   MPI 10/13 60% No ischemia   TTE 10/13 55% Mild to mod TR   Plan   [facility], R/B/O discussed   *AAA  CT 9/19 AAA 4.3 cm   Follows with Dr. Jovani Harrington  *HTN  Status Controlled, vitals and available ambulatory monitoring logs reviewed personally  Plan Counseled on diet/salt/exercise/weight, continue meds at doses above  *CHOL  Status  Uncontrolled with last LDL of 185 (goal <70) and HDL of 50 (8/20), labs reviewed personally  Plan Counseled on diet/exercise/weight, continue high-intensity statin, lipid/liver surveillance per PCP

## 2021-05-21 NOTE — CONSULTS
Cardiothoracic Surgery Consultation           PCP: Vahid Durham MD    Referring Physician: Dr. Arnaldo Logan    DIAGNOSIS:  [place], multivessel coronary artery disease    CHIEF COMPLAINT:  Chest pain     History Obtained From:  patient, electronic medical record    HISTORY OF PRESENT ILLNESS:      The patient is a 59 y.o. male with significant past medical history of DM2, diverticulosis, HTN, HLD, AAA (4.3cm 9/2019, follows with Dr. Arvell Harada), elevated PSA (urethral dilation, on flomax)  who presents for an outpatient left heart angiogram on 5/21 with chest pain. Patient had his annual PCP visit where he was found to be hypertensive and complained of sharp chest and bilateral arm pain. PCP referred him to cardiology for further work-up. Angiogram found  severe multivessel coronary artery disease. CVTS was consulted for surgical evaluation for myocardial revascularization. Patient is currently hemodynamically stable and denying any complaints of chest pain or shortness of breath. Patient is a former smoker. Patient has never tested positive for Covid and has  been vaccinated for covid with the last dose more than 14 days ago.     Past Medical History:        Diagnosis Date    AAA (abdominal aortic aneurysm) ([address] 11/28/2015    4.3 cm 9/2019. yearly CT    Aneurysm of descending thoracic aorta ([address] 03/28/2018    2018: 3 cm    Arthritis     Atherosclerosis of aorta ([address] 03/28/2018    extensive per CT 2018    Back pain     Diabetes mellitus ([address]     Diverticulosis     Elevated PSA 10/19/2017    Hyperlipidemia     Hypertension     Polyneuropathy associated with underlying disease ([address] 10/19/2017       Past Surgical History:        Procedure Laterality Date    CARDIAC CATHETERIZATION      Patient staes normal    UPPER GASTROINTESTINAL ENDOSCOPY  11/30/15    with gastric biopsy    UPPER GASTROINTESTINAL ENDOSCOPY N/A 10/12/2018    EGD BIOPSY performed by Damian Reeves MD at [facility] smoking about 13 years ago. His smoking use included cigarettes. He smoked 1ppd from age 11 to age 37. ETOH:   reports no history of alcohol use. DRUGS:   reports no history of drug use. LIFESTYLE: sedentary   MARITAL STATUS:   OCCUPATION:  Works full-time - heavy lifting     Family History:    No family history on file.     REVIEW OF SYSTEMS:    CONSTITUTIONAL:  Weight gain 8-10 lbs over last 3-4 months   DERMATOLOGICAL: negative  NEUROLOGICAL:  negative  EYES:  positive for  glasses  HEENT:  negative  RESPIRATORY:  positive for shortness of breath   CARDIOVASCULAR:  positive for chest pain  GASTROINTESTINAL:  negative  GENITO-URINARY: nocturnal urinary frequency   ENDOCRINE: negative  MUSCULOSKELETAL:  Pain left hip and leg - has been told it is \"nerve pain\"  HEMATOLOGICAL AND LYMPHATIC: negative  IMMUNOLOGICAL: negative  PSYCHOLOGICAL: negative    PHYSICAL EXAM:    VITALS:  BP (!) 183/90   Pulse 90   Temp 97.9 °F (36.6 °C) (Bladder)   Resp 18   Ht 5' 9\" (1.753 m)   Wt 192 lb (87.1 kg)   SpO2 100%   BMI 28.35 kg/m²   Hand Dominance: right     Eyes:  lids and lashes normal, pupils equal and round, extra ocular muscles intact, sclera clear, conjunctiva normal    Head/ENT:  Normocephalic, atraumatic, full upper and lower dentures, normal gums, & palate, oropharynx without erythema or exudates    Neck:  supple, symmetrical, trachea midline, no lymphadenopathy, no jugular venous distension, no carotid bruits and MASSES:  no masses    Lungs:  no increased work of breathing, good air exchange, no retractions and clear to auscultation, no palpable / percussible abnormalities    Cardiovascular:  regular rate and rhythm, S1, S2 normal, no murmur, click, rub or gallop    Pulses:     carotid brachial radial femoral popliteal DP PT   RIGHT 2+ 2+ TR band 2+ 2+ 2+ 2+   LEFT 2+ 2+ 2+ 2+ 2+ 2+ 2+     Abdomen:  Soft, normal bowel sounds, non-tender, no hepatosplenomegaly, aorta likely normal and bruits absent    Musculoskeletal:  Back is straight and non-tender,  No CVAT, full ROM of upper and lower extremities.     Extremities:   No clubbing, or cyanosis, or edema     Skin: warm and normal turgor, no ulcers, infections, or rashes, no rashes, no ecchymoses, no petechiae, no nodules, no jaundice    Neurological: awake, alert and oriented x 3, motor 5/5 bilateral upper and lower extremities, sensation grossly intact    Psychiatric: Mood and affect appear appropriate    LABS:  BMP:   Lab Results   Component Value Date     05/21/2021    K 4.5 05/21/2021     05/21/2021    CO2 23 05/21/2021    BUN 19 05/21/2021    CREATININE 0.8 05/21/2021      No components found for: GLUNo results found for: MG   CBC:   Lab Results   Component Value Date    WBC 6.8 05/21/2021    HGB 9.9 05/21/2021    HCT 31.4 05/21/2021    MCV 72.7 05/21/2021     05/21/2021      Coagulation:   Lab Results   Component Value Date    INR 1.03 11/28/2015    APTT 31.1 11/28/2015     Cardiac markers:   Lab Results   Component Value Date    TROPONINI <0.01 11/29/2015     HgbA1c:  Lab Results   Component Value Date    LABA1C 6.7 05/13/2021      Hepatic Profile:  No results found for: ALB    Lab Results   Component Value Date    BILITOT <0.2 05/13/2021    AST 14 05/13/2021    ALT 12 05/13/2021    ALKPHOS 61 05/13/2021      Cholesterol:  Lab Results   Component Value Date    CHOL 232 05/13/2021    HDL 52 05/13/2021    LDLCALC 163 05/13/2021    TRIG 85 05/13/2021      UA:  Lab Results   Component Value Date    NITRITE neg 08/12/2020    COLORU yellow 08/12/2020    COLORU YELLOW 11/29/2015    PHUR 5.5 08/12/2020    PHUR 6.0 11/29/2015    WBCUA 26 11/29/2015    RBCUA 1 11/29/2015    CLARITYU clear 08/12/2020    CLARITYU Clear 11/29/2015    SPECGRAV 1.020 08/12/2020    SPECGRAV >1.030 11/29/2015    LEUKOCYTESUR small 08/12/2020    LEUKOCYTESUR Negative 11/29/2015    UROBILINOGEN 0.2 11/29/2015    BILIRUBINUR neg 08/12/2020    BLOODU neg 08/12/2020 BLOODU Negative 2015    GLUCOSEU neg 2020    GLUCOSEU Negative 2015       TESTING/IMAGING  EK2021  NSR    ANGIOGRAM: 2021  Artery Findings/Result   LM Normal   LAD 90% prox, 80% mid involving D1 ostial, 99% mid with ANTON 2 flow distal LAD   Cx 90% OM1, OM1 very ectatic   RI N/A   RCA 99% mid   LVEDP 10   LVG 55%, basal inferior hk      CTA ABDOMEN/PELVIS: 2019  There is an infrarenal fusiform abdominal aortic aneurysm measuring 4.3 cm in greatest dimension.  Recommend follow up based on AAA criteria as below. Multifocal atherosclerotic disease seen throughout the abdominal aorta and iliac branches as well as proximal outflow arteries within the thighs.  No significant 50% or greater stenosis seen within these branches however. Small non flow limiting dissection flap noted within the left common iliac artery, as well as the distal right common iliac artery. Other incidental findings as above, with no acute process seen in the abdomenor pelvis. RECOMMENDATIONS:  4.3 cm abdominal aortic aneurysm. Recommend follow-up every 12 months and vascular consultation.     CXRAY:  ordered    CAROTIDS: ordered     PFTS: ordered     [id number] Score for Atrial Fibrillation Stroke Risk   Risk   Factors  Component Value   C CHF No 0   H HTN Yes 1   A2 Age >= 76 No,  (62 y.o.) 0   D DM Yes 1   S2 Prior Stroke/TIA No 0   V Vascular Disease No 0   A Age 74-69 No,  (62 y.o.) 0   Sc Sex male 0    [id number]  Score  2   Score last updated 21 9:12 PM EDT    Click here for a link to the UpToDate guideline \"Atrial Fibrillation: Anticoagulation therapy to prevent embolization    Disclaimer: Risk Score calculation is dependent on accuracy of patient problem list and past encounter diagnosis.       CTS Adult Cardiac Risk: CABG: Pending PFTs, carotids, echo  Mortality Risk: 0.592%  Renal Failure: 1.000%  Permanent Stroke: 0.919%  Prolonged Ventilation: 4.387%  DSW Infection: 0.197%  Reoperation: 1.784%  Morbidity and Mortality: 7.431%  Short Length of Stay: 47.050%  Long Length of Stay: 3.745%      ASSESSMENT AND PLAN:    The patient was counseled at length about the risks of reza Covid-19 during their perioperative period and any recovery window from their procedure. The patient was made aware that reza Covid-19  may worsen their prognosis for recovering from their procedure  and lend to a higher morbidity and/or mortality risk. All material risks, benefits, and reasonable alternatives including postponing the procedure were discussed. The patient does wish to proceed with the procedure at this time. 58-year-old male with diabetes, diverticulosis, hypertension, hyperlipidemia, abdominal aortic aneurysm, elevated PSA who presents with worsening shortness of breath and exertional chest pain and bilateral arm pain. His work-up included cardiac cath that showed severe three-vessel coronary artery disease. Treatment options including medical therapy versus doing nothing versus multiple PCI versus surgical revascularization with CABG were discussed in extent with the patient. The conduct of CABG surgery including sternotomy and use of cardiopulmonary bypass were explained to the patient. Patient stated that at this point he is not ready to make a decision whether he wants surgery or not and he would like to discuss this with his wife and daughter. Nevertheless we will proceed with standard preoperative work-up and discussed indication over the weekend. Should the patient accept to have surgery will determine the exact timing at some point early next week depending on the availability of surgeons/cardiac anesthesiologist/surgical teams. I explained to him that there is a chance that he surgery might need to be performed by my partner Dr. Deep Lares and he is amenable to this. Thank you Dr. Sofiya Bermudez  for the consultation.        Electronically signed by Rosa Shah CHARLOTTE ANDERSON - CNP on 5/21/2021 at 1:23 PM      Attending Supervising [de-identified] Attestation Statement  The patient met the criteria for indirect supervision. I discussed the findings and plans with the nurse practitioner and agree as documented in her note .     Electronically signed by Bhavya Juan MD on 5/21/21 at 6:53 PM EDT

## 2021-05-21 NOTE — OP NOTE
[facility]  Procedure Note    Procedure: [facility]  Indication: UA    Procedure Details:  Consent Access Bleed R Sedat Start Stop Versed Fentanyl Contrast Fluoro EBL Comp Spec   Yes RRA Low MCSFC 1240 1313 5 100 58 4.2 <20 None None   *Ultrasound Note: Ultrasound guidance used to determine aforementioned artery patency, size (>2mm), anatomic variations and ideal puncture location. Real-time ultrasound utilized concurrent with vascular needle entry into the artery. Image(s) permanently recorded and reported in the patient chart.   *Sedation Note: MCSFC = minimal conscious sedation for comfort    Findings:  Artery Findings/Result   LM Normal   LAD 90% prox, 80% mid involving D1 ostial, 99% mid with ANTON 2 flow distal LAD   Cx 90% OM1, OM1 very ectatic   RI N/A   RCA 99% mid   LVEDP 10   LVG 55%, basal inferior hk     Intervention:   None    Post Cath Dx:   CABG referral inpatient

## 2021-05-22 LAB
ANION GAP SERPL CALCULATED.3IONS-SCNC: 10 MMOL/L (ref 3–16)
APTT: 31.5 SEC (ref 24.2–36.2)
BACTERIA: ABNORMAL /HPF
BASE EXCESS ARTERIAL: 1 MMOL/L (ref -3–3)
BILIRUBIN URINE: NEGATIVE
BLOOD, URINE: NEGATIVE
BUN BLDV-MCNC: 14 MG/DL (ref 7–20)
CALCIUM SERPL-MCNC: 9.2 MG/DL (ref 8.3–10.6)
CARBOXYHEMOGLOBIN ARTERIAL: 0.8 % (ref 0–1.5)
CHLORIDE BLD-SCNC: 104 MMOL/L (ref 99–110)
CHOLESTEROL, TOTAL: 177 MG/DL (ref 0–199)
CLARITY: CLEAR
CO2: 23 MMOL/L (ref 21–32)
COLOR: YELLOW
CREAT SERPL-MCNC: 0.8 MG/DL (ref 0.8–1.3)
EPITHELIAL CELLS, UA: 0 /HPF (ref 0–5)
GFR AFRICAN AMERICAN: >60
GFR NON-AFRICAN AMERICAN: >60
GLUCOSE BLD-MCNC: 108 MG/DL (ref 70–99)
GLUCOSE URINE: NEGATIVE MG/DL
HCO3 ARTERIAL: 25.2 MMOL/L (ref 21–29)
HCT VFR BLD CALC: 31.9 % (ref 40.5–52.5)
HDLC SERPL-MCNC: 42 MG/DL (ref 40–60)
HEMOGLOBIN, ART, EXTENDED: 10.4 G/DL (ref 13.5–17.5)
HEMOGLOBIN: 10.1 G/DL (ref 13.5–17.5)
HYALINE CASTS: 0 /LPF (ref 0–8)
INR BLD: 1.08 (ref 0.86–1.14)
IRON SATURATION: 7 % (ref 20–50)
IRON: 24 UG/DL (ref 59–158)
KETONES, URINE: NEGATIVE MG/DL
LDL CHOLESTEROL CALCULATED: 113 MG/DL
LEUKOCYTE ESTERASE, URINE: ABNORMAL
LV EF: 60 %
LVEF MODALITY: NORMAL
MCH RBC QN AUTO: 22.8 PG (ref 26–34)
MCHC RBC AUTO-ENTMCNC: 31.8 G/DL (ref 31–36)
MCV RBC AUTO: 71.7 FL (ref 80–100)
METHEMOGLOBIN ARTERIAL: 0.4 %
MICROSCOPIC EXAMINATION: YES
NITRITE, URINE: NEGATIVE
O2 CONTENT ARTERIAL: 14 ML/DL
O2 SAT, ARTERIAL: 97.6 %
O2 THERAPY: ABNORMAL
PCO2 ARTERIAL: 37.6 MMHG (ref 35–45)
PDW BLD-RTO: 18.1 % (ref 12.4–15.4)
PH ARTERIAL: 7.43 (ref 7.35–7.45)
PH UA: 7 (ref 5–8)
PLATELET # BLD: 199 K/UL (ref 135–450)
PMV BLD AUTO: 8.9 FL (ref 5–10.5)
PO2 ARTERIAL: 95.3 MMHG (ref 75–108)
POTASSIUM SERPL-SCNC: 4.6 MMOL/L (ref 3.5–5.1)
PROTEIN UA: NEGATIVE MG/DL
PROTHROMBIN TIME: 12.5 SEC (ref 10–13.2)
RBC # BLD: 4.45 M/UL (ref 4.2–5.9)
RBC UA: ABNORMAL /HPF (ref 0–4)
SODIUM BLD-SCNC: 137 MMOL/L (ref 136–145)
SPECIFIC GRAVITY UA: 1.01 (ref 1–1.03)
TCO2 ARTERIAL: 59 MMOL/L
TOTAL IRON BINDING CAPACITY: 349 UG/DL (ref 260–445)
TRIGL SERPL-MCNC: 109 MG/DL (ref 0–150)
URINE REFLEX TO CULTURE: ABNORMAL
URINE TYPE: ABNORMAL
UROBILINOGEN, URINE: 0.2 E.U./DL
VLDLC SERPL CALC-MCNC: 22 MG/DL
WBC # BLD: 5.8 K/UL (ref 4–11)
WBC UA: 6 /HPF (ref 0–5)

## 2021-05-22 PROCEDURE — 6370000000 HC RX 637 (ALT 250 FOR IP): Performed by: NURSE PRACTITIONER

## 2021-05-22 PROCEDURE — 2100000000 HC CCU R&B

## 2021-05-22 PROCEDURE — 36600 WITHDRAWAL OF ARTERIAL BLOOD: CPT

## 2021-05-22 PROCEDURE — 85610 PROTHROMBIN TIME: CPT

## 2021-05-22 PROCEDURE — 83550 IRON BINDING TEST: CPT

## 2021-05-22 PROCEDURE — 6370000000 HC RX 637 (ALT 250 FOR IP): Performed by: INTERNAL MEDICINE

## 2021-05-22 PROCEDURE — 80048 BASIC METABOLIC PNL TOTAL CA: CPT

## 2021-05-22 PROCEDURE — 93306 TTE W/DOPPLER COMPLETE: CPT

## 2021-05-22 PROCEDURE — 2580000003 HC RX 258: Performed by: INTERNAL MEDICINE

## 2021-05-22 PROCEDURE — 83540 ASSAY OF IRON: CPT

## 2021-05-22 PROCEDURE — 80061 LIPID PANEL: CPT

## 2021-05-22 PROCEDURE — 85027 COMPLETE CBC AUTOMATED: CPT

## 2021-05-22 PROCEDURE — 82803 BLOOD GASES ANY COMBINATION: CPT

## 2021-05-22 PROCEDURE — 2000000000 HC ICU R&B

## 2021-05-22 PROCEDURE — 99233 SBSQ HOSP IP/OBS HIGH 50: CPT | Performed by: INTERNAL MEDICINE

## 2021-05-22 PROCEDURE — 85730 THROMBOPLASTIN TIME PARTIAL: CPT

## 2021-05-22 PROCEDURE — 36415 COLL VENOUS BLD VENIPUNCTURE: CPT

## 2021-05-22 PROCEDURE — 81001 URINALYSIS AUTO W/SCOPE: CPT

## 2021-05-22 PROCEDURE — 94760 N-INVAS EAR/PLS OXIMETRY 1: CPT

## 2021-05-22 RX ORDER — TRAMADOL HYDROCHLORIDE 50 MG/1
50 TABLET ORAL ONCE
Status: COMPLETED | OUTPATIENT
Start: 2021-05-22 | End: 2021-05-22

## 2021-05-22 RX ORDER — ROSUVASTATIN CALCIUM 20 MG/1
20 TABLET, COATED ORAL NIGHTLY
Status: DISCONTINUED | OUTPATIENT
Start: 2021-05-22 | End: 2021-05-25

## 2021-05-22 RX ORDER — TRAMADOL HYDROCHLORIDE 50 MG/1
50 TABLET ORAL EVERY 6 HOURS PRN
Status: DISCONTINUED | OUTPATIENT
Start: 2021-05-22 | End: 2021-06-02 | Stop reason: HOSPADM

## 2021-05-22 RX ADMIN — METOPROLOL SUCCINATE 25 MG: 25 TABLET, EXTENDED RELEASE ORAL at 08:26

## 2021-05-22 RX ADMIN — TRAMADOL HYDROCHLORIDE 50 MG: 50 TABLET, FILM COATED ORAL at 14:34

## 2021-05-22 RX ADMIN — TRAMADOL HYDROCHLORIDE 50 MG: 50 TABLET, FILM COATED ORAL at 20:24

## 2021-05-22 RX ADMIN — TRAMADOL HYDROCHLORIDE 50 MG: 50 TABLET, FILM COATED ORAL at 08:26

## 2021-05-22 RX ADMIN — TAMSULOSIN HYDROCHLORIDE 0.4 MG: 0.4 CAPSULE ORAL at 20:24

## 2021-05-22 RX ADMIN — Medication 10 ML: at 08:26

## 2021-05-22 RX ADMIN — AMLODIPINE BESYLATE 5 MG: 5 TABLET ORAL at 08:26

## 2021-05-22 RX ADMIN — ROSUVASTATIN 20 MG: 20 TABLET, FILM COATED ORAL at 20:24

## 2021-05-22 RX ADMIN — ASPIRIN 81 MG: 81 TABLET, CHEWABLE ORAL at 08:26

## 2021-05-22 RX ADMIN — LISINOPRIL 40 MG: 20 TABLET ORAL at 08:26

## 2021-05-22 RX ADMIN — Medication 10 ML: at 20:25

## 2021-05-22 ASSESSMENT — PAIN DESCRIPTION - ONSET
ONSET: ON-GOING

## 2021-05-22 ASSESSMENT — PAIN DESCRIPTION - PROGRESSION
CLINICAL_PROGRESSION: GRADUALLY WORSENING
CLINICAL_PROGRESSION: GRADUALLY IMPROVING
CLINICAL_PROGRESSION: GRADUALLY WORSENING

## 2021-05-22 ASSESSMENT — PAIN DESCRIPTION - LOCATION
LOCATION: LEG

## 2021-05-22 ASSESSMENT — PAIN SCALES - GENERAL
PAINLEVEL_OUTOF10: 0
PAINLEVEL_OUTOF10: 4
PAINLEVEL_OUTOF10: 4
PAINLEVEL_OUTOF10: 0
PAINLEVEL_OUTOF10: 10
PAINLEVEL_OUTOF10: 9
PAINLEVEL_OUTOF10: 8

## 2021-05-22 ASSESSMENT — PAIN DESCRIPTION - PAIN TYPE
TYPE: CHRONIC PAIN

## 2021-05-22 ASSESSMENT — PAIN DESCRIPTION - DESCRIPTORS
DESCRIPTORS: ACHING;NUMBNESS

## 2021-05-22 ASSESSMENT — PAIN DESCRIPTION - ORIENTATION
ORIENTATION: LEFT;RIGHT
ORIENTATION: RIGHT;LEFT

## 2021-05-22 ASSESSMENT — PAIN DESCRIPTION - FREQUENCY
FREQUENCY: CONTINUOUS

## 2021-05-22 NOTE — FLOWSHEET NOTE
Instructed patient and patient's wife on OHS protocols and what to expect. Reviewed heart pillow, IS and OHS binder. Instructed them to call with any questions. Verbalized understanding. Instructed patient on need for ABGs. Patient stated we could try again. Called RT to ask if she would do. RT states she will come down and do it. Pt admitted to room. Oriented to room, call light and bed mechanics. Side rails up x2. Call light within reach. Orders reviewed.

## 2021-05-23 PROCEDURE — 2580000003 HC RX 258: Performed by: NURSE PRACTITIONER

## 2021-05-23 PROCEDURE — 2000000000 HC ICU R&B

## 2021-05-23 PROCEDURE — 6360000002 HC RX W HCPCS: Performed by: NURSE PRACTITIONER

## 2021-05-23 PROCEDURE — 6370000000 HC RX 637 (ALT 250 FOR IP): Performed by: INTERNAL MEDICINE

## 2021-05-23 PROCEDURE — 99233 SBSQ HOSP IP/OBS HIGH 50: CPT | Performed by: NURSE PRACTITIONER

## 2021-05-23 PROCEDURE — 94760 N-INVAS EAR/PLS OXIMETRY 1: CPT

## 2021-05-23 PROCEDURE — 2580000003 HC RX 258: Performed by: INTERNAL MEDICINE

## 2021-05-23 RX ADMIN — TRAMADOL HYDROCHLORIDE 50 MG: 50 TABLET, FILM COATED ORAL at 23:33

## 2021-05-23 RX ADMIN — IRON SUCROSE 200 MG: 20 INJECTION, SOLUTION INTRAVENOUS at 13:53

## 2021-05-23 RX ADMIN — Medication 10 ML: at 08:59

## 2021-05-23 RX ADMIN — TRAMADOL HYDROCHLORIDE 50 MG: 50 TABLET, FILM COATED ORAL at 17:14

## 2021-05-23 RX ADMIN — ROSUVASTATIN 20 MG: 20 TABLET, FILM COATED ORAL at 20:10

## 2021-05-23 RX ADMIN — TRAMADOL HYDROCHLORIDE 50 MG: 50 TABLET, FILM COATED ORAL at 10:54

## 2021-05-23 RX ADMIN — ASPIRIN 81 MG: 81 TABLET, CHEWABLE ORAL at 08:55

## 2021-05-23 RX ADMIN — METOPROLOL SUCCINATE 25 MG: 25 TABLET, EXTENDED RELEASE ORAL at 08:55

## 2021-05-23 RX ADMIN — TAMSULOSIN HYDROCHLORIDE 0.4 MG: 0.4 CAPSULE ORAL at 20:10

## 2021-05-23 RX ADMIN — LISINOPRIL 40 MG: 20 TABLET ORAL at 08:55

## 2021-05-23 RX ADMIN — AMLODIPINE BESYLATE 5 MG: 5 TABLET ORAL at 08:56

## 2021-05-23 ASSESSMENT — PAIN SCALES - GENERAL
PAINLEVEL_OUTOF10: 0
PAINLEVEL_OUTOF10: 2
PAINLEVEL_OUTOF10: 0
PAINLEVEL_OUTOF10: 9
PAINLEVEL_OUTOF10: 9

## 2021-05-23 ASSESSMENT — PAIN DESCRIPTION - PROGRESSION: CLINICAL_PROGRESSION: GRADUALLY WORSENING

## 2021-05-23 NOTE — PROGRESS NOTES
[facility]   Cardiology Progress Note     Date: 5/23/2021  Admit Date: 5/21/2021     Chief Complaint: No chief complaint on file. History of Present Illness: History obtained from patient and medical record. Richard Barron is a 59 y.o. male with a past medical history of AAA, hypertension, hyperlipidemia. Presented for outpatient left heart cath due to complaints of typical chest pain. Interval Hx: Today, he is doing well today. No complaints of chest pain or shortness of breath. Walking in room and using incentive spirometer. Undergoing CABG evaluation. Patient seen and examined. Clinical notes reviewed. Telemetry reviewed. No new complaints today. No major events overnight. Denies having chest pain, palpitations, shortness of breath, orthopnea/PND, cough, or dizziness at the time of this visit. Past Medical History:  Past Medical History:   Diagnosis Date    AAA (abdominal aortic aneurysm) ([address] 11/28/2015    4.3 cm 9/2019. yearly CT    Aneurysm of descending thoracic aorta ([address] 03/28/2018    2018: 3 cm    Arthritis     Atherosclerosis of aorta ([address] 03/28/2018    extensive per CT 2018    Back pain     Diabetes mellitus ([address]     Diverticulosis     Elevated PSA 10/19/2017    Hyperlipidemia     Hypertension     Polyneuropathy associated with underlying disease ([address] 10/19/2017        Past Surgical History:    has a past surgical history that includes Upper gastrointestinal endoscopy (11/30/15); Urethra dilation (5/2017, 7/2017); Cardiac catheterization; and Upper gastrointestinal endoscopy (N/A, 10/12/2018). Social History:  Reviewed. reports that he quit smoking about 11 years ago. His smoking use included cigarettes. He has a 20.00 pack-year smoking history. He has never used smokeless tobacco. He reports that he does not drink alcohol and does not use drugs. Allergies:  No Known Allergies    Family History:  Reviewed. family history is not on file.  Denies family sodium chloride flush, sodium chloride, acetaminophen, perflutren lipid microspheres     Review of Systems:  · Constitutional: Negative for fever, night sweats, chills,  · Skin: Negative for rash, pruritus, bleeding, or bruising    · HEENT: Negative for vision changes, ringing in the ears, dysphagia  · Respiratory: Reviewed in HPI  · Cardiovascular: Reviewed in HPI  · Gastrointestinal: Negative for abdominal pain, N/V/D, constipation, or black/tarry stools  · Genito-Urinary: Negative for dysuria, incontinence, or hematuria  · Musculoskeletal: Negative for joint swelling, muscle pain, or injuries  · Neurological/Psych: Negative for confusion, seizures, headaches, or TIA-like symptoms. No anxiety or depression. Physical Examination:  Vitals:    05/23/21 1141   BP: 136/65   Pulse: 84   Resp: 14   Temp: 96.3 °F (35.7 °C)   SpO2: 99%      In: 10 [I.V.:10]  Out: -    Wt Readings from Last 3 Encounters:   05/23/21 188 lb 7.9 oz (85.5 kg)   05/13/21 192 lb 1.6 oz (87.1 kg)   05/10/21 194 lb (88 kg)       Intake/Output Summary (Last 24 hours) at 5/23/2021 1239  Last data filed at 5/22/2021 2024  Gross per 24 hour   Intake 10 ml   Output --   Net 10 ml       Telemetry: Personally Reviewed  - Sinus rhythm   · Constitutional: Cooperative and in no apparent distress, and appears well nourished  · Skin: Warm and pink; no pallor, cyanosis, clubbing, or bruising   · HEENT: Symmetric and normocephalic. PERRL. Conjunctiva pink with clear sclera. Mucus membranes moist.   · Cardiovascular: Regular rate and rhythm. S1/S2 present without murmurs, no rubs or gallops. Peripheral pulses 2+, capillary refill < 3 seconds. No elevation of JVP. No peripheral edema  · Respiratory: Respirations symmetric and unlabored. Lungs clear to auscultation bilaterally, no wheezing, crackles, or rhonchi  · Gastrointestinal: Abdomen soft and round. Bowel sounds active without tenderness.   · Musculoskeletal: Bilateral upper and lower extremity strength  with full ROM  · Neurologic/Psych: Awake and orientated to person, place and time. Calm affect, appropriate mood    Pertinent labs, diagnostic, device, and imaging results reviewed as a part of this visit    Labs:    BMP:   Recent Labs     21  0952 219    137   K 4.5 4.6    104   CO2 23 23   BUN 19 14   CREATININE 0.8 0.8     Estimated Creatinine Clearance: 102 mL/min (based on SCr of 0.8 mg/dL).    CBC:   Recent Labs     21  0952 21   WBC 6.8 5.8   HGB 9.9* 10.1*   HCT 31.4* 31.9*   MCV 72.7* 71.7*    199     Thyroid:   Lab Results   Component Value Date    TSH 1.360 2019     Lipids:   Lab Results   Component Value Date    CHOL 177 2021    HDL 42 2021    TRIG 109 2021     LFTS:   Lab Results   Component Value Date    ALT 12 2021    AST 14 2021    ALKPHOS 61 2021    PROT 6.2 2021    AGRATIO 1.7 2021    BILITOT <0.2 2021     Cardiac Enzymes:   Lab Results   Component Value Date    TROPONINI <0.01 2015    TROPONINI <0.01 2015    TROPONINI <0.01 10/06/2013    TROPONINI <0.01 10/05/2013    TROPONINI <0.01 10/05/2013    TROPONINI 0.01 10/05/2013    TROPONINI 0.01 10/05/2013     Coags:   Lab Results   Component Value Date    PROTIME 12.5 2021    INR 1.08 2021     EC21  Normal sinus rhythm  Minimal voltage criteria for LVH, may be normal variant  Nonspecific ST abnormality  Abnormal ECG    ECHO: Pending    Cath: 21  Findings:  Artery Findings/Result   LM Normal   LAD 90% prox, 80% mid involving D1 ostial, 99% mid with ANTON 2 flow distal LAD   Cx 90% OM1, OM1 very ectatic   RI N/A   RCA 99% mid   LVEDP 10   LVG 55%, basal inferior hk      Intervention:         None     Post Cath Dx:       CABG referral inpatient    Problem List:   Patient Active Problem List    Diagnosis Date Noted    Unstable angina [facility] 2021    Persistent atrial fibrillation ([facility] 2021    A[facility] ([facility] 03/28/2018    Atherosclerosis of aorta ([facility] 03/28/2018    Polyneuropathy associated with underlying disease ([facility] 10/19/2017    Diverticulosis     Former smoker 07/14/2017    Medical non-compliance 07/14/2017    Type 2 diabetes mellitus with diabetic polyneuropathy, without long-term current use of insulin ([facility]     Essential hypertension     Hypercholesteremia     AAA (abdominal aortic aneurysm) ([facility] 11/28/2015    Chest pain 10/05/2013        Assessment and Plan:     Coronary artery disease  [facility] 5/21 revealing severe multivessel disease  ~CTS consulted for CABG evaluation. Undergoing preop testing.  ~Plan for CABG this week  ~Continue aspirin, Toprol, Crestor    Hypertension  ~Stable  ~On Norvasc 5, lisinopril 40, Toprol 25    Hyperlipidemia  ~Continue Crestor    Chronic leg pain  ~On as needed tramadol    Anemia  ~consistent with NOAM   ~Will give Venofer IV x3 doses     Multiple medical conditions with risk of decompensation. All pertinent information and plan of care discussed with the physician. All questions and concerns were addressed to the patient. Alternatives to my treatment were discussed. I have discussed the above stated plan with patient and the nurse. The patient verbalized understanding and agreed with the plan. Thank you for allowing to us to participate in the care of Sonny Gonzalez. Total visit time > 35 minutes; > 50% spend counseling / coordinating care. I reviewed interval history, physical exam, review of data including labs, imaging, development and implementation of treatment plan and coordination of complex care. Counseled on risk factor modifications. Hanna PORTER-CNP  [facility]   Office: [phone]    NOTE:  This report was transcribed using voice recognition software. Every effort was made to ensure accuracy; however, inadvertent computerized transcription errors may be present.

## 2021-05-23 NOTE — PROGRESS NOTES
Morning assessment complete. Patient alert and oriented, no complaints of pain. Blood pressure elevated, see flow sheet, morning medications given, see MAR. Will continue to monitor.

## 2021-05-23 NOTE — PLAN OF CARE
Problem: Falls - Risk of:  Goal: Will remain free from falls  Description: Will remain free from falls  Outcome: Ongoing  Note: Pt has remained free from any falls so far this shift. Pt independent with needs. Refuses bed alarm. Non-skid socks on. Bed in lowest and locked position. Belongings within reach. Will continue to monitor. Call light within reach. Problem: Pain:  Description: Pain management should include both nonpharmacologic and pharmacologic interventions. Goal: Pain level will decrease  Description: Pain level will decrease  Outcome: Ongoing  Note: Pt has chronic bilateral leg pain. PRN pain medications. Pt educated on non-pharmaceutical pain interventions. Will continue to assess pain level. Call light within reach.

## 2021-05-24 ENCOUNTER — APPOINTMENT (OUTPATIENT)
Dept: CT IMAGING | Age: 61
DRG: 234 | End: 2021-05-24
Attending: INTERNAL MEDICINE
Payer: COMMERCIAL

## 2021-05-24 LAB
ABO/RH: NORMAL
ANTIBODY SCREEN: NORMAL
BACTERIA: ABNORMAL /HPF
BILIRUBIN URINE: NEGATIVE
BLOOD, URINE: NEGATIVE
CLARITY: ABNORMAL
COLOR: YELLOW
EPITHELIAL CELLS, UA: 1 /HPF (ref 0–5)
GLUCOSE BLD-MCNC: 119 MG/DL (ref 70–99)
GLUCOSE URINE: 100 MG/DL
HYALINE CASTS: 19 /LPF (ref 0–8)
KETONES, URINE: NEGATIVE MG/DL
LEUKOCYTE ESTERASE, URINE: ABNORMAL
MICROSCOPIC EXAMINATION: YES
NITRITE, URINE: POSITIVE
PERFORMED ON: ABNORMAL
PH UA: 6 (ref 5–8)
PROTEIN UA: ABNORMAL MG/DL
RBC UA: 1 /HPF (ref 0–4)
SPECIFIC GRAVITY UA: 1.03 (ref 1–1.03)
URINE REFLEX TO CULTURE: YES
URINE TYPE: ABNORMAL
UROBILINOGEN, URINE: 0.2 E.U./DL
WBC UA: 75 /HPF (ref 0–5)

## 2021-05-24 PROCEDURE — 71275 CT ANGIOGRAPHY CHEST: CPT

## 2021-05-24 PROCEDURE — APPNB30 APP NON BILLABLE TIME 0-30 MINS: Performed by: NURSE PRACTITIONER

## 2021-05-24 PROCEDURE — 81001 URINALYSIS AUTO W/SCOPE: CPT

## 2021-05-24 PROCEDURE — 94640 AIRWAY INHALATION TREATMENT: CPT

## 2021-05-24 PROCEDURE — 2580000003 HC RX 258: Performed by: NURSE PRACTITIONER

## 2021-05-24 PROCEDURE — APPSS15 APP SPLIT SHARED TIME 0-15 MINUTES: Performed by: NURSE PRACTITIONER

## 2021-05-24 PROCEDURE — 6360000004 HC RX CONTRAST MEDICATION: Performed by: THORACIC SURGERY (CARDIOTHORACIC VASCULAR SURGERY)

## 2021-05-24 PROCEDURE — 86920 COMPATIBILITY TEST SPIN: CPT

## 2021-05-24 PROCEDURE — 86900 BLOOD TYPING SEROLOGIC ABO: CPT

## 2021-05-24 PROCEDURE — 6370000000 HC RX 637 (ALT 250 FOR IP): Performed by: NURSE PRACTITIONER

## 2021-05-24 PROCEDURE — 99232 SBSQ HOSP IP/OBS MODERATE 35: CPT | Performed by: INTERNAL MEDICINE

## 2021-05-24 PROCEDURE — 6360000002 HC RX W HCPCS: Performed by: NURSE PRACTITIONER

## 2021-05-24 PROCEDURE — 86850 RBC ANTIBODY SCREEN: CPT

## 2021-05-24 PROCEDURE — 2000000000 HC ICU R&B

## 2021-05-24 PROCEDURE — 87086 URINE CULTURE/COLONY COUNT: CPT

## 2021-05-24 PROCEDURE — 2580000003 HC RX 258: Performed by: INTERNAL MEDICINE

## 2021-05-24 PROCEDURE — 87186 SC STD MICRODIL/AGAR DIL: CPT

## 2021-05-24 PROCEDURE — 6370000000 HC RX 637 (ALT 250 FOR IP): Performed by: INTERNAL MEDICINE

## 2021-05-24 PROCEDURE — 86901 BLOOD TYPING SEROLOGIC RH(D): CPT

## 2021-05-24 PROCEDURE — P9016 RBC LEUKOCYTES REDUCED: HCPCS

## 2021-05-24 PROCEDURE — 87077 CULTURE AEROBIC IDENTIFY: CPT

## 2021-05-24 RX ORDER — GABAPENTIN 300 MG/1
600 CAPSULE ORAL ONCE
Status: COMPLETED | OUTPATIENT
Start: 2021-05-25 | End: 2021-05-25

## 2021-05-24 RX ORDER — BUDESONIDE AND FORMOTEROL FUMARATE DIHYDRATE 80; 4.5 UG/1; UG/1
2 AEROSOL RESPIRATORY (INHALATION) 2 TIMES DAILY
Status: DISCONTINUED | OUTPATIENT
Start: 2021-05-24 | End: 2021-06-02 | Stop reason: HOSPADM

## 2021-05-24 RX ORDER — LIDOCAINE HYDROCHLORIDE 20 MG/ML
JELLY TOPICAL ONCE
Status: DISCONTINUED | OUTPATIENT
Start: 2021-05-24 | End: 2021-05-25

## 2021-05-24 RX ORDER — ALPRAZOLAM 0.25 MG/1
0.25 TABLET ORAL
Status: COMPLETED | OUTPATIENT
Start: 2021-05-24 | End: 2021-05-24

## 2021-05-24 RX ORDER — CHLORHEXIDINE GLUCONATE 4 G/100ML
SOLUTION TOPICAL SEE ADMIN INSTRUCTIONS
Status: DISCONTINUED | OUTPATIENT
Start: 2021-05-24 | End: 2021-05-25 | Stop reason: HOSPADM

## 2021-05-24 RX ORDER — PANTOPRAZOLE SODIUM 40 MG/10ML
40 INJECTION, POWDER, LYOPHILIZED, FOR SOLUTION INTRAVENOUS
Status: COMPLETED | OUTPATIENT
Start: 2021-05-25 | End: 2021-05-25

## 2021-05-24 RX ORDER — SODIUM CHLORIDE, SODIUM LACTATE, POTASSIUM CHLORIDE, CALCIUM CHLORIDE 600; 310; 30; 20 MG/100ML; MG/100ML; MG/100ML; MG/100ML
INJECTION, SOLUTION INTRAVENOUS CONTINUOUS
Status: DISCONTINUED | OUTPATIENT
Start: 2021-05-25 | End: 2021-05-25

## 2021-05-24 RX ORDER — LEVOFLOXACIN 5 MG/ML
750 INJECTION, SOLUTION INTRAVENOUS EVERY 24 HOURS
Status: DISCONTINUED | OUTPATIENT
Start: 2021-05-24 | End: 2021-05-30

## 2021-05-24 RX ORDER — SODIUM CHLORIDE 9 MG/ML
INJECTION, SOLUTION INTRAVENOUS
Status: DISPENSED
Start: 2021-05-24 | End: 2021-05-25

## 2021-05-24 RX ADMIN — ALPRAZOLAM 0.25 MG: 0.25 TABLET ORAL at 22:30

## 2021-05-24 RX ADMIN — METOPROLOL SUCCINATE 25 MG: 25 TABLET, EXTENDED RELEASE ORAL at 08:58

## 2021-05-24 RX ADMIN — ROSUVASTATIN 20 MG: 20 TABLET, FILM COATED ORAL at 22:30

## 2021-05-24 RX ADMIN — TRAMADOL HYDROCHLORIDE 50 MG: 50 TABLET, FILM COATED ORAL at 22:30

## 2021-05-24 RX ADMIN — IOPAMIDOL 75 ML: 755 INJECTION, SOLUTION INTRAVENOUS at 20:29

## 2021-05-24 RX ADMIN — TRAMADOL HYDROCHLORIDE 50 MG: 50 TABLET, FILM COATED ORAL at 09:00

## 2021-05-24 RX ADMIN — Medication 10 ML: at 08:58

## 2021-05-24 RX ADMIN — IRON SUCROSE 200 MG: 20 INJECTION, SOLUTION INTRAVENOUS at 15:01

## 2021-05-24 RX ADMIN — ASPIRIN 81 MG: 81 TABLET, CHEWABLE ORAL at 08:58

## 2021-05-24 RX ADMIN — LEVOFLOXACIN 750 MG: 5 INJECTION, SOLUTION INTRAVENOUS at 20:58

## 2021-05-24 RX ADMIN — AMLODIPINE BESYLATE 5 MG: 5 TABLET ORAL at 08:58

## 2021-05-24 RX ADMIN — LISINOPRIL 40 MG: 20 TABLET ORAL at 08:58

## 2021-05-24 RX ADMIN — MUPIROCIN: 20 OINTMENT TOPICAL at 22:30

## 2021-05-24 RX ADMIN — TAMSULOSIN HYDROCHLORIDE 0.4 MG: 0.4 CAPSULE ORAL at 22:30

## 2021-05-24 RX ADMIN — Medication 2 PUFF: at 20:56

## 2021-05-24 ASSESSMENT — PAIN SCALES - GENERAL
PAINLEVEL_OUTOF10: 9
PAINLEVEL_OUTOF10: 4
PAINLEVEL_OUTOF10: 0
PAINLEVEL_OUTOF10: 9
PAINLEVEL_OUTOF10: 0
PAINLEVEL_OUTOF10: 0

## 2021-05-24 ASSESSMENT — PAIN DESCRIPTION - PAIN TYPE: TYPE: CHRONIC PAIN

## 2021-05-24 ASSESSMENT — PAIN DESCRIPTION - ONSET: ONSET: ON-GOING

## 2021-05-24 ASSESSMENT — PAIN DESCRIPTION - FREQUENCY: FREQUENCY: CONTINUOUS

## 2021-05-24 ASSESSMENT — PAIN DESCRIPTION - ORIENTATION: ORIENTATION: RIGHT;LEFT

## 2021-05-24 ASSESSMENT — PAIN DESCRIPTION - DESCRIPTORS: DESCRIPTORS: ACHING;DISCOMFORT

## 2021-05-24 ASSESSMENT — PAIN DESCRIPTION - PROGRESSION: CLINICAL_PROGRESSION: NOT CHANGED

## 2021-05-24 ASSESSMENT — PAIN DESCRIPTION - LOCATION: LOCATION: LEG

## 2021-05-24 NOTE — PROGRESS NOTES
[address]   Progress Note  Cardiology    CC: bilateral arm and chest pain  HPI:Pt admitted and found to have severe 3vcad for which he is now awaiting surgery. No angina. He was also found to have severe iron deficiency anemia and is receiving iron infusions. His very severe CAD precludes extensive GI evaluation prior to surgery. He denies abdominal pain. No recent melena. His last normal CBC in the chart is from 2017. I cannot find any previous GI testing. Medications/Labs all Reviewed    Recent Labs     05/22/21 0419   WBC 5.8   HGB 10.1*   HCT 31.9*   MCV 71.7*        Recent Labs     05/22/21 0419   CREATININE 0.8   BUN 14      K 4.6      CO2 23     Recent Labs     05/22/21 0419   INR 1.08   PROTIME 12.5        MEDICATIONS:    iron sucrose (VENOFER) iv piggyback 100 mL (Admin over 60 minutes)  200 mg Intravenous Q24H    rosuvastatin  20 mg Oral Nightly    amLODIPine  5 mg Oral Daily    aspirin  81 mg Oral Daily    lisinopril  40 mg Oral Daily    metoprolol succinate  25 mg Oral Daily    tamsulosin  0.4 mg Oral Daily    sodium chloride flush  5-40 mL Intravenous 2 times per day      sodium chloride         Physical Examination:    BP (!) 122/49   Pulse 70   Temp 97 °F (36.1 °C) (Temporal)   Resp 16   Ht 5' 9\" (1.753 m)   Wt 189 lb 9.5 oz (86 kg)   SpO2 95%   BMI 28.00 kg/m²     Respiratory:  · Resp Assessment: Normal respiratory effort  · Resp Auscultation: Clear to auscultation bilaterally   Cardiovascular:  · Auscultation: regular rate and rhythm, normal S1S2, no murmur, rub or gallop  · Palpation:  Nl PMI  · JVP:  normal  · Extremities: No Edema  Abdomen:  · Soft, non-tender  · Normal bowel sounds  Extremities:  ·  No Cyanosis or Clubbing  Neurological/Psychiatric:  · Oriented to time, place, and person  · Non-anxious  Skin Warm and dry    Assessment:    Active Problems:    Unstable angina (HCC) very severe 3vcad with ANTON 2 flow in the LAD.  For CABG in the am.    Iron def anemia - has been present for ~2years. He will need work up in the future. AAA 4.3 ff by Dr Nichol Johns. DM - controlled in the hospital. Last A1C is 6.7.      Plan; CABG in the am.           Jarad Engel MD, 5/24/2021 9:57 AM

## 2021-05-24 NOTE — PROCEDURES
Pulmonary Function Testing      Patient name:  Darci Paz     [facility] Unit #:   [id number]   Date of test: 5/21/2021  Date of interpretation:   5/24/2021    Mr. Draci Paz is a 59y.o. year-old non smoker. The spirometry data were acceptable and reproducible. Spirometry:  Flow volume loops were obstructed. The FEV-1/FVC ratio was decreased. The  Prebronchodilator FEV-1 was 0.99 liters (29% of predicted), which was severely decreased. The FVC was 1.42 liters (32% of predicted), which was decreased. Response to inhaled bronchodilators (albuterol) was not performed. Lung volumes:  Lung volumes were not tested by plethysmography. Diffusion capacity was not tested. Interpretation:  Severe obstructive airway disease.

## 2021-05-24 NOTE — PLAN OF CARE
Problem: Falls - Risk of:  Goal: Will remain free from falls  Description: Will remain free from falls  Outcome: Ongoing  Goal: Absence of physical injury  Description: Absence of physical injury  Outcome: Ongoing     Problem: Bleeding:  Goal: Will show no signs and symptoms of excessive bleeding  Description: Will show no signs and symptoms of excessive bleeding  Outcome: Ongoing     Problem: Pain:  Goal: Pain level will decrease  Description: Pain level will decrease  Outcome: Ongoing

## 2021-05-24 NOTE — PROGRESS NOTES
Cardiothoracic Surgery Progress Note    CC: 3 vessel CAD    Subjective:  Hemodynamically stable, RA, afebrile. Alert and oriented X 3. Denying any complaints of chest pain or shortness of breath. Vital Signs:   Vitals:    05/24/21 0749   BP: (!) 122/49   Pulse: 77   Resp: 17   Temp: 97.7 °F (36.5 °C)   SpO2: 95%      Physical Exam:   Cardiac:  NSR  Lungs: clear to auscultation  Abdomen:  NA X 4  Vascular:  pulses all palpable   Extremities: none  :  Voiding    Labs:   CBC:   Recent Labs     05/22/21  0419   WBC 5.8   HGB 10.1*   HCT 31.9*        BMP:   Recent Labs     05/22/21 0419   K 4.6   CREATININE 0.8   CALCIUM 9.2       Assessment/Plan:  As per CC:     Plan:   Pre-op testing reviewed, will re-check UA today  Will hold ACE d/t OR tomorrow   For CABG/SANDRO tomorrow - Dr. [place] Fair- time to be detemined  Discussed plan with patient and all questions answered    Addendum: 1159  + UTI, started on levaquin   Inhalers started     Electronically signed by CHARLOTTE Szymanski - CNP on 5/24/2021 at 11:58 AM     Attending Supervising Physicians Attestation Statement  The patient met the criteria for indirect supervision. I discussed the findings and plans with the nurse practitioner and agree as documented in her note .     Electronically signed by Marely Stinson MD on 5/24/21 at 5:43 PM EDT

## 2021-05-24 NOTE — CARE COORDINATION
Discharge Planning Assessment    Plan; CABG in the am.     discharge planner met with patientto discuss reason for admission, current living situation, and potential needs at the time of discharge    Demographics/Insurance verified Yes/yes    Current type of dwelling: private home    Patient from ECF/SW confirmed with: n/a    Living arrangements: lives with spouse, no stairs and bedroom on first floor    Level of function/Support: independent prior to admission    PCP: Dr. Denise Priest    Last Visit to PCP: 5/21    DME:none    Active with any community resources/agencies/skilled home care: no    Medication compliance issues: no    Financial issues that could impact healthcare: no        Tentative discharge plan:  Likely home with [facility], no preference       Discussed with patient and/or family that on the day of discharge home tentative time of discharge will be between 10 AM and noon.     Transportation at the time of discharge: spouse    Dionisioidalia FelixGloucester MSW, [address] [facility] Narciso Mccurdy

## 2021-05-24 NOTE — DISCHARGE SUMMARY
Discharge Summary    Patient:  Gonzalo Adame 1960 [id number]   Admission Date:  5/21/2021  9:34 AM  Discharge Date:  6/2/2021    Principle Diagnosis: [place], multivessel coronary artery diease    Secondary Diagnosis:    HTN  AAA  HLD  Elevated PSA  DM2  Diverticulitis     Angiogram: 5/21/2021  Artery Findings/Result   LM Normal   LAD 90% prox, 80% mid involving D1 ostial, 99% mid with ANTON 2 flow distal LAD   Cx 90% OM1, OM1 very ectatic   RI N/A   RCA 99% mid   LVEDP 10   LVG 55%, basal inferior hk      Procedure:  5/25/2021  URGENT CABG CORONARY ARTERY BYPASS GRAFTING X 4, CISNEROS GRAFT TO LAD X 1, SEQUENTIAL VEIN GRAFT TO OM1 AND RPDA, VEIN GRAFT TO DIAGONAL WITH PROXIMAL ANASTIMOSIS END TO SIDE TO VEIN GRAFT TO OM1 AND RPDA, LIGATION SANDRO WITH 50 MM ATRICLIP, EVH LEFT LEG, TOTAL CPB, REJI, DOPPLER BYPASS GRAFT FLOW VERIFICATION, INSERTION OF VENTRICULAR PACING WIRES, BILATERAL INTERCOSTAL NERVE BLOCK WITH EXPAREL AND MARCAINE  CYSTOSCOPY, URETHRAL DILATION, INSERTION OF URETHRAL CATHETER    History:  The patient is a 59 y.o. male with significant past medical history of DM2, diverticulosis, HTN, HLD, AAA (4.3cm 9/2019, follows with Dr. Belkis Clifton), elevated PSA (urethral dilation, on flomax)  who presents for an outpatient left heart angiogram on 5/21 with chest pain. Patient had his annual PCP visit where he was found to be hypertensive and complained of sharp chest and bilateral arm pain. PCP referred him to cardiology for further work-up. Angiogram found  severe multivessel coronary artery disease. CVTS was consulted for surgical evaluation for myocardial revascularization. Patient is a former smoker. Patient has never tested positive for Covid and has  been vaccinated for covid with the last dose more than 14 days ago    Hospital Course: The patient underwent CABG X 4 and SANDRO on 5/25/2021. The patient's post-operative course was uneventful.   Patient with urethral stricture that required urology to place arana catheter for surgery. He will need to follow-up with urology as an outpatient. Patient also has a 4.8cm AAA that will need follow-up with Dr. Delaney Thompson as an outpatient. Patient had post-op atrial fibrillation and was anticoagulated on coumadin and started on amiodarone. Patient instructed to get a CXR prior to his visit with Dr. Leonor Sousa. Pain was controlled with combination of oral and IV medications. Patient was able to ambulate without difficulty and tolerate a regular diet. The patient was discharged on  6/2/2021. Pathology:  No specimens    Disposition:  Home with home care    Condition:  good    Medications:  ACE: Lisinopril 2.5 mg daily   Aspirin: 81 mg daily    Betablocker:lopressor 37.5 mg BID  Statin:Creestor 20 mg nightly   Coumadin:2.5 mg daily     Discharge Medications:   Ali, [facility] Medication Instructions [facility]:[id number]    Printed on:06/02/21 8548   Medication Information                      acetaminophen (TYLENOL) 500 MG tablet  Take 2 tablets by mouth every 6 hours             amiodarone (CORDARONE) 200 MG tablet  Take 1 tablet by mouth daily for 21 days             aspirin 81 MG tablet  Take 81 mg by mouth daily             ferrous sulfate (IRON 325) 325 (65 Fe) MG tablet  Take 1 tablet by mouth daily (with breakfast)             lisinopril (PRINIVIL;ZESTRIL) 2.5 MG tablet  Take 1 tablet by mouth Daily with lunch Hold for SBP <100             metFORMIN (GLUCOPHAGE) 1000 MG tablet  Take 1 tablet by mouth 2 times daily (with meals)             metoprolol tartrate 37.5 MG TABS  Take 37.5 mg by mouth 2 times daily Hold for SBP <100. HR < 70             oxyCODONE (ROXICODONE) 5 MG immediate release tablet  Take 1 tablet by mouth every 4 hours as needed for Pain for up to 7 days.              pantoprazole (PROTONIX) 40 MG tablet  Take 1 tablet by mouth daily             rosuvastatin (CRESTOR) 20 MG tablet  Take 1 tablet by mouth nightly             sennosides-docusate sodium (SENOKOT-S) 8.6-50 MG

## 2021-05-24 NOTE — PROGRESS NOTES
Clinical Pharmacy Note     Lisinopril placed on hold by NP Cecilia Thompson. Order discontinued per protocol. Please assess and reorder when appropriate. Thank you for allowing us to participate in the care of this patient.      Govind Melo, PharmD  Clinical Pharmacist [id number]  5/24/2021

## 2021-05-25 ENCOUNTER — ANESTHESIA EVENT (OUTPATIENT)
Dept: OPERATING ROOM | Age: 61
DRG: 234 | End: 2021-05-25
Payer: COMMERCIAL

## 2021-05-25 ENCOUNTER — ANESTHESIA (OUTPATIENT)
Dept: OPERATING ROOM | Age: 61
DRG: 234 | End: 2021-05-25
Payer: COMMERCIAL

## 2021-05-25 ENCOUNTER — APPOINTMENT (OUTPATIENT)
Dept: GENERAL RADIOLOGY | Age: 61
DRG: 234 | End: 2021-05-25
Attending: INTERNAL MEDICINE
Payer: COMMERCIAL

## 2021-05-25 VITALS — OXYGEN SATURATION: 100 % | RESPIRATION RATE: 12 BRPM | TEMPERATURE: 99.7 F

## 2021-05-25 LAB
ACTIVATED CLOTTING TIME: 114 SEC (ref 99–130)
ACTIVATED CLOTTING TIME: 116 SEC (ref 99–130)
ACTIVATED CLOTTING TIME: 370 SEC (ref 99–130)
ACTIVATED CLOTTING TIME: 431 SEC (ref 99–130)
ACTIVATED CLOTTING TIME: 451 SEC (ref 99–130)
ACTIVATED CLOTTING TIME: 461 SEC (ref 99–130)
ACTIVATED CLOTTING TIME: 464 SEC (ref 99–130)
ACTIVATED CLOTTING TIME: 526 SEC (ref 99–130)
ACTIVATED CLOTTING TIME: 581 SEC (ref 99–130)
ANION GAP SERPL CALCULATED.3IONS-SCNC: 7 MMOL/L (ref 3–16)
APTT: 31.8 SEC (ref 24.2–36.2)
BASE EXCESS ARTERIAL: -1 (ref -3–3)
BASE EXCESS ARTERIAL: -1 (ref -3–3)
BASE EXCESS ARTERIAL: -2 (ref -3–3)
BASE EXCESS ARTERIAL: -2 (ref -3–3)
BASE EXCESS ARTERIAL: -3 (ref -3–3)
BASE EXCESS ARTERIAL: -4 (ref -3–3)
BASE EXCESS ARTERIAL: 0 (ref -3–3)
BASE EXCESS VENOUS: -2 (ref -3–3)
BUN BLDV-MCNC: 14 MG/DL (ref 7–20)
CALCIUM IONIZED: 1.07 MMOL/L (ref 1.12–1.32)
CALCIUM IONIZED: 1.09 MMOL/L (ref 1.12–1.32)
CALCIUM IONIZED: 1.1 MMOL/L (ref 1.12–1.32)
CALCIUM IONIZED: 1.11 MMOL/L (ref 1.12–1.32)
CALCIUM IONIZED: 1.13 MMOL/L (ref 1.12–1.32)
CALCIUM IONIZED: 1.18 MMOL/L (ref 1.12–1.32)
CALCIUM IONIZED: 1.19 MMOL/L (ref 1.12–1.32)
CALCIUM IONIZED: 1.28 MMOL/L (ref 1.12–1.32)
CALCIUM SERPL-MCNC: 8.1 MG/DL (ref 8.3–10.6)
CHLORIDE BLD-SCNC: 112 MMOL/L (ref 99–110)
CO2: 22 MMOL/L (ref 21–32)
CREAT SERPL-MCNC: 1 MG/DL (ref 0.8–1.3)
FIBRINOGEN: 265 MG/DL (ref 200–397)
GFR AFRICAN AMERICAN: >60
GFR NON-AFRICAN AMERICAN: >60
GLUCOSE BLD-MCNC: 102 MG/DL (ref 70–99)
GLUCOSE BLD-MCNC: 104 MG/DL (ref 70–99)
GLUCOSE BLD-MCNC: 110 MG/DL (ref 70–99)
GLUCOSE BLD-MCNC: 115 MG/DL (ref 70–99)
GLUCOSE BLD-MCNC: 117 MG/DL (ref 70–99)
GLUCOSE BLD-MCNC: 117 MG/DL (ref 70–99)
GLUCOSE BLD-MCNC: 118 MG/DL (ref 70–99)
GLUCOSE BLD-MCNC: 121 MG/DL (ref 70–99)
GLUCOSE BLD-MCNC: 124 MG/DL (ref 70–99)
GLUCOSE BLD-MCNC: 124 MG/DL (ref 70–99)
GLUCOSE BLD-MCNC: 127 MG/DL (ref 70–99)
GLUCOSE BLD-MCNC: 131 MG/DL (ref 70–99)
GLUCOSE BLD-MCNC: 133 MG/DL (ref 70–99)
GLUCOSE BLD-MCNC: 142 MG/DL (ref 70–99)
GLUCOSE BLD-MCNC: 142 MG/DL (ref 70–99)
GLUCOSE BLD-MCNC: 151 MG/DL (ref 70–99)
GLUCOSE BLD-MCNC: 180 MG/DL (ref 70–99)
GLUCOSE BLD-MCNC: 214 MG/DL (ref 70–99)
GLUCOSE BLD-MCNC: 76 MG/DL (ref 70–99)
GLUCOSE BLD-MCNC: 77 MG/DL (ref 70–99)
GLUCOSE BLD-MCNC: 98 MG/DL (ref 70–99)
HCO3 ARTERIAL: 21.1 MMOL/L (ref 21–29)
HCO3 ARTERIAL: 21.2 MMOL/L (ref 21–29)
HCO3 ARTERIAL: 21.6 MMOL/L (ref 21–29)
HCO3 ARTERIAL: 22 MMOL/L (ref 21–29)
HCO3 ARTERIAL: 22.1 MMOL/L (ref 21–29)
HCO3 ARTERIAL: 22.8 MMOL/L (ref 21–29)
HCO3 ARTERIAL: 22.9 MMOL/L (ref 21–29)
HCO3 ARTERIAL: 23.1 MMOL/L (ref 21–29)
HCO3 ARTERIAL: 23.3 MMOL/L (ref 21–29)
HCO3 ARTERIAL: 24.2 MMOL/L (ref 21–29)
HCO3 ARTERIAL: 25 MMOL/L (ref 21–29)
HCO3 VENOUS: 23.6 MMOL/L (ref 23–29)
HCT VFR BLD CALC: 23.9 % (ref 40.5–52.5)
HEMOGLOBIN: 6.3 GM/DL (ref 13.5–17.5)
HEMOGLOBIN: 6.3 GM/DL (ref 13.5–17.5)
HEMOGLOBIN: 6.5 GM/DL (ref 13.5–17.5)
HEMOGLOBIN: 6.6 GM/DL (ref 13.5–17.5)
HEMOGLOBIN: 7.2 GM/DL (ref 13.5–17.5)
HEMOGLOBIN: 7.5 G/DL (ref 13.5–17.5)
HEMOGLOBIN: 7.8 GM/DL (ref 13.5–17.5)
HEMOGLOBIN: 7.9 GM/DL (ref 13.5–17.5)
HEMOGLOBIN: 8.9 GM/DL (ref 13.5–17.5)
HEMOGLOBIN: 9.6 GM/DL (ref 13.5–17.5)
INR BLD: 1.31 (ref 0.86–1.14)
LACTATE: 0.83 MMOL/L (ref 0.4–2)
LACTATE: 1.07 MMOL/L (ref 0.4–2)
LACTATE: 1.17 MMOL/L (ref 0.4–2)
LACTATE: 1.19 MMOL/L (ref 0.4–2)
LACTATE: 1.34 MMOL/L (ref 0.4–2)
LACTATE: 1.69 MMOL/L (ref 0.4–2)
LACTATE: 1.9 MMOL/L (ref 0.4–2)
LACTATE: 2.03 MMOL/L (ref 0.4–2)
MAGNESIUM: 2.8 MG/DL (ref 1.8–2.4)
MCH RBC QN AUTO: 22.8 PG (ref 26–34)
MCHC RBC AUTO-ENTMCNC: 31.5 G/DL (ref 31–36)
MCV RBC AUTO: 72.3 FL (ref 80–100)
O2 SAT, ARTERIAL: 100 % (ref 93–100)
O2 SAT, ARTERIAL: 94 % (ref 93–100)
O2 SAT, ARTERIAL: 94 % (ref 93–100)
O2 SAT, ARTERIAL: 95 % (ref 93–100)
O2 SAT, ARTERIAL: 97 % (ref 93–100)
O2 SAT, ARTERIAL: 99 % (ref 93–100)
O2 SAT, VEN: 67 %
PCO2 ARTERIAL: 34.1 MM HG (ref 35–45)
PCO2 ARTERIAL: 36 MM HG (ref 35–45)
PCO2 ARTERIAL: 36.2 MM HG (ref 35–45)
PCO2 ARTERIAL: 36.2 MM HG (ref 35–45)
PCO2 ARTERIAL: 38.1 MM HG (ref 35–45)
PCO2 ARTERIAL: 38.2 MM HG (ref 35–45)
PCO2 ARTERIAL: 38.8 MM HG (ref 35–45)
PCO2 ARTERIAL: 40.2 MM HG (ref 35–45)
PCO2 ARTERIAL: 41.1 MM HG (ref 35–45)
PCO2 ARTERIAL: 41.3 MM HG (ref 35–45)
PCO2 ARTERIAL: 42.1 MM HG (ref 35–45)
PCO2, VEN: 44.8 MM HG (ref 40–50)
PDW BLD-RTO: 17.8 % (ref 12.4–15.4)
PERFORMED ON: ABNORMAL
PH ARTERIAL: 7.33 (ref 7.35–7.45)
PH ARTERIAL: 7.34 (ref 7.35–7.45)
PH ARTERIAL: 7.36 (ref 7.35–7.45)
PH ARTERIAL: 7.37 (ref 7.35–7.45)
PH ARTERIAL: 7.38 (ref 7.35–7.45)
PH ARTERIAL: 7.39 (ref 7.35–7.45)
PH ARTERIAL: 7.39 (ref 7.35–7.45)
PH ARTERIAL: 7.4 (ref 7.35–7.45)
PH ARTERIAL: 7.4 (ref 7.35–7.45)
PH ARTERIAL: 7.41 (ref 7.35–7.45)
PH ARTERIAL: 7.42 (ref 7.35–7.45)
PH VENOUS: 7.33 (ref 7.35–7.45)
PLATELET # BLD: 127 K/UL (ref 135–450)
PMV BLD AUTO: 9.4 FL (ref 5–10.5)
PO2 ARTERIAL: 132 MM HG (ref 75–108)
PO2 ARTERIAL: 207.1 MM HG (ref 75–108)
PO2 ARTERIAL: 292.3 MM HG (ref 75–108)
PO2 ARTERIAL: 327 MM HG (ref 75–108)
PO2 ARTERIAL: 426.1 MM HG (ref 75–108)
PO2 ARTERIAL: 495.5 MM HG (ref 75–108)
PO2 ARTERIAL: 497.2 MM HG (ref 75–108)
PO2 ARTERIAL: 73.5 MM HG (ref 75–108)
PO2 ARTERIAL: 74.9 MM HG (ref 75–108)
PO2 ARTERIAL: 79.2 MM HG (ref 75–108)
PO2 ARTERIAL: 88.3 MM HG (ref 75–108)
PO2, VEN: 38 MM HG
POC HEMATOCRIT: 18 % (ref 40.5–52.5)
POC HEMATOCRIT: 19 % (ref 40.5–52.5)
POC HEMATOCRIT: 21 % (ref 40.5–52.5)
POC HEMATOCRIT: 23 % (ref 40.5–52.5)
POC HEMATOCRIT: 23 % (ref 40.5–52.5)
POC HEMATOCRIT: 26 % (ref 40.5–52.5)
POC HEMATOCRIT: 28 % (ref 40.5–52.5)
POC PATIENT TEMP: 102
POC PATIENT TEMP: 99
POC POTASSIUM: 3.8 MMOL/L (ref 3.5–5.1)
POC POTASSIUM: 3.9 MMOL/L (ref 3.5–5.1)
POC POTASSIUM: 4.2 MMOL/L (ref 3.5–5.1)
POC POTASSIUM: 4.3 MMOL/L (ref 3.5–5.1)
POC POTASSIUM: 4.4 MMOL/L (ref 3.5–5.1)
POC POTASSIUM: 4.4 MMOL/L (ref 3.5–5.1)
POC POTASSIUM: 4.6 MMOL/L (ref 3.5–5.1)
POC POTASSIUM: 4.6 MMOL/L (ref 3.5–5.1)
POC POTASSIUM: 4.8 MMOL/L (ref 3.5–5.1)
POC POTASSIUM: 4.8 MMOL/L (ref 3.5–5.1)
POC SAMPLE TYPE: ABNORMAL
POC SODIUM: 135 MMOL/L (ref 136–145)
POC SODIUM: 135 MMOL/L (ref 136–145)
POC SODIUM: 137 MMOL/L (ref 136–145)
POC SODIUM: 138 MMOL/L (ref 136–145)
POC SODIUM: 139 MMOL/L (ref 136–145)
POC SODIUM: 141 MMOL/L (ref 136–145)
POTASSIUM SERPL-SCNC: 4.3 MMOL/L (ref 3.5–5.1)
PROTHROMBIN TIME: 15.2 SEC (ref 10–13.2)
RBC # BLD: 3.31 M/UL (ref 4.2–5.9)
SODIUM BLD-SCNC: 141 MMOL/L (ref 136–145)
TCO2 ARTERIAL: 22 MMOL/L
TCO2 ARTERIAL: 22 MMOL/L
TCO2 ARTERIAL: 23 MMOL/L
TCO2 ARTERIAL: 24 MMOL/L
TCO2 ARTERIAL: 25 MMOL/L
TCO2 ARTERIAL: 26 MMOL/L
TCO2 CALC VENOUS: 25 MMOL/L
WBC # BLD: 10 K/UL (ref 4–11)

## 2021-05-25 PROCEDURE — 2580000003 HC RX 258: Performed by: THORACIC SURGERY (CARDIOTHORACIC VASCULAR SURGERY)

## 2021-05-25 PROCEDURE — 5A1221Z PERFORMANCE OF CARDIAC OUTPUT, CONTINUOUS: ICD-10-PCS | Performed by: THORACIC SURGERY (CARDIOTHORACIC VASCULAR SURGERY)

## 2021-05-25 PROCEDURE — 2700000000 HC OXYGEN THERAPY PER DAY

## 2021-05-25 PROCEDURE — 85014 HEMATOCRIT: CPT

## 2021-05-25 PROCEDURE — 3600000018 HC SURGERY OHS ADDTL 15MIN: Performed by: THORACIC SURGERY (CARDIOTHORACIC VASCULAR SURGERY)

## 2021-05-25 PROCEDURE — 2720000010 HC SURG SUPPLY STERILE: Performed by: THORACIC SURGERY (CARDIOTHORACIC VASCULAR SURGERY)

## 2021-05-25 PROCEDURE — 05HM33Z INSERTION OF INFUSION DEVICE INTO RIGHT INTERNAL JUGULAR VEIN, PERCUTANEOUS APPROACH: ICD-10-PCS | Performed by: INTERNAL MEDICINE

## 2021-05-25 PROCEDURE — 06BQ4ZZ EXCISION OF LEFT SAPHENOUS VEIN, PERCUTANEOUS ENDOSCOPIC APPROACH: ICD-10-PCS | Performed by: THORACIC SURGERY (CARDIOTHORACIC VASCULAR SURGERY)

## 2021-05-25 PROCEDURE — 36415 COLL VENOUS BLD VENIPUNCTURE: CPT

## 2021-05-25 PROCEDURE — 85027 COMPLETE CBC AUTOMATED: CPT

## 2021-05-25 PROCEDURE — C9248 INJ, CLEVIDIPINE BUTYRATE: HCPCS | Performed by: THORACIC SURGERY (CARDIOTHORACIC VASCULAR SURGERY)

## 2021-05-25 PROCEDURE — 02L70CK OCCLUSION OF LEFT ATRIAL APPENDAGE WITH EXTRALUMINAL DEVICE, OPEN APPROACH: ICD-10-PCS | Performed by: THORACIC SURGERY (CARDIOTHORACIC VASCULAR SURGERY)

## 2021-05-25 PROCEDURE — 83605 ASSAY OF LACTIC ACID: CPT

## 2021-05-25 PROCEDURE — 7100000010 HC PHASE II RECOVERY - FIRST 15 MIN

## 2021-05-25 PROCEDURE — 3600000008 HC SURGERY OHS BASE: Performed by: THORACIC SURGERY (CARDIOTHORACIC VASCULAR SURGERY)

## 2021-05-25 PROCEDURE — 2580000003 HC RX 258: Performed by: NURSE PRACTITIONER

## 2021-05-25 PROCEDURE — 94640 AIRWAY INHALATION TREATMENT: CPT

## 2021-05-25 PROCEDURE — 2100000000 HC CCU R&B

## 2021-05-25 PROCEDURE — 6370000000 HC RX 637 (ALT 250 FOR IP): Performed by: INTERNAL MEDICINE

## 2021-05-25 PROCEDURE — 85347 COAGULATION TIME ACTIVATED: CPT

## 2021-05-25 PROCEDURE — 33519 CABG ARTERY-VEIN THREE: CPT | Performed by: THORACIC SURGERY (CARDIOTHORACIC VASCULAR SURGERY)

## 2021-05-25 PROCEDURE — 85610 PROTHROMBIN TIME: CPT

## 2021-05-25 PROCEDURE — 84132 ASSAY OF SERUM POTASSIUM: CPT

## 2021-05-25 PROCEDURE — 2500000003 HC RX 250 WO HCPCS: Performed by: THORACIC SURGERY (CARDIOTHORACIC VASCULAR SURGERY)

## 2021-05-25 PROCEDURE — 6360000002 HC RX W HCPCS: Performed by: ANESTHESIOLOGY

## 2021-05-25 PROCEDURE — C9113 INJ PANTOPRAZOLE SODIUM, VIA: HCPCS | Performed by: NURSE PRACTITIONER

## 2021-05-25 PROCEDURE — 33533 CABG ARTERIAL SINGLE: CPT | Performed by: THORACIC SURGERY (CARDIOTHORACIC VASCULAR SURGERY)

## 2021-05-25 PROCEDURE — 7100000011 HC PHASE II RECOVERY - ADDTL 15 MIN

## 2021-05-25 PROCEDURE — 3700000001 HC ADD 15 MINUTES (ANESTHESIA): Performed by: THORACIC SURGERY (CARDIOTHORACIC VASCULAR SURGERY)

## 2021-05-25 PROCEDURE — 82947 ASSAY GLUCOSE BLOOD QUANT: CPT

## 2021-05-25 PROCEDURE — 6370000000 HC RX 637 (ALT 250 FOR IP): Performed by: NURSE PRACTITIONER

## 2021-05-25 PROCEDURE — 6360000002 HC RX W HCPCS: Performed by: THORACIC SURGERY (CARDIOTHORACIC VASCULAR SURGERY)

## 2021-05-25 PROCEDURE — 2580000003 HC RX 258: Performed by: ANESTHESIOLOGY

## 2021-05-25 PROCEDURE — 6360000002 HC RX W HCPCS: Performed by: NURSE PRACTITIONER

## 2021-05-25 PROCEDURE — 2500000003 HC RX 250 WO HCPCS: Performed by: ANESTHESIOLOGY

## 2021-05-25 PROCEDURE — 6370000000 HC RX 637 (ALT 250 FOR IP): Performed by: THORACIC SURGERY (CARDIOTHORACIC VASCULAR SURGERY)

## 2021-05-25 PROCEDURE — 36430 TRANSFUSION BLD/BLD COMPNT: CPT

## 2021-05-25 PROCEDURE — 6370000000 HC RX 637 (ALT 250 FOR IP)

## 2021-05-25 PROCEDURE — 99999 PR OFFICE/OUTPT VISIT,PROCEDURE ONLY: CPT | Performed by: PHYSICIAN ASSISTANT

## 2021-05-25 PROCEDURE — 83735 ASSAY OF MAGNESIUM: CPT

## 2021-05-25 PROCEDURE — 71045 X-RAY EXAM CHEST 1 VIEW: CPT

## 2021-05-25 PROCEDURE — 94002 VENT MGMT INPAT INIT DAY: CPT

## 2021-05-25 PROCEDURE — 94761 N-INVAS EAR/PLS OXIMETRY MLT: CPT

## 2021-05-25 PROCEDURE — C9290 INJ, BUPIVACAINE LIPOSOME: HCPCS | Performed by: THORACIC SURGERY (CARDIOTHORACIC VASCULAR SURGERY)

## 2021-05-25 PROCEDURE — C1751 CATH, INF, PER/CENT/MIDLINE: HCPCS | Performed by: THORACIC SURGERY (CARDIOTHORACIC VASCULAR SURGERY)

## 2021-05-25 PROCEDURE — 3700000000 HC ANESTHESIA ATTENDED CARE: Performed by: THORACIC SURGERY (CARDIOTHORACIC VASCULAR SURGERY)

## 2021-05-25 PROCEDURE — 84295 ASSAY OF SERUM SODIUM: CPT

## 2021-05-25 PROCEDURE — 2709999900 HC NON-CHARGEABLE SUPPLY: Performed by: THORACIC SURGERY (CARDIOTHORACIC VASCULAR SURGERY)

## 2021-05-25 PROCEDURE — 0T7D8ZZ DILATION OF URETHRA, VIA NATURAL OR ARTIFICIAL OPENING ENDOSCOPIC: ICD-10-PCS | Performed by: UROLOGY

## 2021-05-25 PROCEDURE — 82803 BLOOD GASES ANY COMBINATION: CPT

## 2021-05-25 PROCEDURE — 85730 THROMBOPLASTIN TIME PARTIAL: CPT

## 2021-05-25 PROCEDURE — B24BZZ4 ULTRASONOGRAPHY OF HEART WITH AORTA, TRANSESOPHAGEAL: ICD-10-PCS | Performed by: THORACIC SURGERY (CARDIOTHORACIC VASCULAR SURGERY)

## 2021-05-25 PROCEDURE — 85384 FIBRINOGEN ACTIVITY: CPT

## 2021-05-25 PROCEDURE — 82330 ASSAY OF CALCIUM: CPT

## 2021-05-25 PROCEDURE — 5A1213Z PERFORMANCE OF CARDIAC PACING, INTERMITTENT: ICD-10-PCS | Performed by: THORACIC SURGERY (CARDIOTHORACIC VASCULAR SURGERY)

## 2021-05-25 PROCEDURE — C1729 CATH, DRAINAGE: HCPCS | Performed by: THORACIC SURGERY (CARDIOTHORACIC VASCULAR SURGERY)

## 2021-05-25 PROCEDURE — 02100Z9 BYPASS CORONARY ARTERY, ONE ARTERY FROM LEFT INTERNAL MAMMARY, OPEN APPROACH: ICD-10-PCS | Performed by: THORACIC SURGERY (CARDIOTHORACIC VASCULAR SURGERY)

## 2021-05-25 PROCEDURE — 33508 ENDOSCOPIC VEIN HARVEST: CPT | Performed by: THORACIC SURGERY (CARDIOTHORACIC VASCULAR SURGERY)

## 2021-05-25 PROCEDURE — C1889 IMPLANT/INSERT DEVICE, NOC: HCPCS | Performed by: THORACIC SURGERY (CARDIOTHORACIC VASCULAR SURGERY)

## 2021-05-25 PROCEDURE — 80048 BASIC METABOLIC PNL TOTAL CA: CPT

## 2021-05-25 PROCEDURE — 021209W BYPASS CORONARY ARTERY, THREE ARTERIES FROM AORTA WITH AUTOLOGOUS VENOUS TISSUE, OPEN APPROACH: ICD-10-PCS | Performed by: THORACIC SURGERY (CARDIOTHORACIC VASCULAR SURGERY)

## 2021-05-25 PROCEDURE — 3E0T3BZ INTRODUCTION OF ANESTHETIC AGENT INTO PERIPHERAL NERVES AND PLEXI, PERCUTANEOUS APPROACH: ICD-10-PCS | Performed by: THORACIC SURGERY (CARDIOTHORACIC VASCULAR SURGERY)

## 2021-05-25 DEVICE — Z INACTIVE USE 2424445 DEVICE OCCL CLP L50MM SM FOOTPRINT 1 HND APPL SUTURELESS W/: Type: IMPLANTABLE DEVICE | Status: FUNCTIONAL

## 2021-05-25 RX ORDER — SODIUM CHLORIDE 9 MG/ML
25 INJECTION, SOLUTION INTRAVENOUS PRN
Status: DISCONTINUED | OUTPATIENT
Start: 2021-05-25 | End: 2021-05-25

## 2021-05-25 RX ORDER — ONDANSETRON 2 MG/ML
4 INJECTION INTRAMUSCULAR; INTRAVENOUS EVERY 8 HOURS PRN
Status: DISCONTINUED | OUTPATIENT
Start: 2021-05-25 | End: 2021-06-02 | Stop reason: HOSPADM

## 2021-05-25 RX ORDER — MEPERIDINE HYDROCHLORIDE 50 MG/ML
25 INJECTION INTRAMUSCULAR; INTRAVENOUS; SUBCUTANEOUS
Status: COMPLETED | OUTPATIENT
Start: 2021-05-25 | End: 2021-05-25

## 2021-05-25 RX ORDER — ESMOLOL HYDROCHLORIDE 10 MG/ML
5 INJECTION INTRAVENOUS ONCE
Status: COMPLETED | OUTPATIENT
Start: 2021-05-25 | End: 2021-05-25

## 2021-05-25 RX ORDER — DEXTROSE MONOHYDRATE 25 G/50ML
12.5 INJECTION, SOLUTION INTRAVENOUS PRN
Status: DISCONTINUED | OUTPATIENT
Start: 2021-05-25 | End: 2021-06-02 | Stop reason: HOSPADM

## 2021-05-25 RX ORDER — PROTAMINE SULFATE 10 MG/ML
50 INJECTION, SOLUTION INTRAVENOUS
Status: ACTIVE | OUTPATIENT
Start: 2021-05-25 | End: 2021-05-25

## 2021-05-25 RX ORDER — HEPARIN SODIUM 1000 [USP'U]/ML
INJECTION, SOLUTION INTRAVENOUS; SUBCUTANEOUS PRN
Status: DISCONTINUED | OUTPATIENT
Start: 2021-05-25 | End: 2021-05-25 | Stop reason: SDUPTHER

## 2021-05-25 RX ORDER — SODIUM CHLORIDE 9 MG/ML
INJECTION, SOLUTION INTRAVENOUS PRN
Status: DISCONTINUED | OUTPATIENT
Start: 2021-05-25 | End: 2021-05-26

## 2021-05-25 RX ORDER — SUCCINYLCHOLINE/SOD CL,ISO/PF 100 MG/5ML
SYRINGE (ML) INTRAVENOUS PRN
Status: DISCONTINUED | OUTPATIENT
Start: 2021-05-25 | End: 2021-05-25 | Stop reason: SDUPTHER

## 2021-05-25 RX ORDER — HYDRALAZINE HYDROCHLORIDE 20 MG/ML
5 INJECTION INTRAMUSCULAR; INTRAVENOUS EVERY 5 MIN PRN
Status: DISCONTINUED | OUTPATIENT
Start: 2021-05-25 | End: 2021-06-02 | Stop reason: HOSPADM

## 2021-05-25 RX ORDER — NICOTINE POLACRILEX 4 MG
15 LOZENGE BUCCAL PRN
Status: DISCONTINUED | OUTPATIENT
Start: 2021-05-25 | End: 2021-06-02 | Stop reason: HOSPADM

## 2021-05-25 RX ORDER — OXYCODONE HYDROCHLORIDE 5 MG/1
10 TABLET ORAL EVERY 4 HOURS PRN
Status: DISCONTINUED | OUTPATIENT
Start: 2021-05-25 | End: 2021-06-02 | Stop reason: HOSPADM

## 2021-05-25 RX ORDER — BUPIVACAINE HYDROCHLORIDE 2.5 MG/ML
INJECTION, SOLUTION EPIDURAL; INFILTRATION; INTRACAUDAL
Status: COMPLETED | OUTPATIENT
Start: 2021-05-25 | End: 2021-05-25

## 2021-05-25 RX ORDER — LISINOPRIL 5 MG/1
2.5 TABLET ORAL
Status: DISCONTINUED | OUTPATIENT
Start: 2021-05-29 | End: 2021-06-02 | Stop reason: HOSPADM

## 2021-05-25 RX ORDER — FONDAPARINUX SODIUM 2.5 MG/.5ML
2.5 INJECTION SUBCUTANEOUS DAILY
Status: DISCONTINUED | OUTPATIENT
Start: 2021-05-26 | End: 2021-06-02 | Stop reason: HOSPADM

## 2021-05-25 RX ORDER — POLYETHYLENE GLYCOL 3350 17 G/17G
17 POWDER, FOR SOLUTION ORAL DAILY PRN
Status: DISCONTINUED | OUTPATIENT
Start: 2021-05-25 | End: 2021-06-02 | Stop reason: HOSPADM

## 2021-05-25 RX ORDER — ATORVASTATIN CALCIUM 40 MG/1
40 TABLET, FILM COATED ORAL NIGHTLY
Status: DISCONTINUED | OUTPATIENT
Start: 2021-05-26 | End: 2021-06-02 | Stop reason: HOSPADM

## 2021-05-25 RX ORDER — SODIUM CHLORIDE 0.9 % (FLUSH) 0.9 %
10 SYRINGE (ML) INJECTION PRN
Status: DISCONTINUED | OUTPATIENT
Start: 2021-05-25 | End: 2021-06-02 | Stop reason: HOSPADM

## 2021-05-25 RX ORDER — FENTANYL CITRATE 50 UG/ML
25 INJECTION, SOLUTION INTRAMUSCULAR; INTRAVENOUS
Status: DISCONTINUED | OUTPATIENT
Start: 2021-05-25 | End: 2021-06-02 | Stop reason: HOSPADM

## 2021-05-25 RX ORDER — ASPIRIN 300 MG/1
150 SUPPOSITORY RECTAL DAILY
Status: ACTIVE | OUTPATIENT
Start: 2021-05-25 | End: 2021-05-26

## 2021-05-25 RX ORDER — MIDAZOLAM HYDROCHLORIDE 5 MG/ML
INJECTION INTRAMUSCULAR; INTRAVENOUS PRN
Status: DISCONTINUED | OUTPATIENT
Start: 2021-05-25 | End: 2021-05-25 | Stop reason: SDUPTHER

## 2021-05-25 RX ORDER — SODIUM CHLORIDE 0.9 % (FLUSH) 0.9 %
10 SYRINGE (ML) INJECTION EVERY 12 HOURS SCHEDULED
Status: DISCONTINUED | OUTPATIENT
Start: 2021-05-25 | End: 2021-06-02 | Stop reason: HOSPADM

## 2021-05-25 RX ORDER — MAGNESIUM HYDROXIDE 1200 MG/15ML
LIQUID ORAL CONTINUOUS PRN
Status: COMPLETED | OUTPATIENT
Start: 2021-05-25 | End: 2021-05-25

## 2021-05-25 RX ORDER — ROCURONIUM BROMIDE 10 MG/ML
INJECTION, SOLUTION INTRAVENOUS PRN
Status: DISCONTINUED | OUTPATIENT
Start: 2021-05-25 | End: 2021-05-25 | Stop reason: SDUPTHER

## 2021-05-25 RX ORDER — ETOMIDATE 2 MG/ML
INJECTION INTRAVENOUS PRN
Status: DISCONTINUED | OUTPATIENT
Start: 2021-05-25 | End: 2021-05-25 | Stop reason: SDUPTHER

## 2021-05-25 RX ORDER — DIPHENHYDRAMINE HCL 25 MG
25 TABLET ORAL NIGHTLY PRN
Status: DISCONTINUED | OUTPATIENT
Start: 2021-05-26 | End: 2021-06-02 | Stop reason: HOSPADM

## 2021-05-25 RX ORDER — METOPROLOL TARTRATE 5 MG/5ML
2.5 INJECTION INTRAVENOUS EVERY 10 MIN PRN
Status: DISCONTINUED | OUTPATIENT
Start: 2021-05-25 | End: 2021-06-02 | Stop reason: HOSPADM

## 2021-05-25 RX ORDER — PANTOPRAZOLE SODIUM 40 MG/1
40 TABLET, DELAYED RELEASE ORAL DAILY
Status: DISCONTINUED | OUTPATIENT
Start: 2021-05-25 | End: 2021-06-02 | Stop reason: HOSPADM

## 2021-05-25 RX ORDER — ACETAMINOPHEN 650 MG/1
650 SUPPOSITORY RECTAL EVERY 4 HOURS PRN
Status: DISCONTINUED | OUTPATIENT
Start: 2021-05-25 | End: 2021-05-26

## 2021-05-25 RX ORDER — SENNA AND DOCUSATE SODIUM 50; 8.6 MG/1; MG/1
1 TABLET, FILM COATED ORAL 2 TIMES DAILY
Status: DISCONTINUED | OUTPATIENT
Start: 2021-05-25 | End: 2021-06-02 | Stop reason: HOSPADM

## 2021-05-25 RX ORDER — ALBUMIN, HUMAN INJ 5% 5 %
25 SOLUTION INTRAVENOUS PRN
Status: DISCONTINUED | OUTPATIENT
Start: 2021-05-25 | End: 2021-05-26

## 2021-05-25 RX ORDER — SODIUM CHLORIDE 9 MG/ML
INJECTION, SOLUTION INTRAVENOUS CONTINUOUS
Status: DISCONTINUED | OUTPATIENT
Start: 2021-05-25 | End: 2021-05-26

## 2021-05-25 RX ORDER — MILRINONE LACTATE 0.2 MG/ML
0.38 INJECTION, SOLUTION INTRAVENOUS CONTINUOUS PRN
Status: DISCONTINUED | OUTPATIENT
Start: 2021-05-25 | End: 2021-05-26

## 2021-05-25 RX ORDER — PANTOPRAZOLE SODIUM 40 MG/10ML
40 INJECTION, POWDER, LYOPHILIZED, FOR SOLUTION INTRAVENOUS DAILY
Status: DISCONTINUED | OUTPATIENT
Start: 2021-05-25 | End: 2021-05-26

## 2021-05-25 RX ORDER — FUROSEMIDE 10 MG/ML
20 INJECTION INTRAMUSCULAR; INTRAVENOUS 2 TIMES DAILY
Status: DISCONTINUED | OUTPATIENT
Start: 2021-05-26 | End: 2021-05-31

## 2021-05-25 RX ORDER — OXYCODONE HYDROCHLORIDE 5 MG/1
5 TABLET ORAL EVERY 4 HOURS PRN
Status: DISCONTINUED | OUTPATIENT
Start: 2021-05-25 | End: 2021-06-02 | Stop reason: HOSPADM

## 2021-05-25 RX ORDER — FENTANYL CITRATE 50 UG/ML
INJECTION, SOLUTION INTRAMUSCULAR; INTRAVENOUS PRN
Status: DISCONTINUED | OUTPATIENT
Start: 2021-05-25 | End: 2021-05-25 | Stop reason: SDUPTHER

## 2021-05-25 RX ORDER — ALBUTEROL SULFATE 2.5 MG/3ML
2.5 SOLUTION RESPIRATORY (INHALATION)
Status: DISCONTINUED | OUTPATIENT
Start: 2021-05-25 | End: 2021-06-02 | Stop reason: HOSPADM

## 2021-05-25 RX ORDER — ALBUTEROL SULFATE 90 UG/1
2 AEROSOL, METERED RESPIRATORY (INHALATION) EVERY 6 HOURS PRN
Status: DISCONTINUED | OUTPATIENT
Start: 2021-05-25 | End: 2021-06-02 | Stop reason: HOSPADM

## 2021-05-25 RX ORDER — ACETAMINOPHEN 650 MG/1
650 SUPPOSITORY RECTAL EVERY 4 HOURS PRN
Status: DISCONTINUED | OUTPATIENT
Start: 2021-05-25 | End: 2021-05-25

## 2021-05-25 RX ORDER — DEXTROSE MONOHYDRATE 50 MG/ML
100 INJECTION, SOLUTION INTRAVENOUS PRN
Status: DISCONTINUED | OUTPATIENT
Start: 2021-05-25 | End: 2021-06-02 | Stop reason: HOSPADM

## 2021-05-25 RX ORDER — ACETAMINOPHEN 500 MG
1000 TABLET ORAL EVERY 6 HOURS
Status: DISCONTINUED | OUTPATIENT
Start: 2021-05-25 | End: 2021-06-02 | Stop reason: HOSPADM

## 2021-05-25 RX ORDER — POTASSIUM CHLORIDE 750 MG/1
10 TABLET, FILM COATED, EXTENDED RELEASE ORAL
Status: DISCONTINUED | OUTPATIENT
Start: 2021-05-26 | End: 2021-06-02 | Stop reason: HOSPADM

## 2021-05-25 RX ORDER — MIDAZOLAM HYDROCHLORIDE 2 MG/2ML
1 INJECTION, SOLUTION INTRAMUSCULAR; INTRAVENOUS
Status: ACTIVE | OUTPATIENT
Start: 2021-05-25 | End: 2021-05-26

## 2021-05-25 RX ORDER — PROTAMINE SULFATE 10 MG/ML
INJECTION, SOLUTION INTRAVENOUS PRN
Status: DISCONTINUED | OUTPATIENT
Start: 2021-05-25 | End: 2021-05-25 | Stop reason: SDUPTHER

## 2021-05-25 RX ORDER — POTASSIUM CHLORIDE 29.8 MG/ML
20 INJECTION INTRAVENOUS PRN
Status: DISCONTINUED | OUTPATIENT
Start: 2021-05-25 | End: 2021-06-02 | Stop reason: HOSPADM

## 2021-05-25 RX ORDER — MAGNESIUM SULFATE IN WATER 40 MG/ML
2000 INJECTION, SOLUTION INTRAVENOUS PRN
Status: DISCONTINUED | OUTPATIENT
Start: 2021-05-25 | End: 2021-06-02 | Stop reason: HOSPADM

## 2021-05-25 RX ORDER — SODIUM CHLORIDE 9 MG/ML
10 INJECTION INTRAVENOUS DAILY
Status: DISCONTINUED | OUTPATIENT
Start: 2021-05-25 | End: 2021-05-26

## 2021-05-25 RX ORDER — SODIUM CHLORIDE 9 MG/ML
INJECTION INTRAVENOUS
Status: COMPLETED | OUTPATIENT
Start: 2021-05-25 | End: 2021-05-25

## 2021-05-25 RX ORDER — CALCIUM CHLORIDE 100 MG/ML
INJECTION INTRAVENOUS; INTRAVENTRICULAR
Status: COMPLETED | OUTPATIENT
Start: 2021-05-25 | End: 2021-05-25

## 2021-05-25 RX ORDER — 0.9 % SODIUM CHLORIDE 0.9 %
500 INTRAVENOUS SOLUTION INTRAVENOUS CONTINUOUS PRN
Status: DISCONTINUED | OUTPATIENT
Start: 2021-05-25 | End: 2021-06-02 | Stop reason: HOSPADM

## 2021-05-25 RX ADMIN — VANCOMYCIN HYDROCHLORIDE 1500 MG: 10 INJECTION, POWDER, LYOPHILIZED, FOR SOLUTION INTRAVENOUS at 06:41

## 2021-05-25 RX ADMIN — PROTAMINE SULFATE 250 MG: 10 INJECTION, SOLUTION INTRAVENOUS at 11:18

## 2021-05-25 RX ADMIN — CEFAZOLIN 2000 MG: 10 INJECTION, POWDER, FOR SOLUTION INTRAVENOUS at 20:26

## 2021-05-25 RX ADMIN — MUPIROCIN: 20 OINTMENT TOPICAL at 19:35

## 2021-05-25 RX ADMIN — FENTANYL CITRATE 25 MCG: 50 INJECTION, SOLUTION INTRAMUSCULAR; INTRAVENOUS at 16:25

## 2021-05-25 RX ADMIN — Medication 2 PUFF: at 21:41

## 2021-05-25 RX ADMIN — ESMOLOL HYDROCHLORIDE 5 MG: 10 INJECTION, SOLUTION INTRAVENOUS at 06:34

## 2021-05-25 RX ADMIN — HEPARIN SODIUM 30000 UNITS: 1000 INJECTION INTRAVENOUS; SUBCUTANEOUS at 08:36

## 2021-05-25 RX ADMIN — ACETAMINOPHEN 650 MG: 650 SUPPOSITORY RECTAL at 17:12

## 2021-05-25 RX ADMIN — SODIUM CHLORIDE, POTASSIUM CHLORIDE, SODIUM LACTATE AND CALCIUM CHLORIDE: 600; 310; 30; 20 INJECTION, SOLUTION INTRAVENOUS at 06:41

## 2021-05-25 RX ADMIN — GABAPENTIN 600 MG: 300 CAPSULE ORAL at 05:17

## 2021-05-25 RX ADMIN — SODIUM CHLORIDE 1.14 UNITS/HR: 9 INJECTION, SOLUTION INTRAVENOUS at 13:20

## 2021-05-25 RX ADMIN — AMINOCAPROIC ACID 1 G/HR: 250 INJECTION, SOLUTION INTRAVENOUS at 07:05

## 2021-05-25 RX ADMIN — MIDAZOLAM 5 MG: 5 INJECTION INTRAMUSCULAR; INTRAVENOUS at 07:05

## 2021-05-25 RX ADMIN — FENTANYL CITRATE 250 MCG: 50 INJECTION, SOLUTION INTRAMUSCULAR; INTRAVENOUS at 08:38

## 2021-05-25 RX ADMIN — STANDARDIZED SENNA CONCENTRATE AND DOCUSATE SODIUM 1 TABLET: 8.6; 5 TABLET ORAL at 19:34

## 2021-05-25 RX ADMIN — TAMSULOSIN HYDROCHLORIDE 0.4 MG: 0.4 CAPSULE ORAL at 19:34

## 2021-05-25 RX ADMIN — ALBUTEROL SULFATE 2.5 MG: 2.5 SOLUTION RESPIRATORY (INHALATION) at 21:40

## 2021-05-25 RX ADMIN — ASPIRIN 325 MG: 325 TABLET, COATED ORAL at 19:37

## 2021-05-25 RX ADMIN — ROCURONIUM BROMIDE 50 MG: 10 INJECTION, SOLUTION INTRAVENOUS at 07:05

## 2021-05-25 RX ADMIN — MEPERIDINE HYDROCHLORIDE 25 MG: 50 INJECTION INTRAMUSCULAR; INTRAVENOUS; SUBCUTANEOUS at 16:43

## 2021-05-25 RX ADMIN — Medication 10 ML: at 21:56

## 2021-05-25 RX ADMIN — ETOMIDATE 20 MG: 20 INJECTION, SOLUTION INTRAVENOUS at 07:05

## 2021-05-25 RX ADMIN — POLYETHYLENE GLYCOL 3350 17 G: 17 POWDER, FOR SOLUTION ORAL at 19:11

## 2021-05-25 RX ADMIN — POTASSIUM CHLORIDE 20 MEQ: 29.8 INJECTION, SOLUTION INTRAVENOUS at 14:43

## 2021-05-25 RX ADMIN — Medication 200 MG: at 07:05

## 2021-05-25 RX ADMIN — FENTANYL CITRATE 250 MCG: 50 INJECTION, SOLUTION INTRAMUSCULAR; INTRAVENOUS at 07:05

## 2021-05-25 RX ADMIN — ALBUTEROL SULFATE 2.5 MG: 2.5 SOLUTION RESPIRATORY (INHALATION) at 16:09

## 2021-05-25 RX ADMIN — PANTOPRAZOLE SODIUM 40 MG: 40 INJECTION, POWDER, FOR SOLUTION INTRAVENOUS at 05:17

## 2021-05-25 RX ADMIN — LEVOFLOXACIN 750 MG: 5 INJECTION, SOLUTION INTRAVENOUS at 21:37

## 2021-05-25 RX ADMIN — CLEVIPIDINE 2 MG/HR: 0.5 EMULSION INTRAVENOUS at 18:22

## 2021-05-25 RX ADMIN — ACETAMINOPHEN 1000 MG: 500 TABLET ORAL at 19:34

## 2021-05-25 RX ADMIN — MIDAZOLAM 5 MG: 5 INJECTION INTRAMUSCULAR; INTRAVENOUS at 08:38

## 2021-05-25 RX ADMIN — AMINOCAPROIC ACID 2000 MG: 250 INJECTION, SOLUTION INTRAVENOUS at 07:05

## 2021-05-25 RX ADMIN — SODIUM CHLORIDE 3.42 UNITS/HR: 9 INJECTION, SOLUTION INTRAVENOUS at 22:07

## 2021-05-25 RX ADMIN — ROCURONIUM BROMIDE 50 MG: 10 INJECTION, SOLUTION INTRAVENOUS at 09:11

## 2021-05-25 RX ADMIN — TRAMADOL HYDROCHLORIDE 50 MG: 50 TABLET, FILM COATED ORAL at 23:07

## 2021-05-25 RX ADMIN — OXYCODONE 10 MG: 5 TABLET ORAL at 19:34

## 2021-05-25 RX ADMIN — CLEVIPIDINE 3 MG/HR: 0.5 EMULSION INTRAVENOUS at 16:47

## 2021-05-25 RX ADMIN — FENTANYL CITRATE 25 MCG: 50 INJECTION, SOLUTION INTRAMUSCULAR; INTRAVENOUS at 19:04

## 2021-05-25 RX ADMIN — NOREPINEPHRINE BITARTRATE 20 MCG/MIN: 1 INJECTION, SOLUTION, CONCENTRATE INTRAVENOUS at 11:18

## 2021-05-25 RX ADMIN — SODIUM BICARBONATE 50 MEQ: 84 INJECTION INTRAVENOUS at 16:17

## 2021-05-25 RX ADMIN — VANCOMYCIN HYDROCHLORIDE 1500 MG: 10 INJECTION, POWDER, LYOPHILIZED, FOR SOLUTION INTRAVENOUS at 18:47

## 2021-05-25 RX ADMIN — SODIUM CHLORIDE: 9 INJECTION, SOLUTION INTRAVENOUS at 13:22

## 2021-05-25 ASSESSMENT — PAIN SCALES - GENERAL
PAINLEVEL_OUTOF10: 0
PAINLEVEL_OUTOF10: 8
PAINLEVEL_OUTOF10: 0
PAINLEVEL_OUTOF10: 0
PAINLEVEL_OUTOF10: 8
PAINLEVEL_OUTOF10: 8
PAINLEVEL_OUTOF10: 5
PAINLEVEL_OUTOF10: 0

## 2021-05-25 ASSESSMENT — PULMONARY FUNCTION TESTS
PIF_VALUE: 0
PIF_VALUE: 19
PIF_VALUE: 20
PIF_VALUE: 19
PIF_VALUE: 1
PIF_VALUE: 18
PIF_VALUE: 25
PIF_VALUE: 2
PIF_VALUE: 1
PIF_VALUE: 21
PIF_VALUE: 1
PIF_VALUE: 25
PIF_VALUE: 19
PIF_VALUE: 20
PIF_VALUE: 20
PIF_VALUE: 21
PIF_VALUE: 1
PIF_VALUE: 18
PIF_VALUE: 1
PIF_VALUE: 1
PIF_VALUE: 25
PIF_VALUE: 25
PIF_VALUE: 19
PIF_VALUE: 1
PIF_VALUE: 25
PIF_VALUE: 20
PIF_VALUE: 25
PIF_VALUE: 1
PIF_VALUE: 1
PIF_VALUE: 20
PIF_VALUE: 23
PIF_VALUE: 22
PIF_VALUE: 17
PIF_VALUE: 21
PIF_VALUE: 1
PIF_VALUE: 8
PIF_VALUE: 20
PIF_VALUE: 21
PIF_VALUE: 24
PIF_VALUE: 18
PIF_VALUE: 24
PIF_VALUE: 24
PIF_VALUE: 21
PIF_VALUE: 19
PIF_VALUE: 22
PIF_VALUE: 25
PIF_VALUE: 18
PIF_VALUE: 1
PIF_VALUE: 25
PIF_VALUE: 20
PIF_VALUE: 19
PIF_VALUE: 21
PIF_VALUE: 22
PIF_VALUE: 1
PIF_VALUE: 18
PIF_VALUE: 1
PIF_VALUE: 19
PIF_VALUE: 1
PIF_VALUE: 1
PIF_VALUE: 20
PIF_VALUE: 1
PIF_VALUE: 1
PIF_VALUE: 19
PIF_VALUE: 1
PIF_VALUE: 17
PIF_VALUE: 24
PIF_VALUE: 1
PIF_VALUE: 24
PIF_VALUE: 1
PIF_VALUE: 1
PIF_VALUE: 22
PIF_VALUE: 18
PIF_VALUE: 18
PIF_VALUE: 1
PIF_VALUE: 25
PIF_VALUE: 24
PIF_VALUE: 20
PIF_VALUE: 18
PIF_VALUE: 20
PIF_VALUE: 1
PIF_VALUE: 23
PIF_VALUE: 21
PIF_VALUE: 3
PIF_VALUE: 19
PIF_VALUE: 25
PIF_VALUE: 1
PIF_VALUE: 19
PIF_VALUE: 21
PIF_VALUE: 1
PIF_VALUE: 21
PIF_VALUE: 1
PIF_VALUE: 25
PIF_VALUE: 26
PIF_VALUE: 1
PIF_VALUE: 21
PIF_VALUE: 1
PIF_VALUE: 21
PIF_VALUE: 1
PIF_VALUE: 19
PIF_VALUE: 21
PIF_VALUE: 1
PIF_VALUE: 20
PIF_VALUE: 21
PIF_VALUE: 24
PIF_VALUE: 21
PIF_VALUE: 1
PIF_VALUE: 1
PIF_VALUE: 25
PIF_VALUE: 1
PIF_VALUE: 21
PIF_VALUE: 1
PIF_VALUE: 21
PIF_VALUE: 1
PIF_VALUE: 20
PIF_VALUE: 19
PIF_VALUE: 21
PIF_VALUE: 1
PIF_VALUE: 21
PIF_VALUE: 1
PIF_VALUE: 20
PIF_VALUE: 22
PIF_VALUE: 18
PIF_VALUE: 19
PIF_VALUE: 24
PIF_VALUE: 17
PIF_VALUE: 1
PIF_VALUE: 21
PIF_VALUE: 18
PIF_VALUE: 1
PIF_VALUE: 1
PIF_VALUE: 22
PIF_VALUE: 24
PIF_VALUE: 1
PIF_VALUE: 1
PIF_VALUE: 18
PIF_VALUE: 18
PIF_VALUE: 19
PIF_VALUE: 1
PIF_VALUE: 4
PIF_VALUE: 1
PIF_VALUE: 19
PIF_VALUE: 21
PIF_VALUE: 26
PIF_VALUE: 20
PIF_VALUE: 21
PIF_VALUE: 21

## 2021-05-25 ASSESSMENT — PAIN - FUNCTIONAL ASSESSMENT: PAIN_FUNCTIONAL_ASSESSMENT: 0-10

## 2021-05-25 ASSESSMENT — PAIN SCALES - WONG BAKER: WONGBAKER_NUMERICALRESPONSE: 6

## 2021-05-25 NOTE — PROGRESS NOTES
Patient admitted to CVU from [place] [address] and attached to ventilator and monitors. Report received from anesthesiologist.  Chest x-ray ordered. Labs drawn and sent. Assessment complete. Hemodymanics stable and will continue to monitor. Family let back to see patient. Visiting hours reviewed and all questions answered. Family aware of discharge class. Primary RN Kenneth Gordon.     [address]

## 2021-05-25 NOTE — OP NOTE
Operative Note      Patient: Marylin King  YOB: 1960  MRN: [id number]    Date of Procedure: 5/25/2021    Pre-Op Diagnosis:   #1.unstable angina   #2.severe three-vessel coronary artery disease   #3. diabetes   #4. COPD   #5. peripheral vascular disease   #6.urethral strictures   #7. elevated PSA   #8.hypertension   #9.hyperlipidemia    Post-Op Diagnosis:   #1.unstable angina   #2.severe three-vessel coronary artery disease   #3. diabetes   #4. COPD   #5. peripheral vascular disease   #6.urethral strictures   #7. elevated PSA   #8.hypertension   #9.hyperlipidemia       Procedure(s):  URGENT CABG CORONARY ARTERY BYPASS GRAFTING X 4, CISNEROS GRAFT TO LAD X 1, SEQUENTIAL VEIN GRAFT TO OM1 AND RPDA, VEIN GRAFT TO DIAGONAL WITH PROXIMAL ANASTIMOSIS END TO SIDE TO VEIN GRAFT TO OM1 AND RPDA, LIGATION SANDRO WITH 50 MM ATRICLIP, EVH LEFT LEG, TOTAL CPB, REJI, DOPPLER BYPASS GRAFT FLOW VERIFICATION, INSERTION OF VENTRICULAR PACING WIRES, BILATERAL INTERCOSTAL NERVE BLOCK WITH EXPAREL AND MARCAINE  CYSTOSCOPY, URETHRAL DILATION, INSERTION OF URETHRAL CATHETER    Surgeon(s):  MD Salvador Balderrama MD    Assistant:   First Assistant: Helen Page  Physician Assistant: KYLE Ferris due to the complexity of the case he scrubbed in and assisted during the case. Anesthesia: * No anesthesia type entered *    Estimated Blood Loss (mL): 231     Complications: None    Specimens:   * No specimens in log *    Implants:  Implant Name Type Inv. Item Serial No.  Lot No. LRB No. Used Action   DEVICE OCCL CLP L50MM SM FOOTPRINT 1 HND APPL SUTURELESS W/ - [id number]  DEVICE OCCL CLP L50MM SM FOOTPRINT 1 HND APPL SUTURELESS W/  [employer]WD [id number]  1 Implanted         Drains:   Chest Tube 2 Anterior Mediastinal;Pleural 24 [place]ian (Active)   Suction -20 cm H2O 05/25/21 1224   Chest Tube Airleak No 05/25/21 1224   Dressing Status Clean;Dry; Intact 05/25/21 1224   Dressing Type Dry dressing 05/25/21 1224 Site Assessment Not assessed 05/25/21 1224   Surrounding Skin Dry; Intact 05/25/21 1224   Patency Intervention Stripped; Tip/Tilt 05/25/21 1400   Output (ml) 8 ml 05/25/21 1700       Urethral Catheter  16 fr (Active)   Catheter Indications Perioperative use for selected surgical procedures 05/25/21 1300   Site Assessment No urethral drainage 05/25/21 1300   Urine Color Yellow 05/25/21 1300   Urine Appearance Clear 05/25/21 1300   Output (mL) 125 mL 05/25/21 1700     Indications for the procedure:  Patient is a 78-year-old male with multiple medical comorbidities as described above who presented with unstable angina. He was found on cardiac cath to have severe three-vessel coronary artery disease. After completion of preoperative work-up and risk ratification he was deemed to be an appropriate candidate for urgent surgical revascularization with CABG and left atrial appendage exclusion with an AtriCure clip. Findings: There was no pericardial adhesions or effusion, the ascending aorta was with very mild atheroma on the REJI but with actually very thick walls with friable mushy intima upon creating the proximal aortotomy for the aorto shaphenous anastomosis. For that reason it was elected the second proximal aorto saphnenous anastomosis to the diag to  be performed end to side on the cuellar of the vein graft to the Hen[place] and RPDA and not on the ascending aorta itself. There was no visible myocardial scar to suggest prior MI. The Diag was a 1.75mm target vessel of moderate quality. The OM1 was a 2.25mm target vessel of moderate quality. The RPDA was a 1.75mm target vessle of moderate quality. The mid LAD was a 2.25mm target vessel of moderate quality. The EF was 45% pre and 55% post procedure. The LIMA was of adequate size and quality and had excellent flow and was good to be used for by pass conduit. The vein was of adequate quality and caliber and good to be used for a conduit.  There was no flow into the SANDRO at the end of the procedure. There was excellent doppler flow on all bypass grafts at the end of the procedure. Detailed Description of Procedure:   After informed consent was obtained and placement of monitoring lines, the patient was brought to the operating room and was placed on the OR table in supine position. General  endotracheal anesthesia was induced. Preoperative antibiotics were administered. Dr. Andrea Dodd from urology then performed a cystoscopy, urethral dilations and a urinary catheter placement. Please refer to his separate operative report for this portion of the procedure. A REJI probe was inserted. The patient was prepped and draped in the usual sterile fashion from chin to toes. A proper time out was performed. A standard median sternotomy was carried out at the same time with endoscopic vein harvest from the left leg. At the end of the vein harvest, the vein was found to be of adequate caliber and quality to be used for bypass. The leg incisions were closed in layers in standard fashion. The LIMA retractor was inserted, and the LIMA was harvested as a pedicle. At the end of the LIMA harvest, full heparin dose was administered. The LIMA was transected distally and was found to have excellent flow. It was spatulated, wrapped in papaverine soaked sponge, clamped and set aside. Its distal stump was doubly ligated. The LIMA retractor was removed and a standard sternal retractor was inserted. The pericardium was opened in an inverted-T fashion and it was suspended with stay sutures. The aorta was inspected and palpated and was found normal without any calcifications. Standard aortic and right atrial cannulation was carried out in preparation for cardiopulmonary bypass. An antegrade DLP needle was placed on the ascending aorta. With adequate ACT, we commenced cardiopulmonary bypass. We had excellent forward flow and drainage.    The systemic temperature was allowed to drift down to 34 degrees centigrade. The coronary artery targets were marked on the epicardium. Aortic cross-clamp was applied, and prompt diastolic arrest was achieved with administration of 1.5 liters of antegrade cardioplegia. Cardiac standstill was maintained throughout the case with topical ice cooling and intermittent doses of  antegrade cardioplegia through the DLP needle and down through the vein grafts in 15-20 minutes intervals. Our attention was then turned to the RPDA which was our first target. This was found to be a  1.75 mm  target vessel of moderate quality. A 7 mm arteriotomy was performed and a spatulated vein graft was anastomosed to  the RPDA with a running 7-0 Prolene suture. Before tying the sutures, all three anastomotic lumens were probed to ensure there was no compromise of the anastomosis. Our attention was then turned to the lateral wall. The OM1 was found to be a  2.25 mm target vessel of moderate quality. A 7 mm arteriotomy was performed   A lateral venotomy was carried out on the vein graft to the RPDA in preparation for a side by side crisscross vein graft to the  OM1 anastomosis. This was carried out with a running 7-0 Prolene suture. Before tying the sutures, all three anastomotic lumens were probed to ensure there was no compromise of the anastomosis. The left atrial appendage was then measured and a 50 mm AtriCure clip was applied at the base of the left atrial appendage. The heart was then allowed to fill up with volume and the sequential vein graft to the OM1 and PDA was measured and cut to length in order to reach the ascending aorta. The option of grafting the diagonal with the same vein graft was looked at however it appeared that there would not be enough length to graft 3 targets with the same vein graft. A proximal anastomotic site was created on the ascending aorta with an 11 blade followed by a 4 mm punch.    The ascending aorta was diseased as described above with soft mushy intima. The proximal aorto saphenous anastomosis was carried out with extreme caution in an effort not to raise any plaques with a running 6-0 Prolene suture. Our attention was then turned to the diagonal.  Spatulated vein was brought into the field and was anastomosed to the diagonal after a 7 mm arteriotomy was carried out. This was done with a running 7-0 Prolene suture. Before tying all sutures all 3 anastomotic lumens were probed to ensure there was no compromise of the anastomosis. Cardioplegia was given down through this vein graft and was found to be patent and hemostatic. In order to avoid performing a second aortotomy on a heavily diseased aorta as described above the decision was made to perform this vein graft to the SVG as a T graft off the vein graft to the OM1 and RPDA. 2 bulldogs were applied on the vein graft to the OM1 and RPDA proximally and distally and a 6 mm venotomy was carried out. The vein graft to the diagonal was then transected in length in order to reach comfortably to the cuellar of the vein graft to the OM1 and RPDA. This anastomosis was carried in an end-to-side fashion with a running 8-0 Prolene suture. Before tying all sutures all 3 anastomotic lumens were probed to ensure there was no compromise of the anastomosis and the proximal and distal bulldogs were released and the anastomosis was de-aired by giving slowly antegrade cardioplegia. Systemic rewarming was commenced. Our attention was then turned to the LAD. The mid LAD was exposed and was found a 2.25mm target vessel of moderate quality. The LIMA was brought into the field through a generous hole into the left pericardium and the LIMA to LAD distal anastomosis was carried out with a running 8-0 Prolene suture. Before tying all sutures all 3 anastomotic lumens were probed to ensure there was no compromise of anastomosis. Hemostasis was checked by releasing the atraumatic bulldog from the LIMA.   The LIMA was tacked on the epicardium with interrupted 6-0 Prolene sutures. A hot shot  antegrade cardioplegia dose was administered, de airing maneuvers were carried out and the cross-clamp was removed. The heart started beating on its own accord; however, in sinus bradycardia. A bipolar ventricular pacing wire was placed on the diaphragmatic aspect of the right ventricle and the heart was paced at 80 beats per minute. After adequate time of rewarming and reperfusion, the patient was weaned off cardiopulmonary bypass with inotropic support. There was excellent Doppler flow on all bypass grafts. The heparin was reversed with protamine. All cardiopulmonary bypass circuit volume was returned to the patient and serial decannulation was carried out and all sutures were tied. Thorough hemostasis was achieved at the internal mammary bed, which was also treated with platelet poor gel. By this time went  the patient regained normal sinus rhythm and no more pacing was required. The pericardium was then re approximated with several interrupted sutures. The sternal edges were treated with platelet rich gel and bilateral intercostal parasternal nerve block was carried out with a mixture of Exparel and Marcaine after thorough hemostasis was ensured. A 24 [place] Ch[place] drain was placed in the left pleural space and a  24 [place] drain was placed in the anterior mediastinum with its tip into the right pleural space which was widely opened and suctioned out. The sternum was then reapproximated with stainless steel wires. The sternotomy incision was then irrigated and closed in between layers in the standard fashion. All needles, instruments and sponge counts were found to be correct.   The patient was brought into the intensive care unit in critical, but stable condition      Electronically signed by Jordon Hu MD on 5/25/2021 at 5:26 PM

## 2021-05-25 NOTE — PROGRESS NOTES
Pt verified information regarding surgery, name, birth date, surgeon, procedure and allergies: NKA. Patient transferred to [address] [place] [address] for surgery. Appropriate antibiotics ordered: vanco 1.5G, ancef 2G. Beta blocker: esmolol 5mg iv. Lab work within normal limits, abnormals reported to Dr. Laurent Carranza. 2 units of RBC's on call to OR, vital signs stable, Mepilex sacral border applied and 2% chlorhexidine gluconate skin prep given. Patient and family educated about surgery and pain management. Arterial line placed in right radial and TLC introducer in RIJ with PA line by Dr. Mathew Jordan. To surgery per bed at 0658.

## 2021-05-25 NOTE — BRIEF OP NOTE
Brief Postoperative Note      Patient: Armand Cardona  YOB: 1960  MRN: [id number]    Date of Procedure: 5/25/2021    Pre-Op Diagnosis: Severe 3V CAD    Post-Op Diagnosis: Same       Procedure(s):  URGENT CABG CORONARY ARTERY BYPASS GRAFTING X 4, CISNEROS GRAFT TO LAD X 1, SEQUENTIAL VEIN GRAFT TO OM1 AND RPDA, VEIN GRAFT TO DIAGONAL WITH PROXIMAL ANASTIMOSIS END TO SIDE TO VEIN GRAFT TO OM1 AND RPDA, LIGATION SANDRO WITH 50 MM ATRICLIP, EVH LEFT LEG, TOTAL CPB, REJI, DOPPLER BYPASS GRAFT FLOW VERIFICATION, INSERTION OF VENTRICULAR PACING WIRES, BILATERAL INTERCOSTAL NERVE BLOCK WITH EXPAREL AND MARCAINE  CYSTOSCOPY, URETHRAL DILATION, INSERTION OF URETHRAL CATHETER    Surgeon(s):  Brock Samaniego MD  [facility], MD    Assistant:   Assistant: [facility]  Physician Assistant: KYLE Rogel    Anesthesia: * No anesthesia type entered *    Estimated Blood Loss (mL): 189     Complications: None    Specimens:   * No specimens in log *    Implants:  Implant Name Type Inv. Item Serial No.  Lot No. LRB No. Used Action   DEVICE OCCL CLP L50MM SM FOOTPRINT 1 HND APPL SUTURELESS W/ - [id number]  DEVICE OCCL CLP L50MM SM FOOTPRINT 1 HND APPL SUTURELESS W/  [employer]WD [id number]  1 Implanted         Drains:   Chest Tube 2 Anterior Mediastinal;Pleural 24 Albanian (Active)       Urethral Catheter  16 fr (Active)       Findings: Findings: There was no pericardial adhesions or effusion, the ascending aorta was with very mild atheroma on the REJI but with actually very thick walls with friable mushy intima upon creating the proximal aortotomy for the aorto shaphenous anastomosis. For that reason it was elected the second proximal aorto saphnenous anastomosis to the diag to  be performed end to side on the cuellar of the vein graft to the Hen[place] and RPDA and not on the ascending aorta itself. There was no visible myocardial scar to suggest prior MI. The Diag was a 1.75mm target vessel of moderate quality. The OM1 was a 2.25mm target vessel of moderate quality. The RPDA was a 1.75mm target vessle of moderate quality. The mid LAD was a 2.25mm target vessel of moderate quality. The EF was 45% pre and 55% post procedure. The LIMA was of adequate size and quality and had excellent flow and was good to be used for by pass conduit. The vein was of adequate quality and caliber and good to be used for a conduit. There was no flow into the SANDRO at the end of the procedure. There was excellent doppler flow on all bypass grafts at the end of the procedure.       Electronically signed by Avila Story MD on 5/25/2021 at 12:05 PM

## 2021-05-25 NOTE — ANESTHESIA POSTPROCEDURE EVALUATION
Department of Anesthesiology  Postprocedure Note    Patient: Xi Sidhu  MRN: [id number]  YOB: 1960  Date of evaluation: 5/25/2021  Time:  2:00 PM     Procedure Summary     Date: 05/25/21 Room / Location: [place] [address] / [facility]    Anesthesia Start: 4084 Anesthesia Stop:     Procedures:       URGENT CABG CORONARY ARTERY BYPASS GRAFTING X 4, CISNEROS GRAFT TO LAD X 1, SEQUENTIAL VEIN GRAFT TO OM1 AND RPDA, VEIN GRAFT TO DIAGONAL WITH PROXIMAL ANASTIMOSIS END TO SIDE TO VEIN GRAFT TO OM1 AND RPDA, LIGATION SANDRO WITH 50 MM ATRICLIP, EVH LEFT LEG, TOTAL CPB, REJI, DOPPLER BYPASS GRAFT FLOW VERIFICATION, INSERTION OF VENTRICULAR PACING WIRES, BILATERAL INTERCOSTAL NERVE BLOCK WITH EXPAREL AND MARCAINE (N/A )      CYSTOSCOPY, URETHRAL DILATION, INSERTION OF URETHRAL CATHETER Diagnosis: (CORONARY ARTERY DISEASE)    Surgeons: Rodger Weaver MD; Mardel Closs, MD Responsible Provider: Shelbie Pompa MD    Anesthesia Type: general ASA Status: 4          Anesthesia Type: No value filed. [place] Phase I: Washington Score: 10    [place] Phase II:      Last vitals: Reviewed and per EMR flowsheets.        Anesthesia Post Evaluation    Patient location during evaluation: ICU  Patient participation: complete - patient cannot participate  Level of consciousness: sedated and ventilated  Airway patency: patent  Nausea & Vomiting: no nausea and no vomiting  Complications: no  Cardiovascular status: hypertensive  Respiratory status: acceptable  Hydration status: euvolemic

## 2021-05-25 NOTE — ANESTHESIA PROCEDURE NOTES
Aorta: Size normal.  Dissection not present. Aortic Arch:  Size normal.  Dissection not present. Descending Aorta:  Size normal.  Dissection not present. Atria    Right Atrium:  Size normal.  Spontaneous echo contrast not present. Thrombus not present. Tumor not present. Device not present. Left Atrium:  Size normal.  Spontaneous echo contrast not present. Thrombus not present. Tumor not present. Device not present.     Left atrial appendage normal.      Septa    Atrial Septum:  Intra-atrial septal morphology normal.      Ventricular Septum:  Intra-ventricular septum morphology normal.          Other Findings  Pericardium:  normal  Pleural Effusion:  none  Pulmonary Arteries:  normal  Pulmonary Venous Flow:  normal    Anesthesia Information  Performed Personally

## 2021-05-25 NOTE — ANESTHESIA PRE PROCEDURE
Department of Anesthesiology  Preprocedure Note       Name:  Sekou Young   Age:  59 y.o.  :  1960                                          MRN:  [id number]         Date:  2021      Surgeon: Tana Woody):  Brock Samaniego MD    Procedure: Procedure(s):  EGD ESOPHAGOGASTRODUODENOSCOPY    Medications prior to admission:   Prior to Admission medications    Medication Sig Start Date End Date Taking? Authorizing Provider   rosuvastatin (CRESTOR) 20 MG tablet Take 1 tablet by mouth nightly 21   Connie Alaniz MD   ferrous sulfate (IRON 325) 325 (65 Fe) MG tablet Take 1 tablet by mouth daily (with breakfast) 21   Connie Alaniz MD   ibuprofen (ADVIL;MOTRIN) 400 MG tablet Take 400 mg by mouth every 6 hours as needed for Pain    Historical Provider, MD   metoprolol succinate (TOPROL XL) 25 MG extended release tablet Take 1 tablet by mouth daily 5/10/21   Connie Alaniz MD   amLODIPine (NORVASC) 5 MG tablet TAKE ONE TABLET BY MOUTH DAILY 21   Connie Alaniz MD   tamsulosin (FLOMAX) 0.4 MG capsule TAKE TWO CAPSULES BY MOUTH DAILY 21   Lindsay Pryor MD   Blood Pressure Monitoring QUARTERMAIN) MISC Use as directed  Patient not taking: Reported on 2021   Connie Alaniz MD   lisinopril (PRINIVIL;ZESTRIL) 40 MG tablet Take 1 tablet by mouth daily 20   Connie Alaniz MD   metFORMIN (GLUCOPHAGE) 1000 MG tablet Take 1 tablet by mouth 2 times daily (with meals) 20   Connie Alaniz MD   glucose monitoring kit (FREESTYLE) monitoring kit 1 each by Does not apply route once for 1 dose 20  Connie Alaniz MD   Blood Pressure Monitoring QUARTERMAIN) MISC Use as directed  Patient not taking: Reported on 2021   Connie Alaniz MD   aspirin 81 MG tablet Take 81 mg by mouth daily    Historical Provider, MD   traMADol (ULTRAM) 50 MG tablet Take 50 mg by mouth every 6 hours as needed.     Historical Provider, MD Current medications:    No current facility-administered medications for this visit. No current outpatient medications on file.      Facility-Administered Medications Ordered in Other Visits   Medication Dose Route Frequency Provider Last Rate Last Admin    aminocaproic acid (AMICAR) 5,000 mg in sodium chloride 0.9 % 250 mL infusion bolus  5,000 mg Intravenous Once PRN Brock Samaniego MD        insulin regular (HUMULIN R;NOVOLIN R) 100 Units in sodium chloride 0.9 % 100 mL infusion  0.1 Units/kg/hr Intravenous Continuous PRN Brock Samaniego MD        norepinephrine (LEVOPHED) 16 mg in dextrose 5 % 250 mL infusion  2 mcg/min Intravenous Once PRN Brock Samaniego MD        phenylephrine (EFREM-SYNEPHRINE) 50 mg in dextrose 5 % 250 mL infusion  100 mcg/min Intravenous Once PRN Brock Samaniego MD        desmopressin (DDAVP) 16 mcg in sodium chloride 0.9 % 50 mL IVPB  16 mcg Intravenous Once Brock Samaniego MD        ceFAZolin (ANCEF) 2000 mg in dextrose 5 % 50 mL IVPB  2,000 mg Intravenous On Call to [address] [place] [address] MD Aden        vancomycin (VANCOCIN) 1,500 mg in dextrose 5 % 250 mL IVPB  1,500 mg Intravenous On Call to [address] [place] [address] MD Aden        esmolol (BREVIBLOC) injection 5 mg  5 mg Intravenous Once Brock Samaniego MD        lactated ringers infusion   Intravenous Continuous CHARLOTTE Sears CNP        chlorhexidine (HIBICLENS) 4 % liquid   Topical See Admin Instructions CHARLOTTE Sears CNP        levoFLOXacin (LEVAQUIN) 750 MG/150ML infusion 750 mg  750 mg Intravenous Q24H CHARLOTTE Sears CNP   Stopped at 05/24/21 2230    budesonide-formoterol (SYMBICORT) 80-4.5 MCG/ACT inhaler 2 puff  2 puff Inhalation BID CHARLOTTE Sears CNP   2 puff at 05/24/21 2056    lidocaine (XYLOCAINE) 2 % uro-jet   Topical Once CHARLOTTE Sears CNP        sodium chloride 0.9 % infusion             iron sucrose (VENOFER) 200 mg in sodium chloride 0.9 % 100 mL IVPB  200 mg Intravenous Q24H CHARLOTTE Garcia - CNP   Paused at 05/24/21 1632    rosuvastatin (CRESTOR) tablet 20 mg  20 mg Oral Nightly Garry Thomson MD   20 mg at 05/24/21 2230    traMADol (ULTRAM) tablet 50 mg  50 mg Oral Q6H PRN Garry Thomson MD   50 mg at 05/24/21 2230    amLODIPine (NORVASC) tablet 5 mg  5 mg Oral Daily Ethan Chun MD   5 mg at 05/24/21 5348    aspirin chewable tablet 81 mg  81 mg Oral Daily Ethan Chun MD   81 mg at 05/24/21 2953    metoprolol succinate (TOPROL XL) extended release tablet 25 mg  25 mg Oral Daily Ethan Chun MD   25 mg at 05/24/21 0858    tamsulosin (FLOMAX) capsule 0.4 mg  0.4 mg Oral Daily Ethan Chun MD   0.4 mg at 05/24/21 2230    sodium chloride flush 0.9 % injection 5-40 mL  5-40 mL Intravenous 2 times per day Ethan Chun MD   10 mL at 05/24/21 0858    sodium chloride flush 0.9 % injection 5-40 mL  5-40 mL Intravenous PRN Ethan Chun MD        0.9 % sodium chloride infusion  25 mL Intravenous PRN Ethan Chun MD        acetaminophen (TYLENOL) tablet 650 mg  650 mg Oral Q4H PRN Ethan Chun MD        perflutren lipid microspheres (DEFINITY) injection 1.65 mg  1.5 mL Intravenous ONCE PRN CHARLOTTE Garcia - CNP           Allergies:  No Known Allergies    Problem List:    Patient Active Problem List   Diagnosis Code    Chest pain R07.9    [facility]) [facility] I71.4    Type 2 diabetes mellitus with diabetic polyneuropathy, without long-term current use of insulin ([facility] E11.42    Essential hypertension I10    Hypercholesteremia E78.00    Former smoker Z87.891    Medical non-compliance Z91.19    Diverticulosis K57.90    Polyneuropathy associated with underlying disease ([facility] G63    Aneurysm of descending thoracic aorta ([facility] I71.2    Atherosclerosis of aorta ([address] I70.0    Persistent atrial fibrillation ([address] I48.19    Unstable 199 05/22/2021       CMP:   Lab Results   Component Value Date     05/22/2021    K 4.6 05/22/2021     05/22/2021    CO2 23 05/22/2021    BUN 14 05/22/2021    CREATININE 0.8 05/22/2021    GFRAA >60 05/22/2021    AGRATIO 1.7 05/13/2021    LABGLOM >60 05/22/2021    GLUCOSE 108 05/22/2021    PROT 6.2 05/13/2021    CALCIUM 9.2 05/22/2021    BILITOT <0.2 05/13/2021    ALKPHOS 61 05/13/2021    AST 14 05/13/2021    ALT 12 05/13/2021       POC Tests:   Recent Labs     05/24/21  0750   POCGLU 119*       Coags:   Lab Results   Component Value Date    PROTIME 12.5 05/22/2021    INR 1.08 05/22/2021    APTT 31.5 05/22/2021       HCG (If Applicable): No results found for: PREGTESTUR, PREGSERUM, HCG, HCGQUANT     ABGs:   Lab Results   Component Value Date    PHART 7.434 05/22/2021    PO2ART 95.3 05/22/2021    KZM1AKM 37.6 05/22/2021    FDU7WIF 25.2 05/22/2021    BEART 1.0 05/22/2021    K0SQHQLD 97.6 05/22/2021        Type & Screen (If Applicable):  No results found for: LABABO, [address]    Anesthesia Evaluation  Patient summary reviewed and Nursing notes reviewed no history of anesthetic complications:   Airway: Mallampati: II  TM distance: >3 FB   Neck ROM: full  Mouth opening: > = 3 FB Dental: normal exam         Pulmonary:normal exam                               Cardiovascular:  Exercise tolerance: good (>4 METS),   (+) hypertension:,     (-)  angina and  SAL      Rhythm: regular  Rate: normal           Beta Blocker:  Not on Beta Blocker         Neuro/Psych:   (+) neuromuscular disease:,             GI/Hepatic/Renal:             Endo/Other:    (+) DiabetesType II DM, , .                 Abdominal:           Vascular:                                          Anesthesia Plan      general     ASA 4       Induction: intravenous. arterial line, central line, PA catheter, REJI and CVP    Anesthetic plan and risks discussed with patient.                       Irais Ruiz MD   5/25/2021

## 2021-05-25 NOTE — FLOWSHEET NOTE
Pt to CVOR preop area with RN. All preop meds given and sent with pt. Pt voided per bathroom. Discussed last pm with Dr Ganesh Palmer, Dr Kindra Ramirez will be here this am 0700 to place arana catheter.

## 2021-05-25 NOTE — PROGRESS NOTES
Cardiothoracic Surgery Progress Note    CC:  Vidal, 3 vessel CAD    Subjective:  Hemodynamically stable, RA, afebrile. Alert and oriented X 3. Denying any complaints of chest pain or shortness of breath. Vital Signs:   Vitals:    05/24/21 0749   BP: (!) 122/49   Pulse: 77   Resp: 17   Temp: 97.7 °F (36.5 °C)   SpO2: 95%      Physical Exam:   Cardiac:  NSR  Lungs: clear to auscultation  Abdomen:  NA X 4  Vascular:  pulses all palpable   Extremities: none  :  Voiding    Labs:   CBC:   No results for input(s): WBC, HGB, HCT, PLT in the last 72 hours. BMP:   No results for input(s): K, CREATININE, CALCIUM, MG in the last 72 hours.     Invalid input(s): KRFLXMG    Assessment/Plan:  As per CC:     Plan:   Pre-op testing reviewed, UTI started on Abx  H/o Ureteral strictures, unable to place arana catheter last night, urology to place arana in the OR  Will hold ACE  For CABG/SANDRO today   all questions answered

## 2021-05-25 NOTE — OP NOTE
Urology Operative Report  [facility]    Provider: Rosalina Danielle MD MD Patient ID:  Admission Date: 2021 Name: Sonny Gonzalez  OR Date: 2021  MRN: [id number]   Patient Location: CVOR/NONE : 1960  Attending: Abdirahman Brooks MD Date of Service: 2021  PCP: Savage Sibley MD     Date of Operation: 2021    Preoperative Diagnosis: difficulty with placing a Arana catheter    Postoperative Diagnosis: urethral stricture    Procedure:    1. Cystoscopy  2. Urethral dilation  3. Difficult Arana catheter placement    Surgeon:   Rosalina Danielle MD    Anesthesia: General endotracheal anesthesia    Indications: Sonny Gonzalez is a 59 y.o. male who presents for the above named surgery. Informed consent was obtained and the risks, benefits, and details of the procedure were explained to the patient who elected to proceed. Details of Procedure: The patient was brought to the operating room and placed in the supine position on the operating room table. SCDs were placed on the lower extremities. Following induction of anesthesia the patient was positioned in a supine position, all pressure points were padded, and the genitals were prepped and draped in the usual sterile fashion. A routine timeout was performed, confirming the patient, procedure, site, risk of fire, patient allergies and confirming that preoperative antibiotics had been administered prior to beginning. We attempted to place a 16 Fr arana but met resistance ~5 cm in the penile urethra. A cystoscope was advanced into the urethra and at this level we noted a pinpoint urethral stricture. We placed a sensor wire was placed under vision through this true lumen. The scope was removed. Over the wire, we used the S-curve urethral dilators to dilate to 20 Fr. This stricture was quite rigid, I suspect a move involve pan urethral stricture based on feel.  At this time a 16 Fr counciltip Arana catheter was placed into the bladder and the wire was removed. The balloon was filled with fluid and the bladder fluid was monitored for hematuria. At the end of the procedure all counts were correct. The CV team then took over for the CABG which will be dictated separately. Findings: high grade penile urethral stricture dilated, suspect move involve yaritzay pan urethral stricture     Estimated Blood Loss: none                  Drains: 16 Fr counciltip          Specimens: none    Complications: none apparent           Disposition:  In OR for CV team, Keep arana for 3 days.  Call Urology for follow up to discuss urinary symptoms moving forward    Gladis Omalley MD, MD  5/25/2021

## 2021-05-25 NOTE — PROGRESS NOTES
Lab called to check the status of patients post op labs stated that no order was in when lab sent down and running them now.  Home Bertrand RN

## 2021-05-25 NOTE — CONSULTS
[facility] Consult Note  [facility]    Provider: Neli Velarde MD MD Patient ID:  Admission Date: 2021 Name: Richard Barron  OR Date: n/a MRN: [id number]   Patient Location: CVOR/NONE : 1960  Attending: Ethan Chun MD Date of Service: 2021  PCP: Hanane Carter MD     Diagnoses:  Urethral stricture     Assessment/Plan:  58 yo M with planned CABG, consulted for difficult arana. Per nursing, history of urethral stricture in the past, voiding spontaneously prior to surgery. See full operative note for details. Now s/p cystoscopy with urethral dilation. Urethral stricture began 5 cm from meatus and I suspect pan urethral given difficulty of dilation and ultimately placement of 16 Fr counciltip arana    - cystoscopy with U-Dil in OR for return of clear yellow urine  - keep arana for 3 days   - will need  follow up as stricture high rate of recurrence- requested  - call with questions    All the patients questions were answered in detail. He understands the plan as listed above. · Review/order of labs  · Review/order of radiology studies  · discussion with referring MD  · Independent review and interpretation of test or study   Total time spent face-to-face with the patient 20 min, including greater than 50% of this time in discussion with the patient/family concerning the following:  · Recommended tests  · management options  · risks/benefits of management options  · importance of compliance  · Prognosis  · Risk factor reduction  · Patient/family education                                                                                                                                                      CC: difficulty placing arana    HPI: Richard Barron is a 59 y.o. male. I saw the patient today for difficulty placing arana prior to CABG, and associated symptoms of UTI, that have been present for since admission.  Symptoms relieved by urethral dilation and aggravated by inability to place arana. He has tried the following treatments: attempts on the floor last night. Past medical History:   He has a past medical history of AAA (abdominal aortic aneurysm) ([facility] (11/28/2015), Aneurysm of descending thoracic aorta ([facility] (03/28/2018), Arthritis, Atherosclerosis of aorta ([facility] (03/28/2018), Back pain, Diabetes mellitus ([facility] Diverticulosis, Elevated PSA (10/19/2017), Hyperlipidemia, Hypertension, and Polyneuropathy associated with underlying disease ([facility] (10/19/2017). Past Surgical History:  He has a past surgical history that includes Upper gastrointestinal endoscopy (11/30/15); Urethra dilation (5/2017, 7/2017); Cardiac catheterization; and Upper gastrointestinal endoscopy (N/A, 10/12/2018). Allergies:   No Known Allergies    Social History:  He reports that he quit smoking about 11 years ago. His smoking use included cigarettes. He has a 20.00 pack-year smoking history. He has never used smokeless tobacco. He reports that he does not drink alcohol and does not use drugs. Family History:  family history is not on file.     Medications:   Scheduled Meds:   desmopressin (DDAVP) IVPB  16 mcg Intravenous Once    ceFAZolin (ANCEF) IVPB  2,000 mg Intravenous On Call to OR    vancomycin  1,500 mg Intravenous On Call to OR    chlorhexidine   Topical See Admin Instructions    levofloxacin  750 mg Intravenous Q24H    budesonide-formoterol  2 puff Inhalation BID    lidocaine   Topical Once    iron sucrose (VENOFER) iv piggyback 100 mL (Admin over 60 minutes)  200 mg Intravenous Q24H    rosuvastatin  20 mg Oral Nightly    amLODIPine  5 mg Oral Daily    aspirin  81 mg Oral Daily    metoprolol succinate  25 mg Oral Daily    tamsulosin  0.4 mg Oral Daily    sodium chloride flush  5-40 mL Intravenous 2 times per day     Continuous Infusions:   insulin (HUMAN R) weight based infusion      norepinephrine (LEVOPHED) infusion      phenylephrine (EFREM-SYNEPHRINE) 50mg/250mL infusion

## 2021-05-25 NOTE — PROGRESS NOTES
Dr collins at bedside pt shivering requested demerol from pharmacy. Requested one unit of blood from blood bank per dr collins request to give one unit slowly.  Patient placed back on SMIV fio of 50 peep of 5

## 2021-05-25 NOTE — DISCHARGE INSTR - COC
Continuity of Care Form    Patient Name: Marylin King   :  1960  MRN:  [id number]    Admit date:  2021  Discharge date:  2021    Code Status Order: Full Code   Advance Directives:   [facility] Documentation       Date/Time Healthcare Directive Type of Healthcare Directive Copy in [facility] [address] Agent's Name Healthcare Agent's Phone Number    21 7805  No, patient does not have an advance directive for healthcare treatment -- -- -- -- --    21 1045  No, patient does not have an advance directive for healthcare treatment -- -- -- -- --            Admitting Physician:  Suly Stevens MD  PCP: Nayeli Duncan MD    Discharging Nurse: Justin Bazan Lutts Unit/Room#: JANNY/ [address] Unit Phone Number: [phone]    Emergency Contact:   Extended Emergency Contact Information  Primary Emergency Contact: Mitali Brown  Address: [address] Phone: [phone]  Mobile Phone: [phone]  Relation: Spouse    Past Surgical History:  Past Surgical History:   Procedure Laterality Date    CARDIAC CATHETERIZATION      Patient staes normal    UPPER GASTROINTESTINAL ENDOSCOPY  11/30/15    with gastric biopsy    UPPER GASTROINTESTINAL ENDOSCOPY N/A 10/12/2018    [facility] 2017    Dr. Wei Otero       Immunization History:   Immunization History   Administered Date(s) Administered    Pneumococcal Polysaccharide ([id number]) 2017    Tdap (Boostrix, Adacel) 2017       Active Problems:  Patient Active Problem List   Diagnosis Code    Chest pain R07.9    AAA (abdominal aortic aneurysm) ([facility] I71.4    Type 2 diabetes mellitus with diabetic polyneuropathy, without long-term current use of insulin ([facility] E11.42    Essential hypertension I10    Hypercholesteremia E78.00    Former smoker Z87.891    Medical non-compliance Z91.19    Diverticulosis K57.90    Polyneuropathy associated with underlying disease ([facility] G63    Aneurysm of descending thoracic aorta (HCC) I71.2    Atherosclerosis of aorta (HCC) I70.0    Persistent atrial fibrillation (HCC) I48.19    Unstable angina (HCC) I20.0       Isolation/Infection:   Isolation            No Isolation          Patient Infection Status       None to display            Nurse Assessment:  Last Vital Signs: BP (!) 149/67   Pulse 102   Temp 100.2 °F (37.9 °C) (Core)   Resp 14   Ht 5' 9\" (1.753 m)   Wt 186 lb 11.7 oz (84.7 kg)   SpO2 100%   BMI 27.58 kg/m²     Last documented pain score (0-10 scale): Pain Level: 0  Last Weight:   Wt Readings from Last 1 Encounters:   05/25/21 186 lb 11.7 oz (84.7 kg)     Mental Status:  oriented, alert, coherent, logical, thought processes intact and able to concentrate and follow conversation    IV Access:  - None    Nursing Mobility/ADLs:  Walking   Independent  Transfer  Independent  Bathing  Independent  Dressing  Independent  [address] Delivery   whole    Wound Care Documentation and Therapy:        Elimination:  Continence:   · Bowel: Yes  · Bladder: Yes  Urinary Catheter: None   Colostomy/Ileostomy/Ileal Conduit: No       Date of Last BM: 6-1-21    Intake/Output Summary (Last 24 hours) at 5/25/2021 1459  Last data filed at 5/25/2021 1400  Gross per 24 hour   Intake 220.88 ml   Output 885 ml   Net -664.12 ml     I/O last 3 completed shifts: In: 220.9 [IV Piggyback:220.9]  Out: -     Safety Concerns:      At Risk for Falls    Impairments/Disabilities:      None    Nutrition Therapy:  Current Nutrition Therapy:   - Oral Nutrition Supplement:  None    Routes of Feeding: Oral  Liquids: No Restrictions  Daily Fluid Restriction: yes - amount 2000ML  Last Modified Barium Swallow with Video (Video Swallowing Test): not done    Treatments at the Time of Hospital Discharge:   Respiratory Treatments: ***  Oxygen Therapy:  is not on home oxygen therapy. Ventilator:    - No ventilator support    Rehab Therapies: PT/OT  Weight Bearing Status/Restrictions: No weight bearing restirctions  Other Medical Equipment (for information only, NOT a DME order):  NONE  Other Treatments: vital signs, assess incisions, weight     Patient's personal belongings (please select all that are sent with patient):  Glasses    RN SIGNATURE:  Electronically signed by Cira Starr RN on 6/2/21 at 11:46 AM EDT    CASE MANAGEMENT/SOCIAL WORK SECTION    Inpatient Status Date: 5/21/2021    Readmission Risk Assessment Score:  Readmission Risk              Risk of Unplanned Readmission:  12           Discharging to Facility/ Agency    Name: Nya Mahan PHONE; [phone]  · FAX: [phone]      Dialysis Facility (if applicable)   · Name:  · Address:  · Dialysis Schedule:  · Phone:  · Fax:    / signature: CADEN Rhodes, CCM, RN  [facility]  [phone]    PHYSICIAN SECTION    Prognosis: Good    Condition at Discharge: Stable    Rehab Potential (if transferring to Rehab): Good    Recommended Labs or Other Treatments After Discharge:   PT/INR to be drawn on 6/3/2021. Results to be called to CVTS office at [phone] for further PT/INR /coumadin dosing and instructions     Based on the patient's AM-PAC score and their current functional mobility deficits, it is recommended that the patient have 2-3 sessions per week of Physical Therapy at d/c to increase the patient's independence. Based on the patient's AM-PAC score and their current functional mobility deficits, it is recommended that the patient have 2-3 sessions per week of Occupational Therapy at d/c to increase the patient's independence.     Physician Certification: I certify the above information and transfer of Lan Kin  is necessary for the

## 2021-05-26 ENCOUNTER — APPOINTMENT (OUTPATIENT)
Dept: GENERAL RADIOLOGY | Age: 61
DRG: 234 | End: 2021-05-26
Attending: INTERNAL MEDICINE
Payer: COMMERCIAL

## 2021-05-26 LAB
ANION GAP SERPL CALCULATED.3IONS-SCNC: 6 MMOL/L (ref 3–16)
BUN BLDV-MCNC: 12 MG/DL (ref 7–20)
CALCIUM IONIZED: 1.1 MMOL/L (ref 1.12–1.32)
CALCIUM SERPL-MCNC: 8.8 MG/DL (ref 8.3–10.6)
CHLORIDE BLD-SCNC: 109 MMOL/L (ref 99–110)
CO2: 22 MMOL/L (ref 21–32)
CREAT SERPL-MCNC: 1 MG/DL (ref 0.8–1.3)
GFR AFRICAN AMERICAN: >60
GFR NON-AFRICAN AMERICAN: >60
GLUCOSE BLD-MCNC: 102 MG/DL (ref 70–99)
GLUCOSE BLD-MCNC: 106 MG/DL (ref 70–99)
GLUCOSE BLD-MCNC: 107 MG/DL (ref 70–99)
GLUCOSE BLD-MCNC: 107 MG/DL (ref 70–99)
GLUCOSE BLD-MCNC: 108 MG/DL (ref 70–99)
GLUCOSE BLD-MCNC: 111 MG/DL (ref 70–99)
GLUCOSE BLD-MCNC: 112 MG/DL (ref 70–99)
GLUCOSE BLD-MCNC: 116 MG/DL (ref 70–99)
GLUCOSE BLD-MCNC: 124 MG/DL (ref 70–99)
GLUCOSE BLD-MCNC: 132 MG/DL (ref 70–99)
GLUCOSE BLD-MCNC: 133 MG/DL (ref 70–99)
GLUCOSE BLD-MCNC: 137 MG/DL (ref 70–99)
GLUCOSE BLD-MCNC: 146 MG/DL (ref 70–99)
GLUCOSE BLD-MCNC: 182 MG/DL (ref 70–99)
GLUCOSE BLD-MCNC: 78 MG/DL (ref 70–99)
GLUCOSE BLD-MCNC: 97 MG/DL (ref 70–99)
GLUCOSE BLD-MCNC: 97 MG/DL (ref 70–99)
GLUCOSE BLD-MCNC: 99 MG/DL (ref 70–99)
HCT VFR BLD CALC: 28.4 % (ref 40.5–52.5)
HEMOGLOBIN: 9.2 G/DL (ref 13.5–17.5)
MAGNESIUM: 2.1 MG/DL (ref 1.8–2.4)
MCH RBC QN AUTO: 23.8 PG (ref 26–34)
MCHC RBC AUTO-ENTMCNC: 32.4 G/DL (ref 31–36)
MCV RBC AUTO: 73.2 FL (ref 80–100)
ORGANISM: ABNORMAL
PDW BLD-RTO: 18.2 % (ref 12.4–15.4)
PERFORMED ON: ABNORMAL
PERFORMED ON: NORMAL
PH VENOUS: 7.45 (ref 7.35–7.45)
PLATELET # BLD: 131 K/UL (ref 135–450)
PMV BLD AUTO: 10.1 FL (ref 5–10.5)
POTASSIUM SERPL-SCNC: 5 MMOL/L (ref 3.5–5.1)
RBC # BLD: 3.89 M/UL (ref 4.2–5.9)
SODIUM BLD-SCNC: 137 MMOL/L (ref 136–145)
URINE CULTURE, ROUTINE: ABNORMAL
WBC # BLD: 10 K/UL (ref 4–11)

## 2021-05-26 PROCEDURE — APPSS15 APP SPLIT SHARED TIME 0-15 MINUTES: Performed by: NURSE PRACTITIONER

## 2021-05-26 PROCEDURE — 99232 SBSQ HOSP IP/OBS MODERATE 35: CPT | Performed by: NURSE PRACTITIONER

## 2021-05-26 PROCEDURE — 2580000003 HC RX 258: Performed by: INTERNAL MEDICINE

## 2021-05-26 PROCEDURE — 2500000003 HC RX 250 WO HCPCS: Performed by: THORACIC SURGERY (CARDIOTHORACIC VASCULAR SURGERY)

## 2021-05-26 PROCEDURE — 6360000002 HC RX W HCPCS: Performed by: NURSE PRACTITIONER

## 2021-05-26 PROCEDURE — 6360000002 HC RX W HCPCS: Performed by: THORACIC SURGERY (CARDIOTHORACIC VASCULAR SURGERY)

## 2021-05-26 PROCEDURE — 6370000000 HC RX 637 (ALT 250 FOR IP): Performed by: INTERNAL MEDICINE

## 2021-05-26 PROCEDURE — 94669 MECHANICAL CHEST WALL OSCILL: CPT

## 2021-05-26 PROCEDURE — 82330 ASSAY OF CALCIUM: CPT

## 2021-05-26 PROCEDURE — 80048 BASIC METABOLIC PNL TOTAL CA: CPT

## 2021-05-26 PROCEDURE — 85027 COMPLETE CBC AUTOMATED: CPT

## 2021-05-26 PROCEDURE — 83735 ASSAY OF MAGNESIUM: CPT

## 2021-05-26 PROCEDURE — 94761 N-INVAS EAR/PLS OXIMETRY MLT: CPT

## 2021-05-26 PROCEDURE — 2580000003 HC RX 258: Performed by: THORACIC SURGERY (CARDIOTHORACIC VASCULAR SURGERY)

## 2021-05-26 PROCEDURE — 2000000000 HC ICU R&B

## 2021-05-26 PROCEDURE — 94640 AIRWAY INHALATION TREATMENT: CPT

## 2021-05-26 PROCEDURE — 2700000000 HC OXYGEN THERAPY PER DAY

## 2021-05-26 PROCEDURE — 71045 X-RAY EXAM CHEST 1 VIEW: CPT

## 2021-05-26 PROCEDURE — APPNB30 APP NON BILLABLE TIME 0-30 MINS: Performed by: NURSE PRACTITIONER

## 2021-05-26 PROCEDURE — 6370000000 HC RX 637 (ALT 250 FOR IP): Performed by: THORACIC SURGERY (CARDIOTHORACIC VASCULAR SURGERY)

## 2021-05-26 RX ADMIN — MUPIROCIN: 20 OINTMENT TOPICAL at 13:10

## 2021-05-26 RX ADMIN — CEFAZOLIN 2000 MG: 10 INJECTION, POWDER, FOR SOLUTION INTRAVENOUS at 19:56

## 2021-05-26 RX ADMIN — ATORVASTATIN CALCIUM 40 MG: 40 TABLET, FILM COATED ORAL at 20:22

## 2021-05-26 RX ADMIN — STANDARDIZED SENNA CONCENTRATE AND DOCUSATE SODIUM 1 TABLET: 8.6; 5 TABLET ORAL at 09:33

## 2021-05-26 RX ADMIN — ACETAMINOPHEN 1000 MG: 500 TABLET ORAL at 01:06

## 2021-05-26 RX ADMIN — Medication 400 MG: at 20:22

## 2021-05-26 RX ADMIN — METOPROLOL TARTRATE 12.5 MG: 25 TABLET, FILM COATED ORAL at 20:22

## 2021-05-26 RX ADMIN — OXYCODONE 10 MG: 5 TABLET ORAL at 05:39

## 2021-05-26 RX ADMIN — FONDAPARINUX SODIUM 2.5 MG: 2.5 INJECTION SUBCUTANEOUS at 08:22

## 2021-05-26 RX ADMIN — ALBUTEROL SULFATE 2.5 MG: 2.5 SOLUTION RESPIRATORY (INHALATION) at 12:54

## 2021-05-26 RX ADMIN — OXYCODONE 10 MG: 5 TABLET ORAL at 17:36

## 2021-05-26 RX ADMIN — Medication 2 PUFF: at 09:03

## 2021-05-26 RX ADMIN — Medication 10 ML: at 20:23

## 2021-05-26 RX ADMIN — MUPIROCIN: 20 OINTMENT TOPICAL at 20:28

## 2021-05-26 RX ADMIN — PANTOPRAZOLE SODIUM 40 MG: 40 TABLET, DELAYED RELEASE ORAL at 08:06

## 2021-05-26 RX ADMIN — CEFAZOLIN 2000 MG: 10 INJECTION, POWDER, FOR SOLUTION INTRAVENOUS at 03:06

## 2021-05-26 RX ADMIN — ACETAMINOPHEN 1000 MG: 500 TABLET ORAL at 20:23

## 2021-05-26 RX ADMIN — OXYCODONE 10 MG: 5 TABLET ORAL at 21:57

## 2021-05-26 RX ADMIN — ALBUTEROL SULFATE 2.5 MG: 2.5 SOLUTION RESPIRATORY (INHALATION) at 09:03

## 2021-05-26 RX ADMIN — CEFAZOLIN 2000 MG: 10 INJECTION, POWDER, FOR SOLUTION INTRAVENOUS at 11:12

## 2021-05-26 RX ADMIN — STANDARDIZED SENNA CONCENTRATE AND DOCUSATE SODIUM 1 TABLET: 8.6; 5 TABLET ORAL at 20:22

## 2021-05-26 RX ADMIN — ACETAMINOPHEN 1000 MG: 500 TABLET ORAL at 11:58

## 2021-05-26 RX ADMIN — ACETAMINOPHEN 1000 MG: 500 TABLET ORAL at 05:39

## 2021-05-26 RX ADMIN — LEVOFLOXACIN 750 MG: 5 INJECTION, SOLUTION INTRAVENOUS at 20:31

## 2021-05-26 RX ADMIN — Medication 400 MG: at 09:34

## 2021-05-26 RX ADMIN — FUROSEMIDE 20 MG: 10 INJECTION, SOLUTION INTRAMUSCULAR; INTRAVENOUS at 17:38

## 2021-05-26 RX ADMIN — VANCOMYCIN HYDROCHLORIDE 1500 MG: 10 INJECTION, POWDER, LYOPHILIZED, FOR SOLUTION INTRAVENOUS at 05:07

## 2021-05-26 RX ADMIN — OXYCODONE 5 MG: 5 TABLET ORAL at 01:06

## 2021-05-26 RX ADMIN — ASPIRIN 325 MG: 325 TABLET, COATED ORAL at 09:33

## 2021-05-26 RX ADMIN — OXYCODONE 10 MG: 5 TABLET ORAL at 09:32

## 2021-05-26 RX ADMIN — FUROSEMIDE 20 MG: 10 INJECTION, SOLUTION INTRAMUSCULAR; INTRAVENOUS at 08:05

## 2021-05-26 RX ADMIN — CALCIUM CHLORIDE 1000 MG: 100 INJECTION, SOLUTION INTRAVENOUS at 01:30

## 2021-05-26 RX ADMIN — Medication 10 ML: at 20:24

## 2021-05-26 RX ADMIN — TRAMADOL HYDROCHLORIDE 50 MG: 50 TABLET, FILM COATED ORAL at 16:30

## 2021-05-26 RX ADMIN — INSULIN GLARGINE 13 UNITS: 100 INJECTION, SOLUTION SUBCUTANEOUS at 20:28

## 2021-05-26 RX ADMIN — VANCOMYCIN HYDROCHLORIDE 1500 MG: 10 INJECTION, POWDER, LYOPHILIZED, FOR SOLUTION INTRAVENOUS at 17:52

## 2021-05-26 RX ADMIN — METOPROLOL TARTRATE 12.5 MG: 25 TABLET, FILM COATED ORAL at 08:06

## 2021-05-26 RX ADMIN — TAMSULOSIN HYDROCHLORIDE 0.4 MG: 0.4 CAPSULE ORAL at 20:23

## 2021-05-26 RX ADMIN — OXYCODONE 10 MG: 5 TABLET ORAL at 13:28

## 2021-05-26 RX ADMIN — ALBUTEROL SULFATE 2.5 MG: 2.5 SOLUTION RESPIRATORY (INHALATION) at 20:00

## 2021-05-26 RX ADMIN — Medication 2 PUFF: at 20:00

## 2021-05-26 ASSESSMENT — PAIN SCALES - GENERAL
PAINLEVEL_OUTOF10: 8
PAINLEVEL_OUTOF10: 9
PAINLEVEL_OUTOF10: 9
PAINLEVEL_OUTOF10: 0
PAINLEVEL_OUTOF10: 5
PAINLEVEL_OUTOF10: 9
PAINLEVEL_OUTOF10: 0
PAINLEVEL_OUTOF10: 6
PAINLEVEL_OUTOF10: 10
PAINLEVEL_OUTOF10: 2
PAINLEVEL_OUTOF10: 0
PAINLEVEL_OUTOF10: 10

## 2021-05-26 ASSESSMENT — PAIN DESCRIPTION - LOCATION
LOCATION: CHEST

## 2021-05-26 ASSESSMENT — PAIN DESCRIPTION - ORIENTATION
ORIENTATION: LEFT

## 2021-05-26 ASSESSMENT — PAIN DESCRIPTION - PAIN TYPE
TYPE: SURGICAL PAIN

## 2021-05-26 NOTE — PROGRESS NOTES
Patient awake and following commands. Patient placed on CPAP per open heart protocol. ABG drawn 60 minutes later and WNL. NIF - 30. Patient suctioned and extubated to 5 liters nasal cannula. Patient tolerated well. Oxygen saturation 92. Patient alert and oriented X 3.

## 2021-05-26 NOTE — FLOWSHEET NOTE
Chest tubes meet criteria to remove per open heart protocol. No  air leak. no crepitus. Pt instructed on procedure. Pt premedicated with Tramadol. Site cleansed and prepped per protocol. Chest tubes X 2 removed without difficulty. Dry sterile dressing applied. Bilateral breath sounds audible. O2 Sats 95 on 2 nasal cannula.    ncision site within normal limits. Patient tolerated well.

## 2021-05-26 NOTE — PROGRESS NOTES
[address]   Cardiology Progress Note     Date: 5/26/2021  Admit Date: 5/21/2021     Chief Complaint: No chief complaint on file. History of Present Illness: History obtained from patient and medical record. Anusha Whitney is a 59 y.o. male with a past medical history of AAA, hypertension, hyperlipidemia. Presented for outpatient left heart cath due to complaints of typical chest pain. LHC revealed multivessel disease. Underwent CABG x4, SANDRO ligation 5/25/2021    Interval Hx: Underwent CABG yesterday. Today is POD #1. Extubated last night. CT out. Has been up to chair and walked. Wt up 15lbs. Diuresing per CTS. Patient seen and examined. Clinical notes reviewed. Telemetry reviewed. No new complaints today. No major events overnight. Denies having chest pain, palpitations, shortness of breath, orthopnea/PND, cough, or dizziness at the time of this visit. Past Medical History:  Past Medical History:   Diagnosis Date    AAA (abdominal aortic aneurysm) ([address] 11/28/2015    4.3 cm 9/2019. yearly CT    Aneurysm of descending thoracic aorta ([address] 03/28/2018    2018: 3 cm    Arthritis     Atherosclerosis of aorta ([address] 03/28/2018    extensive per CT 2018    Back pain     Diabetes mellitus ([address]     Diverticulosis     Elevated PSA 10/19/2017    Hyperlipidemia     Hypertension     Polyneuropathy associated with underlying disease ([address] 10/19/2017        Past Surgical History:    has a past surgical history that includes Upper gastrointestinal endoscopy (11/30/15); Urethra dilation (5/2017, 7/2017); Cardiac catheterization; Upper gastrointestinal endoscopy (N/A, 10/12/2018); Coronary artery bypass graft (N/A, 5/25/2021); and Cystoscopy (5/25/2021). Social History:  Reviewed. reports that he quit smoking about 11 years ago. His smoking use included cigarettes. He has a 20.00 pack-year smoking history.  He has never used smokeless tobacco. He reports that he does not drink alcohol and does not use drugs. Allergies:  No Known Allergies    Family History:  Reviewed. family history is not on file. Denies family history of sudden cardiac death, arrhythmia, premature CAD    Home Meds:  Prior to Visit Medications    Medication Sig Taking? Authorizing Provider   ferrous sulfate (IRON 325) 325 (65 Fe) MG tablet Take 1 tablet by mouth daily (with breakfast) Yes Elton Guzman MD   metoprolol succinate (TOPROL XL) 25 MG extended release tablet Take 1 tablet by mouth daily Yes Elton Guzman MD   amLODIPine (NORVASC) 5 MG tablet TAKE ONE TABLET BY MOUTH DAILY Yes Elton Guzman MD   tamsulosin (FLOMAX) 0.4 MG capsule TAKE TWO CAPSULES BY MOUTH DAILY Yes Marimar Centeno MD   lisinopril (PRINIVIL;ZESTRIL) 40 MG tablet Take 1 tablet by mouth daily Yes Elton Guzman MD   metFORMIN (GLUCOPHAGE) 1000 MG tablet Take 1 tablet by mouth 2 times daily (with meals) Yes Elton Guzman MD   aspirin 81 MG tablet Take 81 mg by mouth daily Yes Historical Provider, MD   rosuvastatin (CRESTOR) 20 MG tablet Take 1 tablet by mouth nightly  Elton Guzman MD   ibuprofen (ADVIL;MOTRIN) 400 MG tablet Take 400 mg by mouth every 6 hours as needed for Pain  Historical Provider, MD   Blood Pressure Monitoring QUARTERMAIN) MISC Use as directed  Patient not taking: Reported on 5/13/2021  Elton Guzman MD   glucose monitoring kit (FREESTYLE) monitoring kit 1 each by Does not apply route once for 1 dose  Elton Guzman MD   Blood Pressure Monitoring QUARTERMAIN) MISC Use as directed  Patient not taking: Reported on 5/13/2021  Elton Guzman MD   traMADol (ULTRAM) 50 MG tablet Take 50 mg by mouth every 6 hours as needed.   Historical Provider, MD        Scheduled Meds:   desmopressin (DDAVP) IVPB  16 mcg Intravenous Once    sodium chloride flush  10 mL Intravenous 2 times per day    fondaparinux  2.5 mg Subcutaneous Daily    acetaminophen  1,000 mg Oral Q6H    furosemide  20 mg Intravenous BID    magnesium oxide  400 mg Oral BID    mupirocin   Nasal BID    [Held by provider] potassium chloride  10 mEq Oral TID     sennosides-docusate sodium  1 tablet Oral BID    metoprolol tartrate  12.5 mg Oral BID    [START ON 5/29/2021] lisinopril  2.5 mg Oral Lunch    atorvastatin  40 mg Oral Nightly    pantoprazole  40 mg Oral Daily    ceFAZolin (ANCEF) IVPB  2,000 mg Intravenous Q8H    vancomycin (VANCOCIN) IV  1,500 mg Intravenous Q12H    albuterol  2.5 mg Nebulization Q4H WA    insulin glargine  0.15 Units/kg Subcutaneous Nightly    aspirin  325 mg Oral Daily    levofloxacin  750 mg Intravenous Q24H    budesonide-formoterol  2 puff Inhalation BID    tamsulosin  0.4 mg Oral Daily    sodium chloride flush  5-40 mL Intravenous 2 times per day     Continuous Infusions:   insulin (HUMAN R) weight based infusion      sodium chloride      insulin 2.52 Units/hr (05/26/21 1409)    dextrose       PRN Meds:insulin (HUMAN R) weight based infusion, sodium chloride flush, ondansetron, oxyCODONE **OR** oxyCODONE, fentanNYL, hydrALAZINE, metoprolol, diphenhydrAMINE, polyethylene glycol, magnesium hydroxide, potassium chloride, magnesium sulfate, calcium chloride IVPB, calcium chloride IVPB, albuterol sulfate HFA, sodium chloride, glucose, dextrose, glucagon (rDNA), dextrose, traMADol     Review of Systems:  · Constitutional: Negative for fever, night sweats, chills,  · Skin: Negative for rash, pruritus, bleeding, or bruising    · HEENT: Negative for vision changes, ringing in the ears, dysphagia  · Respiratory: Reviewed in HPI  · Cardiovascular: Reviewed in HPI  · Gastrointestinal: Negative for abdominal pain, N/V/D, constipation, or black/tarry stools  · Genito-Urinary: Negative for dysuria, incontinence, or hematuria  · Musculoskeletal: Negative for joint swelling, muscle pain, or injuries  · Neurological/Psych: Negative for confusion, seizures, headaches, or TIA-like symptoms.  No 131*     Thyroid:   Lab Results   Component Value Date    TSH 1.360 2019     Lipids:   Lab Results   Component Value Date    CHOL 177 2021    HDL 42 2021    TRIG 109 2021     LFTS:   Lab Results   Component Value Date    ALT 12 2021    AST 14 2021    ALKPHOS 61 2021    PROT 6.2 2021    AGRATIO 1.7 2021    BILITOT <0.2 2021     Cardiac Enzymes:   Lab Results   Component Value Date    TROPONINI <0.01 2015    TROPONINI <0.01 2015    TROPONINI <0.01 10/06/2013    TROPONINI <0.01 10/05/2013    TROPONINI <0.01 10/05/2013    TROPONINI 0.01 10/05/2013    TROPONINI 0.01 10/05/2013     Coags:   Lab Results   Component Value Date    PROTIME 15.2 2021    INR 1.31 2021     EC21  Normal sinus rhythm  Minimal voltage criteria for LVH, may be normal variant  Nonspecific ST abnormality  Abnormal ECG    ECHO: 21   Summary   Overall left ventricular function is normal.   Ejection fraction is visually estimated to be 60 %. E/e'= 10.0   There is mild concentric left ventricular   The right ventricle is mildly enlarged. Normal right ventricular size and function. Mild to moderate tricuspid regurgitation. Estimated pulmonary artery systolic pressure is mildly to moderately   elevated at 35 mmHg assuming a right atrial pressure of 3 mmHg. Moderate pulmonic regurgitation present.    The pulmonic valve is structurally normal.     Cath: 21  Findings:  Artery Findings/Result   LM Normal   LAD 90% prox, 80% mid involving D1 ostial, 99% mid with ANTON 2 flow distal LAD   Cx 90% OM1, OM1 very ectatic   RI N/A   RCA 99% mid   LVEDP 10   LVG 55%, basal inferior hk      Intervention:         None     Post Cath Dx:       CABG referral inpatient    Procedure(s): 21  URGENT CABG CORONARY ARTERY BYPASS GRAFTING X 4, CISNEROS GRAFT TO LAD X 1, SEQUENTIAL VEIN GRAFT TO OM1 AND RPDA, VEIN GRAFT TO DIAGONAL WITH PROXIMAL ANASTIMOSIS END TO SIDE TO VEIN GRAFT TO OM1 AND RPDA, LIGATION SANDRO WITH 50 MM ATRICLIP, EVH LEFT LEG, TOTAL CPB, REJI, DOPPLER BYPASS GRAFT FLOW VERIFICATION, INSERTION OF VENTRICULAR PACING WIRES, BILATERAL INTERCOSTAL NERVE BLOCK WITH EXPAREL AND MARCAINE  CYSTOSCOPY, URETHRAL DILATION, INSERTION OF URETHRAL CATHETER    Problem List:   Patient Active Problem List    Diagnosis Date Noted    Unstable angina ([address] 05/21/2021    Persistent atrial fibrillation ([address] 05/12/2021    Aneurysm of descending thoracic aorta ([address] 03/28/2018    Atherosclerosis of aorta ([facility]) 03/28/2018    Polyneuropathy associated with underlying disease ([address] 10/19/2017    Diverticulosis     Former smoker 07/14/2017    Medical non-compliance 07/14/2017    Type 2 diabetes mellitus with diabetic polyneuropathy, without long-term current use of insulin ([address]     Essential hypertension     Hypercholesteremia     AAA (abdominal aortic [facility]) ([facility] 11/28/2015    Chest pain 10/05/2013        Assessment and Plan:     Coronary artery disease  [facility] 5/21 revealing severe multivessel disease  ~s/p CABG x4, SANDRO ligation 5/25/2021  ~Continue aspirin, lopressor, statin   ~Postop care per CT surgery. Up 15lbs, diuresing with IV lasix. Hypertension  ~Stable    Hyperlipidemia  ~Continue Crestor    Anemia  ~consistent with NOAM   ~Will give Venofer IV x3 doses     AAA  ~CT 4.3 cm in 9/2019  ~CT 4.8 cm 5/24/21  ~f/u with Dr. Ralph Crawford as OP    Multiple medical conditions with risk of decompensation. All pertinent information and plan of care discussed with the physician. All questions and concerns were addressed to the patient. Alternatives to my treatment were discussed. I have discussed the above stated plan with patient and the nurse. The patient verbalized understanding and agreed with the plan. Thank you for allowing to us to participate in the care of Nikai Macedo. Total visit time > 35 minutes; > 50% spend counseling / coordinating care.  I reviewed interval history, physical exam, review of data including labs, imaging, development and implementation of treatment plan and coordination of complex care. Counseled on risk factor modifications. [facility]  [place] [id number]   Office: [phone]    NOTE:  This report was transcribed using voice recognition software. Every effort was made to ensure accuracy; however, inadvertent computerized transcription errors may be present.

## 2021-05-26 NOTE — PROGRESS NOTES
Patient met criteria per open heart protocol to transition to stepdown level of care. Procedures explained to patient. Right IJ  Evern Bees discontinued and obturator inserted. Patient tolerated well and minimal ectopy on monitor. Patient tolerated well.    Pt assisted up to chair with 2 RN assist.

## 2021-05-26 NOTE — CONSULTS
Comprehensive Nutrition Assessment    Type and Reason for Visit:  Initial, Consult    Nutrition Recommendations/Plan:   Diet adv: Cardiac, Carb Control, 2000 mL fluid restriction    Nutrition Assessment:  Consult received s/p cag 5/25. Remains NPO at this time. Recommend to advance diet to cardiac, carb control, 2000mL fluid restriction. Pt was eating well prior to surgery. Will follow for adequate intake and diet education prior to d/c. Malnutrition Assessment:  Malnutrition Status:  No malnutrition        Estimated Daily Nutrient Needs:  Energy (kcal):  2125 - 2550; Weight Used for Energy Requirements:  Admission (85 kg)     Protein (g):  102 - 170; Weight Used for Protein Requirements:  Admission (1.2 - 2.0g/85 kg)        Fluid (ml/day):  2000; Method Used for Fluid Requirements:  Other (Comment)      Nutrition Related Findings:  absent BS, no edema, NS @ 50mL/hr, A1c 6.7      Wounds:  Surgical Incision       Current Nutrition Therapies:    NPO    Anthropometric Measures:  · Height: 5' 9\" (175.3 cm)  · Current Body Weight: 201 lb (91.2 kg)   · Admission Body Weight: 188 lb (85.3 kg)    · Ideal Body Weight: 160 lbs; % Ideal Body Weight 125.6 %   · BMI: 29.7  · BMI Categories: Overweight (BMI 25.0-29. 9)       Nutrition Diagnosis:   · Increased nutrient needs related to increase demand for energy/nutrients as evidenced by wounds      Nutrition Interventions:   Food and/or Nutrient Delivery:  Start Oral Diet  Nutrition Education/Counseling:  Education needed   Coordination of Nutrition Care:  Continue to monitor while inpatient    Goals:  diet adv with PO intake 50% or greate       Nutrition Monitoring and Evaluation:   Behavioral-Environmental Outcomes:  None Identified   Food/Nutrient Intake Outcomes:  Diet Advancement/Tolerance  Physical Signs/Symptoms Outcomes:  Weight     Discharge Planning:     Too soon to determine     Electronically signed by Jolane Meigs, RD, LD on 5/26/21 at 11:08 AM EDT  Contact: [phone]

## 2021-05-26 NOTE — PROGRESS NOTES
Cardiothoracic Surgery Progress Note    CC: s/p CABG X 4, SANDRO  POD# 1    Subjective:  Hemodynamically stable off pressors, 3 L O2 NC. Alert and oriented X 3. Extubated last evening and SG dc'd early am. Patient c/o incisional pain level 6/10. WT:91.3 kg   pre-op 84.7 kg    Vital Signs:   Vitals:    05/26/21 0704   BP: 117/69   Pulse: 95   Resp: 16   Temp: 99.1   SpO2: 95      Gtts: Insulin     Physical Exam:   Cardiac:  NSR  Lungs: clear to auscultation, decreased bases bilaterally  Abdomen:  No BM  Vascular:  pulses all palpable   Extremities: generalized, non-pitting edema 1+  :  /700/260   Lasix 20 mg IV BID  Chest Tube(s) & Incision(s):    Chest Tubes: 57/58/50  No airleak No crepitus -20cm sx  Sternum: stable, edges approximated, no drainage  Chest tube site: C/D/I    Left leg incision:  Ace wrap C/D/I        Labs:   CBC:   Recent Labs     05/25/21  1240 05/26/21  0405   WBC 10.0 10.0   HGB 7.5* 9.2*   HCT 23.9* 28.4*   * 131*     BMP:   Recent Labs     05/25/21  1240 05/26/21  0405   K 4.3 5.0   CREATININE 1.0 1.0   CALCIUM 8.1* 8.8   MG  --  2.10       Assessment/Plan:  As per CC:     Plan:   -Urethral stricture- urology on board, will leave arana 3 days per urology recommendations. Will need follow-up with urology as outpatient  -AAA 4.8 cm per CT (4.3 cm 2019). Will need follow-up with Dr. Tatiana Matute as outpatient   -D/C pacing wires today   -D/C arterial line later today if remains hemodynamically stable   -Follow CVTS CT removal protocol   -Aggressive IS  -Diuresis - strict I/O, fluid restriction   -Wean oxygen  -Ambulate    Disp:  Plan for home with home care       Electronically signed by CHARLOTTE Chambers - CNP on 5/26/2021 at 7:33 AM     Attending Supervising Eladia Funes  The patient met the criteria for indirect supervision. I discussed the findings and plans with the nurse practitioner and agree as documented in her note .     Electronically signed by

## 2021-05-26 NOTE — PROGRESS NOTES
Urology Progress Note  [facility]    Provider: CHARLOTTE Smallwood CNP  Patient ID:  Admission Date: 2021 Name: Syd Valentin  OR Date: 2021 MRN: [id number]   Patient Location: [id number] : 1960  Attending: Socorro Brenner MD Date of Service: 2021  PCP: Ousmane Tejeda MD     Diagnoses:  Urethral stricture     Assessment/Plan:  58 yo M with planned CABG, consulted for difficult arana. Per nursing, history of urethral stricture in the past, voiding spontaneously prior to surgery. See full operative note for details. Now s/p cystoscopy with urethral dilation. Urethral stricture began 5 cm from meatus and I suspect pan urethral given difficulty of dilation and ultimately placement of 16 Fr counciltip arana    Today arana remains in place draining CYU. Hemodynamically stable off pressors.     - cystoscopy with U-Dil in OR for return of clear yellow urine  - keep arana for 3 days    - will need  follow up as stricture high rate of recurrence- requested  - call with questions     The patient had a chance to ask questions which were answered. he understands the above plan. Subjective:   Syd Valentin is a 59 y.o. male. He was seen and examined this morning. Today we discussed follow up in office for more formal cysto udil in future. Discussed keeping arana in for 3 days with VT in hospital.    Objective:   Vitals:  Vitals:    21 0907   BP:    Pulse:    Resp:    Temp:    SpO2: 95%       Intake/Output Summary (Last 24 hours) at 2021 1053  Last data filed at 2021 1000  Gross per 24 hour   Intake 2111.3 ml   Output 2600 ml   Net -488.7 ml     Physical Exam:  Gen: Alert and oriented x3, no acute distress  CV: Regular rate   Resp: unlabored respirations  Abd: Soft, non-distended, non-tender, no masses  Ext: no peripheral edema noted, moves upper and lower extremities spontaneously  Skin: warmand well perfused, no rashes noted on the face, or arms.      Labs:  Lab Results Component Value Date    WBC 10.0 05/26/2021    HGB 9.2 (L) 05/26/2021    HCT 28.4 (L) 05/26/2021    MCV 73.2 (L) 05/26/2021     (L) 05/26/2021     Lab Results   Component Value Date    CREATININE 1.0 05/26/2021    BUN 12 05/26/2021     05/26/2021    K 5.0 05/26/2021     05/26/2021    CO2 22 05/26/2021       Felicia Turner, APRN - CNP   5/26/2021

## 2021-05-27 LAB
GLUCOSE BLD-MCNC: 119 MG/DL (ref 70–99)
GLUCOSE BLD-MCNC: 124 MG/DL (ref 70–99)
GLUCOSE BLD-MCNC: 142 MG/DL (ref 70–99)
GLUCOSE BLD-MCNC: 156 MG/DL (ref 70–99)
GLUCOSE BLD-MCNC: 198 MG/DL (ref 70–99)
GLUCOSE BLD-MCNC: 213 MG/DL (ref 70–99)
GLUCOSE BLD-MCNC: 224 MG/DL (ref 70–99)
GLUCOSE BLD-MCNC: 89 MG/DL (ref 70–99)
GLUCOSE BLD-MCNC: 98 MG/DL (ref 70–99)
MAGNESIUM: 1.8 MG/DL (ref 1.8–2.4)
PERFORMED ON: ABNORMAL
PERFORMED ON: NORMAL
PERFORMED ON: NORMAL
POTASSIUM SERPL-SCNC: 4.8 MMOL/L (ref 3.5–5.1)

## 2021-05-27 PROCEDURE — 97116 GAIT TRAINING THERAPY: CPT

## 2021-05-27 PROCEDURE — APPSS15 APP SPLIT SHARED TIME 0-15 MINUTES: Performed by: NURSE PRACTITIONER

## 2021-05-27 PROCEDURE — 99232 SBSQ HOSP IP/OBS MODERATE 35: CPT | Performed by: NURSE PRACTITIONER

## 2021-05-27 PROCEDURE — 94640 AIRWAY INHALATION TREATMENT: CPT

## 2021-05-27 PROCEDURE — 6370000000 HC RX 637 (ALT 250 FOR IP): Performed by: NURSE PRACTITIONER

## 2021-05-27 PROCEDURE — 97162 PT EVAL MOD COMPLEX 30 MIN: CPT

## 2021-05-27 PROCEDURE — 97166 OT EVAL MOD COMPLEX 45 MIN: CPT

## 2021-05-27 PROCEDURE — 6360000002 HC RX W HCPCS: Performed by: THORACIC SURGERY (CARDIOTHORACIC VASCULAR SURGERY)

## 2021-05-27 PROCEDURE — 2700000000 HC OXYGEN THERAPY PER DAY

## 2021-05-27 PROCEDURE — 6370000000 HC RX 637 (ALT 250 FOR IP): Performed by: INTERNAL MEDICINE

## 2021-05-27 PROCEDURE — 94669 MECHANICAL CHEST WALL OSCILL: CPT

## 2021-05-27 PROCEDURE — 2000000000 HC ICU R&B

## 2021-05-27 PROCEDURE — 97530 THERAPEUTIC ACTIVITIES: CPT

## 2021-05-27 PROCEDURE — 2580000003 HC RX 258: Performed by: THORACIC SURGERY (CARDIOTHORACIC VASCULAR SURGERY)

## 2021-05-27 PROCEDURE — 6360000002 HC RX W HCPCS: Performed by: NURSE PRACTITIONER

## 2021-05-27 PROCEDURE — 6370000000 HC RX 637 (ALT 250 FOR IP): Performed by: THORACIC SURGERY (CARDIOTHORACIC VASCULAR SURGERY)

## 2021-05-27 PROCEDURE — 2580000003 HC RX 258: Performed by: INTERNAL MEDICINE

## 2021-05-27 PROCEDURE — APPNB30 APP NON BILLABLE TIME 0-30 MINS: Performed by: NURSE PRACTITIONER

## 2021-05-27 PROCEDURE — 84132 ASSAY OF SERUM POTASSIUM: CPT

## 2021-05-27 PROCEDURE — 94761 N-INVAS EAR/PLS OXIMETRY MLT: CPT

## 2021-05-27 PROCEDURE — 83735 ASSAY OF MAGNESIUM: CPT

## 2021-05-27 RX ORDER — INSULIN LISPRO 100 [IU]/ML
0-9 INJECTION, SOLUTION INTRAVENOUS; SUBCUTANEOUS NIGHTLY
Status: DISCONTINUED | OUTPATIENT
Start: 2021-05-27 | End: 2021-06-02 | Stop reason: HOSPADM

## 2021-05-27 RX ORDER — INSULIN LISPRO 100 [IU]/ML
0.05 INJECTION, SOLUTION INTRAVENOUS; SUBCUTANEOUS
Status: DISCONTINUED | OUTPATIENT
Start: 2021-05-27 | End: 2021-06-02 | Stop reason: HOSPADM

## 2021-05-27 RX ORDER — INSULIN LISPRO 100 [IU]/ML
0-18 INJECTION, SOLUTION INTRAVENOUS; SUBCUTANEOUS
Status: DISCONTINUED | OUTPATIENT
Start: 2021-05-27 | End: 2021-06-02 | Stop reason: HOSPADM

## 2021-05-27 RX ADMIN — Medication 10 ML: at 08:11

## 2021-05-27 RX ADMIN — INSULIN GLARGINE 13 UNITS: 100 INJECTION, SOLUTION SUBCUTANEOUS at 19:57

## 2021-05-27 RX ADMIN — TRAMADOL HYDROCHLORIDE 50 MG: 50 TABLET, FILM COATED ORAL at 00:19

## 2021-05-27 RX ADMIN — ACETAMINOPHEN 1000 MG: 500 TABLET ORAL at 04:20

## 2021-05-27 RX ADMIN — MUPIROCIN: 20 OINTMENT TOPICAL at 19:53

## 2021-05-27 RX ADMIN — STANDARDIZED SENNA CONCENTRATE AND DOCUSATE SODIUM 1 TABLET: 8.6; 5 TABLET ORAL at 08:07

## 2021-05-27 RX ADMIN — FONDAPARINUX SODIUM 2.5 MG: 2.5 INJECTION SUBCUTANEOUS at 08:07

## 2021-05-27 RX ADMIN — Medication 2 PUFF: at 08:51

## 2021-05-27 RX ADMIN — METOPROLOL TARTRATE 12.5 MG: 25 TABLET, FILM COATED ORAL at 08:08

## 2021-05-27 RX ADMIN — INSULIN LISPRO 5 UNITS: 100 INJECTION, SOLUTION INTRAVENOUS; SUBCUTANEOUS at 13:41

## 2021-05-27 RX ADMIN — FUROSEMIDE 20 MG: 10 INJECTION, SOLUTION INTRAMUSCULAR; INTRAVENOUS at 08:07

## 2021-05-27 RX ADMIN — ASPIRIN 325 MG: 325 TABLET, COATED ORAL at 08:07

## 2021-05-27 RX ADMIN — OXYCODONE 10 MG: 5 TABLET ORAL at 08:49

## 2021-05-27 RX ADMIN — ACETAMINOPHEN 1000 MG: 500 TABLET ORAL at 19:49

## 2021-05-27 RX ADMIN — PANTOPRAZOLE SODIUM 40 MG: 40 TABLET, DELAYED RELEASE ORAL at 08:10

## 2021-05-27 RX ADMIN — INSULIN LISPRO 6 UNITS: 100 INJECTION, SOLUTION INTRAVENOUS; SUBCUTANEOUS at 13:39

## 2021-05-27 RX ADMIN — INSULIN LISPRO 3 UNITS: 100 INJECTION, SOLUTION INTRAVENOUS; SUBCUTANEOUS at 18:22

## 2021-05-27 RX ADMIN — Medication 10 ML: at 19:50

## 2021-05-27 RX ADMIN — ALBUTEROL SULFATE 2.5 MG: 2.5 SOLUTION RESPIRATORY (INHALATION) at 20:37

## 2021-05-27 RX ADMIN — ACETAMINOPHEN 1000 MG: 500 TABLET ORAL at 14:34

## 2021-05-27 RX ADMIN — ALBUTEROL SULFATE 2.5 MG: 2.5 SOLUTION RESPIRATORY (INHALATION) at 16:19

## 2021-05-27 RX ADMIN — INSULIN LISPRO 5 UNITS: 100 INJECTION, SOLUTION INTRAVENOUS; SUBCUTANEOUS at 18:25

## 2021-05-27 RX ADMIN — TRAMADOL HYDROCHLORIDE 50 MG: 50 TABLET, FILM COATED ORAL at 14:32

## 2021-05-27 RX ADMIN — STANDARDIZED SENNA CONCENTRATE AND DOCUSATE SODIUM 1 TABLET: 8.6; 5 TABLET ORAL at 19:49

## 2021-05-27 RX ADMIN — Medication 10 ML: at 08:10

## 2021-05-27 RX ADMIN — METOPROLOL TARTRATE 12.5 MG: 25 TABLET, FILM COATED ORAL at 19:49

## 2021-05-27 RX ADMIN — OXYCODONE 10 MG: 5 TABLET ORAL at 04:20

## 2021-05-27 RX ADMIN — INSULIN LISPRO 3 UNITS: 100 INJECTION, SOLUTION INTRAVENOUS; SUBCUTANEOUS at 19:58

## 2021-05-27 RX ADMIN — TAMSULOSIN HYDROCHLORIDE 0.4 MG: 0.4 CAPSULE ORAL at 19:49

## 2021-05-27 RX ADMIN — LEVOFLOXACIN 750 MG: 5 INJECTION, SOLUTION INTRAVENOUS at 20:05

## 2021-05-27 RX ADMIN — FUROSEMIDE 20 MG: 10 INJECTION, SOLUTION INTRAMUSCULAR; INTRAVENOUS at 18:23

## 2021-05-27 RX ADMIN — ALBUTEROL SULFATE 2.5 MG: 2.5 SOLUTION RESPIRATORY (INHALATION) at 08:51

## 2021-05-27 RX ADMIN — ATORVASTATIN CALCIUM 40 MG: 40 TABLET, FILM COATED ORAL at 19:50

## 2021-05-27 RX ADMIN — Medication 400 MG: at 19:49

## 2021-05-27 RX ADMIN — MUPIROCIN: 20 OINTMENT TOPICAL at 08:11

## 2021-05-27 RX ADMIN — Medication 400 MG: at 08:07

## 2021-05-27 RX ADMIN — OXYCODONE 5 MG: 5 TABLET ORAL at 18:23

## 2021-05-27 RX ADMIN — Medication 2 PUFF: at 20:39

## 2021-05-27 RX ADMIN — ALBUTEROL SULFATE 2.5 MG: 2.5 SOLUTION RESPIRATORY (INHALATION) at 12:15

## 2021-05-27 RX ADMIN — ACETAMINOPHEN 1000 MG: 500 TABLET ORAL at 08:07

## 2021-05-27 ASSESSMENT — PAIN SCALES - WONG BAKER
WONGBAKER_NUMERICALRESPONSE: 8
WONGBAKER_NUMERICALRESPONSE: 8
WONGBAKER_NUMERICALRESPONSE: 0
WONGBAKER_NUMERICALRESPONSE: 0
WONGBAKER_NUMERICALRESPONSE: 8

## 2021-05-27 ASSESSMENT — PAIN SCALES - GENERAL
PAINLEVEL_OUTOF10: 9
PAINLEVEL_OUTOF10: 9
PAINLEVEL_OUTOF10: 7
PAINLEVEL_OUTOF10: 8
PAINLEVEL_OUTOF10: 0
PAINLEVEL_OUTOF10: 0
PAINLEVEL_OUTOF10: 9
PAINLEVEL_OUTOF10: 7
PAINLEVEL_OUTOF10: 6
PAINLEVEL_OUTOF10: 8
PAINLEVEL_OUTOF10: 8
PAINLEVEL_OUTOF10: 3
PAINLEVEL_OUTOF10: 9
PAINLEVEL_OUTOF10: 9

## 2021-05-27 ASSESSMENT — PAIN DESCRIPTION - FREQUENCY
FREQUENCY: INTERMITTENT
FREQUENCY: CONTINUOUS
FREQUENCY: INTERMITTENT

## 2021-05-27 ASSESSMENT — PAIN DESCRIPTION - PAIN TYPE
TYPE: SURGICAL PAIN

## 2021-05-27 ASSESSMENT — PAIN DESCRIPTION - ONSET
ONSET: ON-GOING
ONSET: SUDDEN

## 2021-05-27 ASSESSMENT — PAIN DESCRIPTION - ORIENTATION
ORIENTATION: RIGHT
ORIENTATION: LEFT

## 2021-05-27 ASSESSMENT — PAIN DESCRIPTION - DESCRIPTORS: DESCRIPTORS: ACHING

## 2021-05-27 ASSESSMENT — PAIN DESCRIPTION - LOCATION
LOCATION: BACK
LOCATION: CHEST

## 2021-05-27 ASSESSMENT — PAIN - FUNCTIONAL ASSESSMENT
PAIN_FUNCTIONAL_ASSESSMENT: PREVENTS OR INTERFERES SOME ACTIVE ACTIVITIES AND ADLS
PAIN_FUNCTIONAL_ASSESSMENT: PREVENTS OR INTERFERES SOME ACTIVE ACTIVITIES AND ADLS

## 2021-05-27 NOTE — PROGRESS NOTES
Cardiothoracic Surgery Progress Note    CC: s/p CABG X 4, SANDRO  POD# 2    Subjective:  Hemodynamically stable off pressors, 3 L O2 NC. Alert and oriented X 3. Sitting up in chair without complaints. Weight up slightly from yesterday. Insulin gtt dc'd this am.     WT:91.9/91.3 kg   pre-op 84.7 kg    Vital Signs:   Vitals:    05/26/21 0704   BP: 117/69   Pulse: 95   Resp: 16   Temp: 99.1   SpO2: 95      Physical Exam:   Cardiac:  NSR  Lungs: clear to auscultation, decreased bases bilaterally  Abdomen:  No BM  Vascular:  pulses all palpable   Extremities: generalized, non-pitting edema 1+  :  /1150/275   Lasix 20 mg IV BID  Chest Tube(s) & Incision(s):    Sternum: stable, edges approximated, no drainage  Chest tube site: C/D/I    Left leg incision:  Edges well approximated, no drainage      Labs:   CBC:        05/25/21  1240 05/26/21  0405   WBC 10.0 10.0   HGB 7.5* 9.2*   HCT 23.9* 28.4*   * 131*     BMP:   Recent Labs      05/25/21  1240 05/26/21  0405 5/27/2021   K 4.3 5.0 4.8   CREATININE 1.0 1.0    CALCIUM 8.1* 8.8    MG  --  2.10        Assessment/Plan:  As per CC:     Plan:   -Urethral stricture- urology on board, will leave arana 3 days per urology recommendations. Will need follow-up with urology as outpatient  -AAA 4.8 cm per CT (4.3 cm 2019). Will need follow-up with Dr. Iram Le as outpatient. DM2- SSI, prandial, and lantus insulin ordered. Will restart home glucophage 1000 mg BID tomorrow if Cr ok. -Aggressive IS  -Diuresis - strict I/O, fluid restriction. Will leave K on hold today.   -Wean oxygen  -Ambulate  -Laxative of choice     Disp:  Plan for home with home care       Electronically signed by CHARLOTTE Dallas CNP on 5/27/2021 at 11:18 AM     Attending Supervising Eladia Funes  The patient met the criteria for indirect supervision. I discussed the findings and plans with the nurse practitioner and agree as documented in her note .     Electronically signed by Jl Davis MD on 5/27/21 at 12:59 PM EDT

## 2021-05-27 NOTE — PROGRESS NOTES
Occupational Therapy   Occupational Therapy Initial Assessment/Treatment  Date: 2021   Patient Name: Lan Rodriguez  MRN: [id number]     : 1960    Date of Service: 2021    Discharge Recommendations:  Home with Home health OT, 24 hour supervision or assist, S Level 1  OT Equipment Recommendations  Other: Pt would benefit from a shower chair in order to increase activity tolerance and decrease risk of falls for d/c home. Lan Rodriguez scored a 18/24 on the AM-PAC ADL Inpatient form. Current research shows that an AM-PAC score of 18 or greater is typically associated with a discharge to the patient's home setting. Based on the patient's AM-PAC score, and their current ADL deficits, it is recommended that the patient have 2-3 sessions per week of Occupational Therapy at d/c to increase the patient's independence. At this time, this patient demonstrates the endurance and safety to discharge home with home  services and a follow up treatment frequency of 2-3x/wk. Please see assessment section for further patient specific details. If patient discharges prior to next session this note will serve as a discharge summary. Please see below for the latest assessment towards goals. Assessment   Performance deficits / Impairments: Decreased functional mobility ; Decreased endurance;Decreased high-level IADLs;Decreased ADL status; Decreased balance  Assessment: Pt functioning slighty below baseline due to the above listed impairments. Anticipate good progress with POC and safe d/c home with 24hr supervision. Pt would benefit from ongoing OT in order to increase functional status. Prognosis: Good  Decision Making: Medium Complexity  OT Education: OT Role;Transfer Training;Plan of Care;Energy Conservation;Precautions; ADL Adaptive Strategies  Patient Education: sternal precautions with ADLs  REQUIRES OT FOLLOW UP: Yes  Activity Tolerance  Activity Tolerance: Patient limited by pain; Patient limited by fatigue;Patient Tolerated treatment well  Activity Tolerance: Pt reporting increase back pain during mobility in hallway; SpO2 WFLs throughout session  Safety Devices  Safety Devices in place: Yes  Type of devices: All fall risk precautions in place;Nurse notified; Left in chair;Call light within reach; Patient at risk for falls           Patient Diagnosis(es): There were no encounter diagnoses. has a past medical history of AAA (abdominal aortic aneurysm) ([facility] [address] Aneurysm of descending thoracic aorta ([facility] [address] Arthritis, Atherosclerosis of aorta ([facility] [address] Back pain, Diabetes mellitus ([facility] [address] Diverticulosis, Elevated PSA, Hyperlipidemia, Hypertension, and Polyneuropathy associated with underlying disease ([facility] [address] has a past surgical history that includes Upper gastrointestinal endoscopy (11/30/15); Urethra dilation (5/2017, 7/2017); Cardiac catheterization; Upper gastrointestinal endoscopy (N/A, 10/12/2018); Coronary artery bypass graft (N/A, 5/25/2021); and Cystoscopy (5/25/2021). Restrictions  Restrictions/Precautions  Restrictions/Precautions: Fall Risk, Cardiac (medium fall risk  Simultaneous filing. User may not have seen previous data.)  Position Activity Restriction  Sternal Precautions: No Pushing, No Pulling, 5# Lifting Restrictions  Other position/activity restrictions: CABG x4, SANDRO ligation 5/25/2021 (Simultaneous filing.  User may not have seen previous data.)    Subjective   General  Chart Reviewed: Yes, Orders, History and Physical, Imaging, Labs  Patient assessed for rehabilitation services?: Yes  Additional Pertinent Hx: CAD, HTN  Family / Caregiver Present: No  Diagnosis: CABG x4, SANDRO ligation 5/25/2021  Subjective  Subjective: Pt up in chair and agreeable to OT  Patient Currently in Pain: Yes  Pain Assessment  Pain Assessment: 0-10  Pain Level: 9  Diamond-Baker Pain Rating: Hurts whole lot  Patient's Stated Pain Goal: No pain  Pain Type: Surgical pain  Pain Location: Back  Pain Orientation: unit ~270ft)  Assist Level: Contact guard assistance  Functional Mobility Comments: X1 seated rest break due to back pain; 3L of O2  Toilet Transfers  Toilet Transfers Comments: declined the need  ADL  UE Dressing: Other (Comment) (assisted with [facility] gown)  Toileting: Dependent/Total (arana)  Additional Comments: Pt declined all additional ADLs;  Anticipate the need for mod A for LB ADLs due to pain, deficits in strength, ROM, balance, and mobility  Tone RUE  RUE Tone: Normotonic  Tone LUE  LUE Tone: Normotonic     Bed mobility  Comment: NT; pt up in chair at start and end of session  Transfers  Sit to stand: Contact guard assistance  Stand to sit: Contact guard assistance  Transfer Comments: verbal cues for hand placement and technique     Cognition  Overall Cognitive Status: WFL        Sensation  Overall Sensation Status: WFL        LUE AROM (degrees)  LUE General AROM: WFL through surgical protocol ~90 degrees  RUE AROM (degrees)  RUE General AROM: WFL through surgical protocol ~90 degrees                      Plan   Plan  Times per week: 3-5  Current Treatment Recommendations: Functional Mobility Training, Patient/Caregiver Education & Training, Equipment Evaluation, Education, & procurement, Endurance Training, ROM, Balance Training, Safety Education & Training, Self-Care / ADL                   AM-PAC Score        AM-[facility] Inpatient Daily Activity Raw Score: 18 (05/27/21 1101)  AM-PAC Inpatient ADL T-Scale Score : 38.66 (05/27/21 1101)  ADL Inpatient CMS 0-100% Score: 46.65 (05/27/21 1101)  ADL Inpatient CMS G-Code Modifier : CK (05/27/21 1101)    Goals  Short term goals  Time Frame for Short term goals: prior to d/c  Short term goal 1: Pt will complete Lb dressing with min A  Short term goal 2: Pt will complete toileting with supervision  Short term goal 3: Pt will complete functional transfers with supevision  Short term goal 4: Pt will demonstrate understanding of 3/3 sternal precautions due ADLs and transfers in preparation for safe d/c home       Therapy Time   Individual Concurrent Group Co-treatment   Time In 0954         Time Out 1020         Minutes 26         Timed Code Treatment Minutes: [address], OTR/L

## 2021-05-27 NOTE — PROGRESS NOTES
[facility]   Cardiology Progress Note     Date: 5/27/2021  Admit Date: 5/21/2021     Chief Complaint: No chief complaint on file. History of Present Illness: History obtained from patient and medical record. Faiza Mcbride is a 59 y.o. male with a past medical history of AAA, hypertension, hyperlipidemia. Presented for outpatient left heart cath due to complaints of typical chest pain. LHC revealed multivessel disease. Underwent CABG x4, SANDRO ligation 5/25/2021    Interval Hx: Underwent CABG 5/25. Today is POD #2. gtts off and CT out yesterday. Walking in the hallway. Wt up 16lbs from preop. On IV lasix with good urine output. Pain controlled. On 3L of oxygen. Patient seen and examined. Clinical notes reviewed. Telemetry reviewed. No new complaints today. No major events overnight. Denies having chest pain, palpitations, shortness of breath, orthopnea/PND, cough, or dizziness at the time of this visit. Past Medical History:  Past Medical History:   Diagnosis Date    AAA (abdominal aortic aneurysm) ([address] 11/28/2015    4.3 cm 9/2019. yearly CT    Aneurysm of descending thoracic aorta ([address] 03/28/2018    2018: 3 cm    Arthritis     Atherosclerosis of aorta ([address] 03/28/2018    extensive per CT 2018    Back pain     Diabetes mellitus ([address]     Diverticulosis     Elevated PSA 10/19/2017    Hyperlipidemia     Hypertension     Polyneuropathy associated with underlying disease ([address] 10/19/2017        Past Surgical History:    has a past surgical history that includes Upper gastrointestinal endoscopy (11/30/15); Urethra dilation (5/2017, 7/2017); Cardiac catheterization; Upper gastrointestinal endoscopy (N/A, 10/12/2018); Coronary artery bypass graft (N/A, 5/25/2021); and Cystoscopy (5/25/2021). Social History:  Reviewed. reports that he quit smoking about 11 years ago. His smoking use included cigarettes. He has a 20.00 pack-year smoking history.  He has never used smokeless tobacco. He reports that he does not drink alcohol and does not use drugs. Allergies:  No Known Allergies    Family History:  Reviewed. family history is not on file. Denies family history of sudden cardiac death, arrhythmia, premature CAD    Home Meds:  Prior to Visit Medications    Medication Sig Taking? Authorizing Provider   ferrous sulfate (IRON 325) 325 (65 Fe) MG tablet Take 1 tablet by mouth daily (with breakfast) Yes Riccardo Riley MD   metoprolol succinate (TOPROL XL) 25 MG extended release tablet Take 1 tablet by mouth daily Yes Riccardo Riley MD   amLODIPine (NORVASC) 5 MG tablet TAKE ONE TABLET BY MOUTH DAILY Yes Riccardo Riley MD   tamsulosin (FLOMAX) 0.4 MG capsule TAKE TWO CAPSULES BY MOUTH DAILY Yes Johnathan Flores MD   lisinopril (PRINIVIL;ZESTRIL) 40 MG tablet Take 1 tablet by mouth daily Yes Riccardo Riley MD   metFORMIN (GLUCOPHAGE) 1000 MG tablet Take 1 tablet by mouth 2 times daily (with meals) Yes Riccardo Riley MD   aspirin 81 MG tablet Take 81 mg by mouth daily Yes Historical Provider, MD   rosuvastatin (CRESTOR) 20 MG tablet Take 1 tablet by mouth nightly  Riccardo Riley MD   ibuprofen (ADVIL;MOTRIN) 400 MG tablet Take 400 mg by mouth every 6 hours as needed for Pain  Historical Provider, MD   Blood Pressure Monitoring QUARTERMAIN) MISC Use as directed  Patient not taking: Reported on 5/13/2021  Riccardo Riley MD   glucose monitoring kit (FREESTYLE) monitoring kit 1 each by Does not apply route once for 1 dose  Riccardo Riley MD   Blood Pressure Monitoring QUARTERMAIN) MISC Use as directed  Patient not taking: Reported on 5/13/2021  Riccardo Riley MD   traMADol (ULTRAM) 50 MG tablet Take 50 mg by mouth every 6 hours as needed.   Historical Provider, MD        Scheduled Meds:   desmopressin (DDAVP) IVPB  16 mcg Intravenous Once    sodium chloride flush  10 mL Intravenous 2 times per day    fondaparinux  2.5 mg Subcutaneous Daily    acetaminophen  1,000 mg Oral Q6H    furosemide  20 mg Intravenous BID    magnesium oxide  400 mg Oral BID    mupirocin   Nasal BID    [Held by provider] potassium chloride  10 mEq Oral TID     sennosides-docusate sodium  1 tablet Oral BID    metoprolol tartrate  12.5 mg Oral BID    [START ON 5/29/2021] lisinopril  2.5 mg Oral Lunch    atorvastatin  40 mg Oral Nightly    pantoprazole  40 mg Oral Daily    albuterol  2.5 mg Nebulization Q4H WA    insulin glargine  0.15 Units/kg Subcutaneous Nightly    aspirin  325 mg Oral Daily    levofloxacin  750 mg Intravenous Q24H    budesonide-formoterol  2 puff Inhalation BID    tamsulosin  0.4 mg Oral Daily    sodium chloride flush  5-40 mL Intravenous 2 times per day     Continuous Infusions:   insulin (HUMAN R) weight based infusion      sodium chloride      insulin Stopped (05/27/21 0420)    dextrose       PRN Meds:insulin (HUMAN R) weight based infusion, sodium chloride flush, ondansetron, oxyCODONE **OR** oxyCODONE, fentanNYL, hydrALAZINE, metoprolol, diphenhydrAMINE, polyethylene glycol, magnesium hydroxide, potassium chloride, magnesium sulfate, calcium chloride IVPB, calcium chloride IVPB, albuterol sulfate HFA, sodium chloride, glucose, dextrose, glucagon (rDNA), dextrose, traMADol     Review of Systems:  · Constitutional: Negative for fever, night sweats, chills,  · Skin: Negative for rash, pruritus, bleeding, or bruising    · HEENT: Negative for vision changes, ringing in the ears, dysphagia  · Respiratory: Reviewed in HPI  · Cardiovascular: Reviewed in HPI  · Gastrointestinal: Negative for abdominal pain, N/V/D, constipation, or black/tarry stools  · Genito-Urinary: Negative for dysuria, incontinence, or hematuria  · Musculoskeletal: Negative for joint swelling, muscle pain, or injuries  · Neurological/Psych: Negative for confusion, seizures, headaches, or TIA-like symptoms. No anxiety or depression.      Physical Examination:  Vitals: 05/27/21 0925   BP:    Pulse: 101   Resp:    Temp:    SpO2:       In: 655.1 [P.O.:360; I.V.:34.8]  Out: 1425    Wt Readings from Last 3 Encounters:   05/27/21 202 lb 9.6 oz (91.9 kg)   05/13/21 192 lb 1.6 oz (87.1 kg)   05/10/21 194 lb (88 kg)       Intake/Output Summary (Last 24 hours) at 5/27/2021 1009  Last data filed at 5/27/2021 1128  Gross per 24 hour   Intake 655.13 ml   Output 1670 ml   Net -1014.87 ml       Telemetry: Personally Reviewed  - Sinus rhythm   · Constitutional: Cooperative and in no apparent distress, and appears well nourished  · Skin: Warm and pink; no pallor, cyanosis, clubbing, or bruising   · HEENT: Symmetric and normocephalic. PERRL. Conjunctiva pink with clear sclera. Mucus membranes moist.   · Cardiovascular: Regular rate and rhythm. S1/S2 present without murmurs, no rubs or gallops. Peripheral pulses 2+, capillary refill < 3 seconds. No elevation of JVP. No peripheral edema  · Respiratory: Respirations symmetric and unlabored. Lungs clear to auscultation bilaterally, no wheezing, crackles, or rhonchi  · Gastrointestinal: Abdomen soft and round. Bowel sounds active without tenderness. +arana  · Musculoskeletal: Bilateral upper and lower extremity strength with generalized weakness   · Neurologic/Psych: Awake and orientated to person, place and time. Calm affect, appropriate mood    Pertinent labs, diagnostic, device, and imaging results reviewed as a part of this visit    Labs:    BMP:   Recent Labs     05/25/21  1240 05/25/21  1246 05/26/21  0405 05/27/21  0431     --  137  --    K 4.3  --  5.0 4.8   *  --  109  --    CO2 22  --  22  --    BUN 14  --  12  --    CREATININE 1.0  --  1.0  --    MG  --  2.80* 2.10 1.80     Estimated Creatinine Clearance: 85 mL/min (based on SCr of 1 mg/dL).    CBC:   Recent Labs     05/25/21  1240 05/25/21  1854 05/26/21  0405   WBC 10.0  --  10.0   HGB 7.5* 9.6* 9.2*   HCT 23.9*  --  28.4*   MCV 72.3*  --  73.2*   *  --  131* Thyroid:   Lab Results   Component Value Date    TSH 1.360 2019     Lipids:   Lab Results   Component Value Date    CHOL 177 2021    HDL 42 2021    TRIG 109 2021     LFTS:   Lab Results   Component Value Date    ALT 12 2021    AST 14 2021    ALKPHOS 61 2021    PROT 6.2 2021    AGRATIO 1.7 2021    BILITOT <0.2 2021     Cardiac Enzymes:   Lab Results   Component Value Date    TROPONINI <0.01 2015    TROPONINI <0.01 2015    TROPONINI <0.01 10/06/2013    TROPONINI <0.01 10/05/2013    TROPONINI <0.01 10/05/2013    TROPONINI 0.01 10/05/2013    TROPONINI 0.01 10/05/2013     Coags:   Lab Results   Component Value Date    PROTIME 15.2 2021    INR 1.31 2021     EC21  Normal sinus rhythm  Minimal voltage criteria for LVH, may be normal variant  Nonspecific ST abnormality  Abnormal ECG    ECHO: 21   Summary   Overall left ventricular function is normal.   Ejection fraction is visually estimated to be 60 %. E/e'= 10.0   There is mild concentric left ventricular   The right ventricle is mildly enlarged. Normal right ventricular size and function. Mild to moderate tricuspid regurgitation. Estimated pulmonary artery systolic pressure is mildly to moderately   elevated at 35 mmHg assuming a right atrial pressure of 3 mmHg. Moderate pulmonic regurgitation present.    The pulmonic valve is structurally normal.     Cath: 21  Findings:  Artery Findings/Result   LM Normal   LAD 90% prox, 80% mid involving D1 ostial, 99% mid with ANTON 2 flow distal LAD   Cx 90% OM1, OM1 very ectatic   RI N/A   RCA 99% mid   LVEDP 10   LVG 55%, basal inferior hk      Intervention:         None     Post Cath Dx:       CABG referral inpatient    Procedure(s): 21  URGENT CABG CORONARY ARTERY BYPASS GRAFTING X 4, CISNEROS GRAFT TO LAD X 1, SEQUENTIAL VEIN GRAFT TO OM1 AND RPDA, VEIN GRAFT TO DIAGONAL WITH PROXIMAL ANASTIMOSIS END TO SIDE TO VEIN GRAFT TO OM1 AND RPDA, LIGATION SANDRO WITH 50 MM ATRICLIP, EVH LEFT LEG, TOTAL CPB, REJI, DOPPLER BYPASS GRAFT FLOW VERIFICATION, INSERTION OF VENTRICULAR PACING WIRES, BILATERAL INTERCOSTAL NERVE BLOCK WITH EXPAREL AND MARCAINE  CYSTOSCOPY, URETHRAL DILATION, INSERTION OF URETHRAL CATHETER    Problem List:   Patient Active Problem List    Diagnosis Date Noted    Unstable angina ([address] 05/21/2021    Persistent atrial fibrillation ([address] 05/12/2021    Aneurysm of descending thoracic aorta ([address] 03/28/2018    Atherosclerosis of aorta ([facility]) 03/28/2018    Polyneuropathy associated with underlying disease ([address] 10/19/2017    Diverticulosis     Former smoker 07/14/2017    Medical non-compliance 07/14/2017    Type 2 diabetes mellitus with diabetic polyneuropathy, without long-term current use of insulin ([address]     Essential hypertension     Hypercholesteremia     AAA (abdominal aortic aneurysm) ([facility] [address] 11/28/2015    Chest pain 10/05/2013        Assessment and Plan:     Coronary artery disease  [facility] 5/21 revealing severe multivessel disease  ~s/p CABG x4, SANDRO ligation 5/25/2021, POD #2  ~Continue aspirin, lopressor, statin, lisinopril   ~Postop care per CT surgery. Up 16lbs, diuresing with IV lasix. Hypertension  ~Stable    Hyperlipidemia  ~Continue Crestor    Anemia  ~consistent with NOAM   ~received Venofer IV x3 doses     AAA  ~CT 4.3 cm in 9/2019  ~CT 4.8 cm 5/24/21  ~f/u with Dr. Jovani Harrington as OP    Multiple medical conditions with risk of decompensation. All pertinent information and plan of care discussed with the physician. All questions and concerns were addressed to the patient. Alternatives to my treatment were discussed. I have discussed the above stated plan with patient and the nurse. The patient verbalized understanding and agreed with the plan. Thank you for allowing to us to participate in the care of Thomes Lawn. Total visit time > 35 minutes; > 50% spend counseling / coordinating care.  I reviewed interval history, physical exam, review of data including labs, imaging, development and implementation of treatment plan and coordination of complex care. Counseled on risk factor modifications. [facility] [place]  [place]    Office: [phone]    NOTE:  This report was transcribed using voice recognition software. Every effort was made to ensure accuracy; however, inadvertent computerized transcription errors may be present.

## 2021-05-27 NOTE — CARE COORDINATION
CM contacted [address] [place] [address] liaison of jeffry fitzgerald.   Mickey Charles, BSN, CCM, RN  [facility]  [phone]

## 2021-05-27 NOTE — PROGRESS NOTES
Education: PT Role;Plan of Care;Transfer Training;General Safety;Gait Training;Precautions  Patient Education: pt verbalized understanding  Barriers to Learning: none  REQUIRES PT FOLLOW UP: Yes  Activity Tolerance  Activity Tolerance: Patient limited by pain; Patient Tolerated treatment well;Patient limited by fatigue  Activity Tolerance: O2 sat was 93-96% before and after ambulation on 3L/min of O2       Patient Diagnosis(es): There were no encounter diagnoses. has a past medical history of AAA (abdominal aortic aneurysm) ([address] Aneurysm of descending thoracic aorta ([address] Arthritis, Atherosclerosis of aorta ([address] Back pain, Diabetes mellitus ([address] Diverticulosis, Elevated PSA, Hyperlipidemia, Hypertension, and Polyneuropathy associated with underlying disease ([address] has a past surgical history that includes Upper gastrointestinal endoscopy (11/30/15); Urethra dilation (5/2017, 7/2017); Cardiac catheterization; Upper gastrointestinal endoscopy (N/A, 10/12/2018); Coronary artery bypass graft (N/A, 5/25/2021); and Cystoscopy (5/25/2021). Restrictions  Restrictions/Precautions  Restrictions/Precautions: Fall Risk, Cardiac (medium fall risk  Simultaneous filing. User may not have seen previous data.)  Position Activity Restriction  Sternal Precautions: No Pushing, No Pulling, 5# Lifting Restrictions  Other position/activity restrictions: CABG x4, SANDRO ligation 5/25/2021 (Simultaneous filing.  User may not have seen previous data.)  Vision/Hearing  Vision: Within Functional Limits  Hearing: Within functional limits     Subjective  General  Chart Reviewed: Yes  Patient assessed for rehabilitation services?: Yes  Family / Caregiver Present: No  Diagnosis: chest pain, s/p CABG  Follows Commands: Within Functional Limits  General Comment  Comments: pt was seated in chair upon PT arrival, agreeable to PT  Subjective  Subjective: pt indicated pain in his L low back into his flank with gait at 9/10, RN notified, pain improved with rest  Pain Screening  Patient Currently in Pain: Yes          Orientation  Orientation  Overall Orientation Status: Within Functional Limits  Social/Functional History  Social/Functional History  Lives With: Spouse  Type of Home: House  Home Layout: One level  Home Access: Stairs to enter without rails  Entrance Stairs - Number of Steps: 1 DENNISE  Bathroom Shower/Tub: Tub/Shower unit  Bathroom Toilet: Standard  Bathroom Accessibility: Walker accessible  ADL Assistance: Independent  Homemaking Assistance: Independent  Homemaking Responsibilities: No (Wife assists with all cooking, cleaning , and laundry)  Ambulation Assistance: Independent  Transfer Assistance: Independent  Active : Yes  Occupation: Full time employment    Objective    AROM RLE (degrees)  RLE AROM: WFL  AROM LLE (degrees)  LLE AROM : WFL  Strength RLE  Strength RLE: WFL  Strength LLE  Strength LLE: WFL  Tone RLE  RLE Tone: Normotonic  Tone LLE  LLE Tone: Normotonic  Motor Control  Gross Motor?: WFL  Sensation  Overall Sensation Status: WFL     Transfers  Sit to Stand: Contact guard assistance  Stand to sit: Contact guard assistance  Ambulation  Ambulation?: Yes  Ambulation 1  Surface: level tile  Device: Rollator  Other Apparatus: O2 (3 L/min of O2)  Assistance: Contact guard assistance  Quality of Gait: varying reji as pt attempts to walk with a fast reji (VC to remain in control of reji)  Distance: 200', 100'  Comments: pt stopped and sat during ambulation due to back/flank pain, pt was diaphoretic before and after ambulation  Stairs/Curb  Stairs?: No     Balance  Posture: Good  Sitting - Static: Good  Sitting - Dynamic: Good  Standing - Static: Good;-  Standing - Dynamic: Fair;+ (CGA with 4WW, decreased by fatigue)        Plan   Plan  Times per week: 3-5  Current Treatment Recommendations: Strengthening, Balance Training, Functional Mobility Training, Transfer Training, Endurance Training, Stair training, Gait Training, Neuromuscular Re-education, Safety Education & Training  Safety Devices  Type of devices: All fall risk precautions in place, Call light within reach, Chair alarm in place, Nurse notified      AM-PAC Score  AM-PAC Inpatient Mobility Raw Score : 16 (05/27/21 1049)  AM-PAC Inpatient T-Scale Score : 40.78 (05/27/21 1049)  Mobility Inpatient CMS 0-100% Score: 54.16 (05/27/21 1049)  Mobility Inpatient CMS G-Code Modifier : CK (05/27/21 1049)          Goals  Short term goals  Time Frame for Short term goals:  To be met prior to Dc  Short term goal 1: Pt will perform bed mobility with min A  Short term goal 2: Pt will perform sit to/from stand with supervision  Short term goal 3: Pt will ambulate 300' with 4WW and supervision  Short term goal 4: Pt will ascend/descend 3 stairs with AAD and CGA  Patient Goals   Patient goals : return home with wife, pt plans to retire       Therapy Time   Individual Concurrent Group Co-treatment   Time In 0954         Time Out 1018         Minutes 24         Timed Code Treatment Minutes: [address], [address] [facility] DPT [id number]

## 2021-05-27 NOTE — PROGRESS NOTES
Urology Progress Note  [facility]    Provider: CHARLOTTE Crespo CNP  Patient ID:  Admission Date: 2021 Name: Richard Barron  OR Date: 2021 MRN: [id number]   Patient Location: [facility]8942/[id number] : 1960  Attending: Guerita Bosch MD Date of Service: 2021  PCP: Hanane Carter MD     Diagnoses:  Urethral stricture     Assessment/Plan:  60 yo M with planned CABG, consulted for difficult arana. Per nursing, history of urethral stricture in the past, voiding spontaneously prior to surgery. See full operative note for details. Now s/p cystoscopy with urethral dilation. Urethral stricture began 5 cm from meatus and I suspect pan urethral given difficulty of dilation and ultimately placement of 16 Fr counciltip arana    Today arana remains in place draining CYU. Hemodynamically stable off pressors.     - cystoscopy with U-Dil in OR for return of clear yellow urine  - keep arnaa for 3 days    - will need  follow up as stricture high rate of recurrence- requested  - call with questions     The patient had a chance to ask questions which were answered. he understands the above plan. Subjective:   Richard Barron is a 59 y.o. male. He was seen and examined this morning. Today we discussed follow up in office for more formal cysto udil in future.  Discussed keeping arana in for 3 days with VT in hospital.    Objective:   Vitals:  Vitals:    21 0925   BP:    Pulse: 101   Resp:    Temp:    SpO2:        Intake/Output Summary (Last 24 hours) at 2021 1027  Last data filed at 2021 0603  Gross per 24 hour   Intake 655.13 ml   Output 1670 ml   Net -1014.87 ml       Labs:  Lab Results   Component Value Date    WBC 10.0 2021    HGB 9.2 (L) 2021    HCT 28.4 (L) 2021    MCV 73.2 (L) 2021     (L) 2021     Lab Results   Component Value Date    CREATININE 1.0 2021    BUN 12 2021     2021    K 4.8 2021     2021    CO2 22 05/26/2021       Dane Raymond APRN - CNP   5/27/2021

## 2021-05-28 ENCOUNTER — APPOINTMENT (OUTPATIENT)
Dept: GENERAL RADIOLOGY | Age: 61
DRG: 234 | End: 2021-05-28
Attending: INTERNAL MEDICINE
Payer: COMMERCIAL

## 2021-05-28 LAB
ANION GAP SERPL CALCULATED.3IONS-SCNC: 7 MMOL/L (ref 3–16)
BLOOD BANK DISPENSE STATUS: NORMAL
BLOOD BANK DISPENSE STATUS: NORMAL
BLOOD BANK PRODUCT CODE: NORMAL
BLOOD BANK PRODUCT CODE: NORMAL
BPU ID: NORMAL
BPU ID: NORMAL
BUN BLDV-MCNC: 19 MG/DL (ref 7–20)
CALCIUM SERPL-MCNC: 8.7 MG/DL (ref 8.3–10.6)
CHLORIDE BLD-SCNC: 99 MMOL/L (ref 99–110)
CO2: 26 MMOL/L (ref 21–32)
CREAT SERPL-MCNC: 1.2 MG/DL (ref 0.8–1.3)
DESCRIPTION BLOOD BANK: NORMAL
DESCRIPTION BLOOD BANK: NORMAL
GFR AFRICAN AMERICAN: >60
GFR NON-AFRICAN AMERICAN: >60
GLUCOSE BLD-MCNC: 129 MG/DL (ref 70–99)
GLUCOSE BLD-MCNC: 133 MG/DL (ref 70–99)
GLUCOSE BLD-MCNC: 174 MG/DL (ref 70–99)
GLUCOSE BLD-MCNC: 197 MG/DL (ref 70–99)
GLUCOSE BLD-MCNC: 214 MG/DL (ref 70–99)
HCT VFR BLD CALC: 27 % (ref 40.5–52.5)
HEMOGLOBIN: 8.8 G/DL (ref 13.5–17.5)
MAGNESIUM: 2 MG/DL (ref 1.8–2.4)
MCH RBC QN AUTO: 23.8 PG (ref 26–34)
MCHC RBC AUTO-ENTMCNC: 32.6 G/DL (ref 31–36)
MCV RBC AUTO: 73 FL (ref 80–100)
PDW BLD-RTO: 19.2 % (ref 12.4–15.4)
PERFORMED ON: ABNORMAL
PLATELET # BLD: 131 K/UL (ref 135–450)
PMV BLD AUTO: 10.1 FL (ref 5–10.5)
POTASSIUM SERPL-SCNC: 4.4 MMOL/L (ref 3.5–5.1)
RBC # BLD: 3.69 M/UL (ref 4.2–5.9)
SODIUM BLD-SCNC: 132 MMOL/L (ref 136–145)
WBC # BLD: 10 K/UL (ref 4–11)

## 2021-05-28 PROCEDURE — 83735 ASSAY OF MAGNESIUM: CPT

## 2021-05-28 PROCEDURE — 94640 AIRWAY INHALATION TREATMENT: CPT

## 2021-05-28 PROCEDURE — 71045 X-RAY EXAM CHEST 1 VIEW: CPT

## 2021-05-28 PROCEDURE — 94669 MECHANICAL CHEST WALL OSCILL: CPT

## 2021-05-28 PROCEDURE — 6370000000 HC RX 637 (ALT 250 FOR IP): Performed by: THORACIC SURGERY (CARDIOTHORACIC VASCULAR SURGERY)

## 2021-05-28 PROCEDURE — 94760 N-INVAS EAR/PLS OXIMETRY 1: CPT

## 2021-05-28 PROCEDURE — 6360000002 HC RX W HCPCS: Performed by: THORACIC SURGERY (CARDIOTHORACIC VASCULAR SURGERY)

## 2021-05-28 PROCEDURE — 85027 COMPLETE CBC AUTOMATED: CPT

## 2021-05-28 PROCEDURE — APPSS15 APP SPLIT SHARED TIME 0-15 MINUTES: Performed by: NURSE PRACTITIONER

## 2021-05-28 PROCEDURE — 80048 BASIC METABOLIC PNL TOTAL CA: CPT

## 2021-05-28 PROCEDURE — 97116 GAIT TRAINING THERAPY: CPT

## 2021-05-28 PROCEDURE — 2580000003 HC RX 258: Performed by: NURSE PRACTITIONER

## 2021-05-28 PROCEDURE — 2580000003 HC RX 258: Performed by: INTERNAL MEDICINE

## 2021-05-28 PROCEDURE — 2580000003 HC RX 258: Performed by: THORACIC SURGERY (CARDIOTHORACIC VASCULAR SURGERY)

## 2021-05-28 PROCEDURE — 2700000000 HC OXYGEN THERAPY PER DAY

## 2021-05-28 PROCEDURE — 97530 THERAPEUTIC ACTIVITIES: CPT

## 2021-05-28 PROCEDURE — 6360000002 HC RX W HCPCS: Performed by: NURSE PRACTITIONER

## 2021-05-28 PROCEDURE — 2000000000 HC ICU R&B

## 2021-05-28 PROCEDURE — 6370000000 HC RX 637 (ALT 250 FOR IP): Performed by: INTERNAL MEDICINE

## 2021-05-28 PROCEDURE — APPNB30 APP NON BILLABLE TIME 0-30 MINS: Performed by: NURSE PRACTITIONER

## 2021-05-28 PROCEDURE — 6370000000 HC RX 637 (ALT 250 FOR IP): Performed by: NURSE PRACTITIONER

## 2021-05-28 RX ORDER — AMIODARONE HYDROCHLORIDE 200 MG/1
200 TABLET ORAL DAILY
Status: DISCONTINUED | OUTPATIENT
Start: 2021-06-02 | End: 2021-06-02 | Stop reason: HOSPADM

## 2021-05-28 RX ORDER — AMIODARONE HYDROCHLORIDE 200 MG/1
400 TABLET ORAL 2 TIMES DAILY
Status: COMPLETED | OUTPATIENT
Start: 2021-05-28 | End: 2021-06-01

## 2021-05-28 RX ORDER — LIDOCAINE 4 G/G
1 PATCH TOPICAL DAILY
Status: DISCONTINUED | OUTPATIENT
Start: 2021-05-28 | End: 2021-06-02 | Stop reason: HOSPADM

## 2021-05-28 RX ADMIN — MAGNESIUM HYDROXIDE 30 ML: 400 SUSPENSION ORAL at 18:27

## 2021-05-28 RX ADMIN — OXYCODONE 10 MG: 5 TABLET ORAL at 18:27

## 2021-05-28 RX ADMIN — INSULIN LISPRO 5 UNITS: 100 INJECTION, SOLUTION INTRAVENOUS; SUBCUTANEOUS at 12:49

## 2021-05-28 RX ADMIN — POLYETHYLENE GLYCOL 3350 17 G: 17 POWDER, FOR SOLUTION ORAL at 10:04

## 2021-05-28 RX ADMIN — ACETAMINOPHEN 1000 MG: 500 TABLET ORAL at 08:53

## 2021-05-28 RX ADMIN — ALBUTEROL SULFATE 2.5 MG: 2.5 SOLUTION RESPIRATORY (INHALATION) at 12:35

## 2021-05-28 RX ADMIN — AMIODARONE HYDROCHLORIDE 400 MG: 200 TABLET ORAL at 10:02

## 2021-05-28 RX ADMIN — ACETAMINOPHEN 1000 MG: 500 TABLET ORAL at 14:21

## 2021-05-28 RX ADMIN — FONDAPARINUX SODIUM 2.5 MG: 2.5 INJECTION SUBCUTANEOUS at 09:02

## 2021-05-28 RX ADMIN — INSULIN GLARGINE 13 UNITS: 100 INJECTION, SOLUTION SUBCUTANEOUS at 20:42

## 2021-05-28 RX ADMIN — Medication 2 PUFF: at 20:01

## 2021-05-28 RX ADMIN — DEXTROSE MONOHYDRATE 75 MG: 50 INJECTION, SOLUTION INTRAVENOUS at 19:26

## 2021-05-28 RX ADMIN — FUROSEMIDE 20 MG: 10 INJECTION, SOLUTION INTRAMUSCULAR; INTRAVENOUS at 18:27

## 2021-05-28 RX ADMIN — Medication 400 MG: at 20:25

## 2021-05-28 RX ADMIN — TAMSULOSIN HYDROCHLORIDE 0.4 MG: 0.4 CAPSULE ORAL at 20:26

## 2021-05-28 RX ADMIN — INSULIN LISPRO 5 UNITS: 100 INJECTION, SOLUTION INTRAVENOUS; SUBCUTANEOUS at 16:53

## 2021-05-28 RX ADMIN — INSULIN LISPRO 3 UNITS: 100 INJECTION, SOLUTION INTRAVENOUS; SUBCUTANEOUS at 16:52

## 2021-05-28 RX ADMIN — ATORVASTATIN CALCIUM 40 MG: 40 TABLET, FILM COATED ORAL at 20:26

## 2021-05-28 RX ADMIN — ACETAMINOPHEN 1000 MG: 500 TABLET ORAL at 20:24

## 2021-05-28 RX ADMIN — Medication 400 MG: at 08:56

## 2021-05-28 RX ADMIN — ALBUTEROL SULFATE 2.5 MG: 2.5 SOLUTION RESPIRATORY (INHALATION) at 20:01

## 2021-05-28 RX ADMIN — ASPIRIN 325 MG: 325 TABLET, COATED ORAL at 08:56

## 2021-05-28 RX ADMIN — INSULIN LISPRO 3 UNITS: 100 INJECTION, SOLUTION INTRAVENOUS; SUBCUTANEOUS at 12:49

## 2021-05-28 RX ADMIN — INSULIN LISPRO 3 UNITS: 100 INJECTION, SOLUTION INTRAVENOUS; SUBCUTANEOUS at 20:42

## 2021-05-28 RX ADMIN — FUROSEMIDE 20 MG: 10 INJECTION, SOLUTION INTRAMUSCULAR; INTRAVENOUS at 08:57

## 2021-05-28 RX ADMIN — STANDARDIZED SENNA CONCENTRATE AND DOCUSATE SODIUM 1 TABLET: 8.6; 5 TABLET ORAL at 20:27

## 2021-05-28 RX ADMIN — STANDARDIZED SENNA CONCENTRATE AND DOCUSATE SODIUM 1 TABLET: 8.6; 5 TABLET ORAL at 08:55

## 2021-05-28 RX ADMIN — DEXTROSE MONOHYDRATE 150 MG: 50 INJECTION, SOLUTION INTRAVENOUS at 09:32

## 2021-05-28 RX ADMIN — OXYCODONE 10 MG: 5 TABLET ORAL at 10:04

## 2021-05-28 RX ADMIN — METOPROLOL TARTRATE 12.5 MG: 25 TABLET, FILM COATED ORAL at 20:25

## 2021-05-28 RX ADMIN — Medication 2 PUFF: at 12:41

## 2021-05-28 RX ADMIN — AMIODARONE HYDROCHLORIDE 400 MG: 200 TABLET ORAL at 20:27

## 2021-05-28 RX ADMIN — PANTOPRAZOLE SODIUM 40 MG: 40 TABLET, DELAYED RELEASE ORAL at 08:56

## 2021-05-28 RX ADMIN — OXYCODONE 10 MG: 5 TABLET ORAL at 05:32

## 2021-05-28 RX ADMIN — MUPIROCIN: 20 OINTMENT TOPICAL at 09:11

## 2021-05-28 RX ADMIN — AMIODARONE HYDROCHLORIDE 0.5 MG/MIN: 50 INJECTION, SOLUTION INTRAVENOUS at 18:32

## 2021-05-28 RX ADMIN — Medication 10 ML: at 20:29

## 2021-05-28 RX ADMIN — LEVOFLOXACIN 750 MG: 5 INJECTION, SOLUTION INTRAVENOUS at 21:30

## 2021-05-28 RX ADMIN — MUPIROCIN: 20 OINTMENT TOPICAL at 20:30

## 2021-05-28 RX ADMIN — METOPROLOL TARTRATE 12.5 MG: 25 TABLET, FILM COATED ORAL at 08:55

## 2021-05-28 RX ADMIN — Medication 10 ML: at 20:31

## 2021-05-28 RX ADMIN — AMIODARONE HYDROCHLORIDE 1 MG/MIN: 50 INJECTION, SOLUTION INTRAVENOUS at 10:02

## 2021-05-28 ASSESSMENT — PAIN SCALES - GENERAL
PAINLEVEL_OUTOF10: 10
PAINLEVEL_OUTOF10: 8
PAINLEVEL_OUTOF10: 9
PAINLEVEL_OUTOF10: 9
PAINLEVEL_OUTOF10: 8
PAINLEVEL_OUTOF10: 0
PAINLEVEL_OUTOF10: 0
PAINLEVEL_OUTOF10: 9

## 2021-05-28 ASSESSMENT — PAIN DESCRIPTION - PAIN TYPE
TYPE: ACUTE PAIN

## 2021-05-28 ASSESSMENT — PAIN DESCRIPTION - ORIENTATION
ORIENTATION: LEFT
ORIENTATION: LEFT
ORIENTATION: LEFT;LOWER

## 2021-05-28 ASSESSMENT — PAIN DESCRIPTION - DIRECTION: RADIATING_TOWARDS: MID

## 2021-05-28 ASSESSMENT — PAIN DESCRIPTION - LOCATION
LOCATION: BACK

## 2021-05-28 NOTE — PROGRESS NOTES
Cardiothoracic Surgery Progress Note    CC: s/p CABG X 4, SANDRO  POD# 3     Subjective:  Hemodynamically stable, 2 L O2 NC. Alert and oriented X 3. Patient c/o level 9/10 left lower flank pain that worsens when walking. Patient went into rapid atrial fibrillation this am-'s, asymptomatic. Weight down from yesterday. WT:89.8 kg/91.9 kg   pre-op 84.7 kg    Vital Signs:   Vitals:    05/28/21 0915   BP: 110/61   Pulse: 156   Resp: 20   Temp: 97 °F (36.1 °C)   SpO2: 94      Gtts: amiodarone gtt    Physical Exam:   Cardiac:  Atrial fibrillation   Lungs: clear to auscultation, decreased bases bilaterally  Abdomen:   No BM  Vascular:  pulses all palpable   Extremities: generalized, non-pitting edema 1+  :  /515/450   Lasix 20 mg IVP BID  Chest Tube(s) & Incision(s):    Sternum: stable, edges approximated, no drainage  Chest tube site: C/D/I    Left leg incision:  Edges well approximated, no drainage       Labs:   CBC:   Recent Labs     05/26/21  0405 05/28/21  0417   WBC 10.0 10.0   HGB 9.2* 8.8*   HCT 28.4* 27.0*   * 131*     BMP:   Recent Labs     05/26/21  0405 05/27/21  0431 05/28/21  0417   K 5.0 4.8 4.4   CREATININE 1.0  --  1.2   CALCIUM 8.8  --  8.7   MG 2.10 1.80 2.00       Assessment/Plan:  As per CC:     Plan:   -Urethral stricture- urology on board, will leave arana 3 days per urology recommendations. Will need follow-up with urology as outpatient  -AAA 4.8 cm per CT (4.3 cm 2019). Will need follow-up with Dr. Isai Peralta as outpatient. -DM2- SSI, prandial, and lantus insulin ordered.   Will wait to start home glucophage 1000 mg BID d/t creatinine  -Pain left lower back - lidocaine patch ordered   -Aggressive IS  -Diuresis - strict I/O, fluid restriction.   -Wean oxygen  -Ambulate  -Laxative of choice      Disp:  Plan for home with home care     Electronically signed by CHARLOTTE Gallardo CNP on 5/28/2021 at 10:01 AM

## 2021-05-28 NOTE — PLAN OF CARE
Problem: Falls - Risk of:  Goal: Will remain free from falls  Description: Will remain free from falls  Outcome: Ongoing     Problem: Bleeding:  Goal: Will show no signs and symptoms of excessive bleeding  Description: Will show no signs and symptoms of excessive bleeding  Outcome: Ongoing     Problem: Pain:  Goal: Control of acute pain  Description: Control of acute pain  Outcome: Ongoing     Problem:  Activity Intolerance:  Goal: Ability to tolerate increased activity will improve  Description: Ability to tolerate increased activity will improve  Outcome: Ongoing

## 2021-05-28 NOTE — CARE COORDINATION
Андрей received a referral to this patient for a rollator. Pt stated that he does not need the rollator because his wife has one for him at this time. He does not want to pay for a walker. Notified the patient that typically his insurance will cover 80% of the walker and the co-pay usually runs between 10-15 dollars.   Electronically signed by Indira Rogers on 5/28/2021 at 3:45 PM

## 2021-05-28 NOTE — PROGRESS NOTES
Output 1295 ml   Net -1055 ml       Labs:  Lab Results   Component Value Date    WBC 10.0 05/28/2021    HGB 8.8 (L) 05/28/2021    HCT 27.0 (L) 05/28/2021    MCV 73.0 (L) 05/28/2021     (L) 05/28/2021     Lab Results   Component Value Date    CREATININE 1.2 05/28/2021    BUN 19 05/28/2021     (L) 05/28/2021    K 4.4 05/28/2021    CL 99 05/28/2021    CO2 26 05/28/2021       Ulyssestung Benitez, APRN - CNP   5/28/2021

## 2021-05-28 NOTE — CARE COORDINATION
CM spoke with patient about the recommendations for a shower chair, this is not covered by insurance. CM advised if patient wants to obtain one his family could check [address] or [address]. Patient states understanding.     FERMIN MandelN, CCM, RN  [facility]  [phone]

## 2021-05-28 NOTE — PROGRESS NOTES
Physical Therapy  Facility/Department: [facility]  Daily Treatment Note  NAME: Marylin King  : 1960  MRN: [id number]    Date of Service: 2021    Discharge Recommendations:  Marylin King scored a 16/24 on the AM-PAC short mobility form. Current research shows that an AM-PAC score of 18 or greater is typically associated with a discharge to the patient's home setting. Based on the patient's AM-PAC score and their current functional mobility deficits, it is recommended that the patient have 2-3 sessions per week of Physical Therapy at d/c to increase the patient's independence. At this time, this patient demonstrates the endurance and safety to discharge home with home services and a follow up treatment frequency of 2-3x/wk. Please see assessment section for further patient specific details. If patient discharges prior to next session this note will serve as a discharge summary. Please see below for the latest assessment towards goals. 2-3 sessions per week, S Level 1   PT Equipment Recommendations  Equipment Needed: Yes  Mobility Devices: Tomas Suresh: Rollator (4 Wheeled)  Other: The pt will benefit from a 4WW due to SOB, pain and fatigue with gait s/p CABG. He needs frequent seated rest breaks    Assessment   Body structures, Functions, Activity limitations: Decreased functional mobility ; Decreased strength;Decreased endurance;Decreased balance; Increased pain;Decreased safe awareness  Assessment: The pt presents with decreased balance, strength and activity tolerance s/p CABG. This impairs his ability to complete transfers and gait idependently at this time. Transfers and ambulates CGA with rollator requiring occassional cues for safety due to decreased problem solving. He has good family support. Prognosis: Good  Decision Making: Medium Complexity  PT Education: PT Role;Plan of Care;Transfer Training;General Safety;Gait Training;Precautions;Goals; Equipment; Functional Mobility Training  Patient Education: pt verbalized understanding  Barriers to Learning: none  REQUIRES PT FOLLOW UP: Yes  Activity Tolerance  Activity Tolerance: Patient limited by pain; Patient Tolerated treatment well;Patient limited by fatigue     Patient Diagnosis(es): There were no encounter diagnoses. has a past medical history of AAA (abdominal aortic aneurysm) ([facility] [address] Aneurysm of descending thoracic aorta ([facility] [address] Arthritis, Atherosclerosis of aorta ([facility] [address] Back pain, Diabetes mellitus ([facility] [address] Diverticulosis, Elevated PSA, Hyperlipidemia, Hypertension, and Polyneuropathy associated with underlying disease ([facility] [address] has a past surgical history that includes Upper gastrointestinal endoscopy (11/30/15); Urethra dilation (5/2017, 7/2017); Cardiac catheterization; Upper gastrointestinal endoscopy (N/A, 10/12/2018); Coronary artery bypass graft (N/A, 5/25/2021); and Cystoscopy (5/25/2021). Restrictions  Restrictions/Precautions  Restrictions/Precautions: Fall Risk, Cardiac (medium fall risk  )  Position Activity Restriction  Sternal Precautions: No Pushing, No Pulling, 5# Lifting Restrictions  Other position/activity restrictions: CABG x4, SANDRO ligation 5/25/2021  Subjective   General  Chart Reviewed: Yes  Family / Caregiver Present: Yes (spouse)  Subjective  Subjective: pt reports L side pain without numerical value. RN aware and meds recently provided according to patient. General Comment  Comments: pt standing with RN upon arrival. Agreeable to PT.           Orientation  Orientation  Overall Orientation Status: Within Functional Limits  Cognition      Objective      Transfers  Sit to Stand: Contact guard assistance (4x)  Stand to sit: Contact guard assistance;Minimal Assistance (3x CGA, 1x Min A)  Comment: VC for use of pillow, body positioning, and weight shifts for scooting prior to stand  Ambulation  Ambulation?: Yes  Ambulation 1  Surface: level tile  Device: Rollator  Other Apparatus: O2 (2L)  Assistance: Contact guard assistance;Stand by assistance  Quality of Gait: varying reji, VC for upright posture  Gait Deviations: Decreased step height;Decreased step length  Distance: ~200'  Comments: pt stopped and sat during ambulation due to back/flank pain, VC and demonstration for use of rollator brakes  Stairs/Curb  Stairs?: No (pt refused)     Balance  Posture: Good  Sitting - Static: Good  Sitting - Dynamic: Good  Standing - Static: Good;-  Standing - Dynamic: Fair;+  Comments: no LOB            Comment: educated on out of bed mobility and ambulating with staff; educated on sternal precautions - pt verbalized understanding        AM-PAC Score  AM-PAC Inpatient Mobility Raw Score : 16 (05/28/21 1207)  AM-PAC Inpatient T-Scale Score : 40.78 (05/28/21 1207)  Mobility Inpatient CMS 0-100% Score: 54.16 (05/28/21 1207)  Mobility Inpatient CMS G-Code Modifier : CK (05/28/21 1207)          Goals  Short term goals  Time Frame for Short term goals: To be met prior to Dc  Short term goal 1: Pt will perform bed mobility with min A  Short term goal 2: Pt will perform sit to/from stand with supervision  Short term goal 3: Pt will ambulate 300' with 6SB and supervision  Short term goal 4: Pt will ascend/descend 3 stairs with AAD and CGA  Patient Goals   Patient goals : return home with wife, pt plans to retire  *no goals met    Plan    Plan  Times per week: 3-5  Current Treatment Recommendations: Strengthening, Balance Training, Functional Mobility Training, Transfer Training, Endurance Training, Stair training, Gait Training, Neuromuscular Re-education, Safety Education & Training  Safety Devices  Type of devices:  All fall risk precautions in place, Call light within reach, Chair alarm in place, Nurse notified, Left in chair     Therapy Time   Individual Concurrent Group Co-treatment   Time In 1127         Time Out 1205         Minutes 38         Timed Code Treatment Minutes: [address], [address]       I agree with the above note. Goals addressed by RAJESH Salazar, [place], [place] [address]

## 2021-05-29 LAB
GLUCOSE BLD-MCNC: 115 MG/DL (ref 70–99)
GLUCOSE BLD-MCNC: 128 MG/DL (ref 70–99)
GLUCOSE BLD-MCNC: 131 MG/DL (ref 70–99)
GLUCOSE BLD-MCNC: 148 MG/DL (ref 70–99)
MAGNESIUM: 2.2 MG/DL (ref 1.8–2.4)
PERFORMED ON: ABNORMAL

## 2021-05-29 PROCEDURE — 6370000000 HC RX 637 (ALT 250 FOR IP): Performed by: INTERNAL MEDICINE

## 2021-05-29 PROCEDURE — 6370000000 HC RX 637 (ALT 250 FOR IP): Performed by: THORACIC SURGERY (CARDIOTHORACIC VASCULAR SURGERY)

## 2021-05-29 PROCEDURE — 2700000000 HC OXYGEN THERAPY PER DAY

## 2021-05-29 PROCEDURE — 2000000000 HC ICU R&B

## 2021-05-29 PROCEDURE — 2580000003 HC RX 258: Performed by: INTERNAL MEDICINE

## 2021-05-29 PROCEDURE — 6360000002 HC RX W HCPCS: Performed by: THORACIC SURGERY (CARDIOTHORACIC VASCULAR SURGERY)

## 2021-05-29 PROCEDURE — 94669 MECHANICAL CHEST WALL OSCILL: CPT

## 2021-05-29 PROCEDURE — 94761 N-INVAS EAR/PLS OXIMETRY MLT: CPT

## 2021-05-29 PROCEDURE — 6360000002 HC RX W HCPCS: Performed by: NURSE PRACTITIONER

## 2021-05-29 PROCEDURE — 6370000000 HC RX 637 (ALT 250 FOR IP): Performed by: NURSE PRACTITIONER

## 2021-05-29 PROCEDURE — 99024 POSTOP FOLLOW-UP VISIT: CPT | Performed by: THORACIC SURGERY (CARDIOTHORACIC VASCULAR SURGERY)

## 2021-05-29 PROCEDURE — 94640 AIRWAY INHALATION TREATMENT: CPT

## 2021-05-29 PROCEDURE — 83735 ASSAY OF MAGNESIUM: CPT

## 2021-05-29 RX ADMIN — MUPIROCIN: 20 OINTMENT TOPICAL at 20:31

## 2021-05-29 RX ADMIN — MUPIROCIN: 20 OINTMENT TOPICAL at 09:59

## 2021-05-29 RX ADMIN — TRAMADOL HYDROCHLORIDE 50 MG: 50 TABLET, FILM COATED ORAL at 01:56

## 2021-05-29 RX ADMIN — OXYCODONE 10 MG: 5 TABLET ORAL at 11:44

## 2021-05-29 RX ADMIN — INSULIN LISPRO 3 UNITS: 100 INJECTION, SOLUTION INTRAVENOUS; SUBCUTANEOUS at 13:15

## 2021-05-29 RX ADMIN — OXYCODONE 10 MG: 5 TABLET ORAL at 20:27

## 2021-05-29 RX ADMIN — FONDAPARINUX SODIUM 2.5 MG: 2.5 INJECTION SUBCUTANEOUS at 09:56

## 2021-05-29 RX ADMIN — Medication 400 MG: at 20:28

## 2021-05-29 RX ADMIN — TAMSULOSIN HYDROCHLORIDE 0.4 MG: 0.4 CAPSULE ORAL at 20:28

## 2021-05-29 RX ADMIN — ACETAMINOPHEN 1000 MG: 500 TABLET ORAL at 13:11

## 2021-05-29 RX ADMIN — DIPHENHYDRAMINE HYDROCHLORIDE 25 MG: 25 TABLET ORAL at 22:09

## 2021-05-29 RX ADMIN — ASPIRIN 325 MG: 325 TABLET, COATED ORAL at 09:44

## 2021-05-29 RX ADMIN — Medication 30 ML: at 20:36

## 2021-05-29 RX ADMIN — Medication 2 PUFF: at 20:20

## 2021-05-29 RX ADMIN — INSULIN LISPRO 5 UNITS: 100 INJECTION, SOLUTION INTRAVENOUS; SUBCUTANEOUS at 13:13

## 2021-05-29 RX ADMIN — STANDARDIZED SENNA CONCENTRATE AND DOCUSATE SODIUM 1 TABLET: 8.6; 5 TABLET ORAL at 09:43

## 2021-05-29 RX ADMIN — Medication 400 MG: at 09:44

## 2021-05-29 RX ADMIN — AMIODARONE HYDROCHLORIDE 400 MG: 200 TABLET ORAL at 09:44

## 2021-05-29 RX ADMIN — MAGNESIUM HYDROXIDE 30 ML: 400 SUSPENSION ORAL at 15:49

## 2021-05-29 RX ADMIN — METOPROLOL TARTRATE 12.5 MG: 25 TABLET, FILM COATED ORAL at 13:02

## 2021-05-29 RX ADMIN — Medication 2 PUFF: at 09:23

## 2021-05-29 RX ADMIN — ATORVASTATIN CALCIUM 40 MG: 40 TABLET, FILM COATED ORAL at 20:27

## 2021-05-29 RX ADMIN — FUROSEMIDE 20 MG: 10 INJECTION, SOLUTION INTRAMUSCULAR; INTRAVENOUS at 18:28

## 2021-05-29 RX ADMIN — OXYCODONE 10 MG: 5 TABLET ORAL at 04:37

## 2021-05-29 RX ADMIN — METOPROLOL TARTRATE 12.5 MG: 25 TABLET, FILM COATED ORAL at 09:43

## 2021-05-29 RX ADMIN — METOPROLOL TARTRATE 25 MG: 25 TABLET, FILM COATED ORAL at 20:27

## 2021-05-29 RX ADMIN — AMIODARONE HYDROCHLORIDE 400 MG: 200 TABLET ORAL at 20:27

## 2021-05-29 RX ADMIN — FUROSEMIDE 20 MG: 10 INJECTION, SOLUTION INTRAMUSCULAR; INTRAVENOUS at 09:49

## 2021-05-29 RX ADMIN — ALBUTEROL SULFATE 2.5 MG: 2.5 SOLUTION RESPIRATORY (INHALATION) at 20:20

## 2021-05-29 RX ADMIN — STANDARDIZED SENNA CONCENTRATE AND DOCUSATE SODIUM 1 TABLET: 8.6; 5 TABLET ORAL at 20:28

## 2021-05-29 RX ADMIN — ACETAMINOPHEN 1000 MG: 500 TABLET ORAL at 01:55

## 2021-05-29 RX ADMIN — ALBUTEROL SULFATE 2.5 MG: 2.5 SOLUTION RESPIRATORY (INHALATION) at 09:23

## 2021-05-29 RX ADMIN — LEVOFLOXACIN 750 MG: 5 INJECTION, SOLUTION INTRAVENOUS at 20:44

## 2021-05-29 RX ADMIN — ACETAMINOPHEN 1000 MG: 500 TABLET ORAL at 20:28

## 2021-05-29 RX ADMIN — INSULIN GLARGINE 13 UNITS: 100 INJECTION, SOLUTION SUBCUTANEOUS at 20:31

## 2021-05-29 RX ADMIN — POLYETHYLENE GLYCOL 3350 17 G: 17 POWDER, FOR SOLUTION ORAL at 09:45

## 2021-05-29 RX ADMIN — PANTOPRAZOLE SODIUM 40 MG: 40 TABLET, DELAYED RELEASE ORAL at 09:44

## 2021-05-29 ASSESSMENT — PAIN SCALES - GENERAL
PAINLEVEL_OUTOF10: 8
PAINLEVEL_OUTOF10: 0
PAINLEVEL_OUTOF10: 8
PAINLEVEL_OUTOF10: 9

## 2021-05-29 ASSESSMENT — PAIN DESCRIPTION - ORIENTATION: ORIENTATION: RIGHT;MID

## 2021-05-29 ASSESSMENT — PAIN DESCRIPTION - LOCATION
LOCATION: CHEST
LOCATION: CHEST

## 2021-05-29 ASSESSMENT — PAIN DESCRIPTION - PAIN TYPE
TYPE: SURGICAL PAIN
TYPE: SURGICAL PAIN

## 2021-05-29 NOTE — PLAN OF CARE
Problem: Pain:  Goal: Pain level will decrease  Description: Pain level will decrease  Outcome: Ongoing  Goal: Control of acute pain  Description: Control of acute pain  Outcome: Ongoing     Problem: Pain:  Goal: Pain level will decrease  Description: Pain level will decrease  Outcome: Ongoing  Medicated for level 9 pain patient pointing to sternal area. Patient in bed resting at this time.

## 2021-05-29 NOTE — PROGRESS NOTES
Progress Note    S/P cabgx4, SANDRO exclusion 5/28/21    Vital Signs:                                                 BP (!) 120/49   Pulse 83   Temp 96.8 °F (36 °C) (Temporal)   Resp 18   Ht 5' 9\" (1.753 m)   Wt 197 lb 8.5 oz (89.6 kg)   SpO2 95%   BMI 29.17 kg/m²  O2 Flow Rate (L/min): 2 L/min   SR  Admission Weight: 192 lb (87.1 kg)  preop 84.7    I/O:      Intake/Output Summary (Last 24 hours) at 5/29/2021 1039  Last data filed at 5/29/2021 0600  Gross per 24 hour   Intake 265.24 ml   Output 1315 ml   Net -1049.76 ml     CV: reg, wound c/d/i  Pulm: decreased  Abd: soft  Ext: warm, no edema    Data Review:  CBC:   Recent Labs     05/28/21  0417   WBC 10.0   HGB 8.8*   HCT 27.0*   MCV 73.0*   *     BMP:   Recent Labs     05/27/21  0431 05/28/21  0417 05/29/21  0450   NA  --  132*  --    K 4.8 4.4  --    CL  --  99  --    CO2  --  26  --    BUN  --  19  --    CREATININE  --  1.2  --    CALCIUM  --  8.7  --    MG 1.80 2.00 2.20     Cardiac Enzymes: No results for input(s): CKTOTAL, CKMB, CKMBINDEX, TROPONINI in the last 72 hours. PT/INR: No results for input(s): PROTIME, INR in the last 72 hours. APTT: No results for input(s): APTT in the last 72 hours. Assessment/Plan:  CV - SR. AF 5/28. Amio.   - BB-increase, ACE   - statin  pulm - pulm toilet, wean O2  Renal - recheck Cr tomorrow   - urethral stricture. Flomax. retain Chapa for now. Per urology but may have to consider voiding trial at some point the next couple of days since likely would be ready for d/c early next week. ID - abx for preop UTI 5/24. Has completed 5 days course. Could stop at this point, but will ask urology service opinion given stricture/catheter. Heme - acute blood loss anemia.    -    - marquisxtmaria isabel Osorio MD  5/29/2021  10:39 AM

## 2021-05-29 NOTE — PROGRESS NOTES
Urology Progress Note  [facility]    Provider: Enzo Leonard MD MD Patient ID:  Admission Date: 2021 Name: Syd Valentin  OR Date: 2021 MRN: [id number]   Patient Location: [facility]-3478/6150-22 : 1960  Attending: Socorro Brenner MD Date of Service: 2021  PCP: Ousmane Tejeda MD     Diagnoses:  Urinary retention  Urethral stricture  Coronary artery disease    Assessment/Plan: Chapa catheter removal in the morning. Flomax  He will need follow-up with a urethral reconstructive specialist upon discharge. Urology will follow    The patient had a chance to ask questions which were answered. he understands the above plan. Subjective:   Syd Valentin is a 59 y.o. male. He was seen and examined this morning. Today we discussed voiding trial tomorrow morning. Objective:   Vitals:  Vitals:    21 1302   BP: (!) 122/55   Pulse: 90   Resp:    Temp:    SpO2:        Intake/Output Summary (Last 24 hours) at 2021 1405  Last data filed at 2021 1145  Gross per 24 hour   Intake 505.24 ml   Output 1235 ml   Net -729.76 ml     Physical Exam:  Gen: Alert and oriented x3, no acute distress  CV: Regular rate   Resp: unlabored respirations  Abd: Soft, non-distended, non-tender, no masses  Ext: no peripheral edema noted, moves upper and lower extremities spontaneously  Skin: warm and well perfused, no rashes noted on the face, or arms.    : Chapa catheter in good position with clear yellow urine in the tubing    Labs:  Lab Results   Component Value Date    WBC 10.0 2021    HGB 8.8 (L) 2021    HCT 27.0 (L) 2021    MCV 73.0 (L) 2021     (L) 2021     Lab Results   Component Value Date    CREATININE 1.2 2021    BUN 19 2021     (L) 2021    K 4.4 2021    CL 99 2021    CO2 26 2021       Enzo Leonard MD MD  2021

## 2021-05-30 LAB
ANION GAP SERPL CALCULATED.3IONS-SCNC: 11 MMOL/L (ref 3–16)
BUN BLDV-MCNC: 23 MG/DL (ref 7–20)
CALCIUM SERPL-MCNC: 8.8 MG/DL (ref 8.3–10.6)
CHLORIDE BLD-SCNC: 96 MMOL/L (ref 99–110)
CO2: 28 MMOL/L (ref 21–32)
CREAT SERPL-MCNC: 1.1 MG/DL (ref 0.8–1.3)
GFR AFRICAN AMERICAN: >60
GFR NON-AFRICAN AMERICAN: >60
GLUCOSE BLD-MCNC: 103 MG/DL (ref 70–99)
GLUCOSE BLD-MCNC: 128 MG/DL (ref 70–99)
GLUCOSE BLD-MCNC: 136 MG/DL (ref 70–99)
GLUCOSE BLD-MCNC: 158 MG/DL (ref 70–99)
GLUCOSE BLD-MCNC: 98 MG/DL (ref 70–99)
HCT VFR BLD CALC: 26.2 % (ref 40.5–52.5)
HEMOGLOBIN: 8.4 G/DL (ref 13.5–17.5)
MAGNESIUM: 2.5 MG/DL (ref 1.8–2.4)
MCH RBC QN AUTO: 23.6 PG (ref 26–34)
MCHC RBC AUTO-ENTMCNC: 31.9 G/DL (ref 31–36)
MCV RBC AUTO: 73.9 FL (ref 80–100)
PDW BLD-RTO: 19.9 % (ref 12.4–15.4)
PERFORMED ON: ABNORMAL
PERFORMED ON: NORMAL
PLATELET # BLD: 183 K/UL (ref 135–450)
PMV BLD AUTO: 8.9 FL (ref 5–10.5)
POTASSIUM SERPL-SCNC: 4.1 MMOL/L (ref 3.5–5.1)
RBC # BLD: 3.54 M/UL (ref 4.2–5.9)
SODIUM BLD-SCNC: 135 MMOL/L (ref 136–145)
WBC # BLD: 8.6 K/UL (ref 4–11)

## 2021-05-30 PROCEDURE — 94640 AIRWAY INHALATION TREATMENT: CPT

## 2021-05-30 PROCEDURE — 2580000003 HC RX 258: Performed by: INTERNAL MEDICINE

## 2021-05-30 PROCEDURE — 2000000000 HC ICU R&B

## 2021-05-30 PROCEDURE — 6370000000 HC RX 637 (ALT 250 FOR IP): Performed by: INTERNAL MEDICINE

## 2021-05-30 PROCEDURE — 94669 MECHANICAL CHEST WALL OSCILL: CPT

## 2021-05-30 PROCEDURE — 2700000000 HC OXYGEN THERAPY PER DAY

## 2021-05-30 PROCEDURE — 2580000003 HC RX 258: Performed by: THORACIC SURGERY (CARDIOTHORACIC VASCULAR SURGERY)

## 2021-05-30 PROCEDURE — 6370000000 HC RX 637 (ALT 250 FOR IP): Performed by: NURSE PRACTITIONER

## 2021-05-30 PROCEDURE — 6370000000 HC RX 637 (ALT 250 FOR IP): Performed by: THORACIC SURGERY (CARDIOTHORACIC VASCULAR SURGERY)

## 2021-05-30 PROCEDURE — 99024 POSTOP FOLLOW-UP VISIT: CPT | Performed by: THORACIC SURGERY (CARDIOTHORACIC VASCULAR SURGERY)

## 2021-05-30 PROCEDURE — 83735 ASSAY OF MAGNESIUM: CPT

## 2021-05-30 PROCEDURE — 36592 COLLECT BLOOD FROM PICC: CPT

## 2021-05-30 PROCEDURE — 94761 N-INVAS EAR/PLS OXIMETRY MLT: CPT

## 2021-05-30 PROCEDURE — 85027 COMPLETE CBC AUTOMATED: CPT

## 2021-05-30 PROCEDURE — 6360000002 HC RX W HCPCS: Performed by: THORACIC SURGERY (CARDIOTHORACIC VASCULAR SURGERY)

## 2021-05-30 PROCEDURE — 80048 BASIC METABOLIC PNL TOTAL CA: CPT

## 2021-05-30 RX ORDER — DIAPER,BRIEF,INFANT-TODD,DISP
EACH MISCELLANEOUS 4 TIMES DAILY PRN
Status: DISCONTINUED | OUTPATIENT
Start: 2021-05-30 | End: 2021-06-02 | Stop reason: HOSPADM

## 2021-05-30 RX ADMIN — AMIODARONE HYDROCHLORIDE 400 MG: 200 TABLET ORAL at 20:46

## 2021-05-30 RX ADMIN — Medication 400 MG: at 08:01

## 2021-05-30 RX ADMIN — ACETAMINOPHEN 1000 MG: 500 TABLET ORAL at 04:24

## 2021-05-30 RX ADMIN — ASPIRIN 325 MG: 325 TABLET, COATED ORAL at 08:01

## 2021-05-30 RX ADMIN — Medication 10 ML: at 08:12

## 2021-05-30 RX ADMIN — HYDROCORTISONE: 1 CREAM TOPICAL at 20:48

## 2021-05-30 RX ADMIN — ALBUTEROL SULFATE 2.5 MG: 2.5 SOLUTION RESPIRATORY (INHALATION) at 20:01

## 2021-05-30 RX ADMIN — ACETAMINOPHEN 1000 MG: 500 TABLET ORAL at 23:29

## 2021-05-30 RX ADMIN — ALBUTEROL SULFATE 2.5 MG: 2.5 SOLUTION RESPIRATORY (INHALATION) at 13:04

## 2021-05-30 RX ADMIN — FONDAPARINUX SODIUM 2.5 MG: 2.5 INJECTION SUBCUTANEOUS at 08:07

## 2021-05-30 RX ADMIN — INSULIN LISPRO 3 UNITS: 100 INJECTION, SOLUTION INTRAVENOUS; SUBCUTANEOUS at 12:57

## 2021-05-30 RX ADMIN — DIPHENHYDRAMINE HYDROCHLORIDE 25 MG: 25 TABLET ORAL at 20:46

## 2021-05-30 RX ADMIN — Medication 2 PUFF: at 20:04

## 2021-05-30 RX ADMIN — INSULIN GLARGINE 13 UNITS: 100 INJECTION, SOLUTION SUBCUTANEOUS at 20:56

## 2021-05-30 RX ADMIN — AMIODARONE HYDROCHLORIDE 400 MG: 200 TABLET ORAL at 08:01

## 2021-05-30 RX ADMIN — METOPROLOL TARTRATE 12.5 MG: 25 TABLET, FILM COATED ORAL at 11:38

## 2021-05-30 RX ADMIN — TRAMADOL HYDROCHLORIDE 50 MG: 50 TABLET, FILM COATED ORAL at 08:19

## 2021-05-30 RX ADMIN — Medication 10 ML: at 20:57

## 2021-05-30 RX ADMIN — ATORVASTATIN CALCIUM 40 MG: 40 TABLET, FILM COATED ORAL at 20:45

## 2021-05-30 RX ADMIN — TAMSULOSIN HYDROCHLORIDE 0.4 MG: 0.4 CAPSULE ORAL at 20:46

## 2021-05-30 RX ADMIN — METOPROLOL TARTRATE 25 MG: 25 TABLET, FILM COATED ORAL at 08:01

## 2021-05-30 RX ADMIN — Medication 30 ML: at 08:13

## 2021-05-30 RX ADMIN — Medication 30 ML: at 20:55

## 2021-05-30 RX ADMIN — HYDROCORTISONE: 1 CREAM TOPICAL at 16:32

## 2021-05-30 RX ADMIN — STANDARDIZED SENNA CONCENTRATE AND DOCUSATE SODIUM 1 TABLET: 8.6; 5 TABLET ORAL at 08:01

## 2021-05-30 RX ADMIN — FUROSEMIDE 20 MG: 10 INJECTION, SOLUTION INTRAMUSCULAR; INTRAVENOUS at 11:43

## 2021-05-30 RX ADMIN — ALBUTEROL SULFATE 2.5 MG: 2.5 SOLUTION RESPIRATORY (INHALATION) at 09:12

## 2021-05-30 RX ADMIN — METOPROLOL TARTRATE 37.5 MG: 25 TABLET, FILM COATED ORAL at 20:46

## 2021-05-30 RX ADMIN — LISINOPRIL 2.5 MG: 5 TABLET ORAL at 11:38

## 2021-05-30 RX ADMIN — FUROSEMIDE 20 MG: 10 INJECTION, SOLUTION INTRAMUSCULAR; INTRAVENOUS at 17:54

## 2021-05-30 RX ADMIN — PANTOPRAZOLE SODIUM 40 MG: 40 TABLET, DELAYED RELEASE ORAL at 08:01

## 2021-05-30 RX ADMIN — Medication 400 MG: at 20:46

## 2021-05-30 RX ADMIN — Medication 2 PUFF: at 09:12

## 2021-05-30 RX ADMIN — INSULIN LISPRO 5 UNITS: 100 INJECTION, SOLUTION INTRAVENOUS; SUBCUTANEOUS at 17:32

## 2021-05-30 RX ADMIN — TRAMADOL HYDROCHLORIDE 50 MG: 50 TABLET, FILM COATED ORAL at 20:46

## 2021-05-30 RX ADMIN — ACETAMINOPHEN 1000 MG: 500 TABLET ORAL at 15:37

## 2021-05-30 RX ADMIN — INSULIN LISPRO 5 UNITS: 100 INJECTION, SOLUTION INTRAVENOUS; SUBCUTANEOUS at 12:56

## 2021-05-30 RX ADMIN — OXYCODONE 10 MG: 5 TABLET ORAL at 03:49

## 2021-05-30 ASSESSMENT — PAIN SCALES - GENERAL
PAINLEVEL_OUTOF10: 4
PAINLEVEL_OUTOF10: 0
PAINLEVEL_OUTOF10: 8
PAINLEVEL_OUTOF10: 0
PAINLEVEL_OUTOF10: 2
PAINLEVEL_OUTOF10: 0
PAINLEVEL_OUTOF10: 9
PAINLEVEL_OUTOF10: 0
PAINLEVEL_OUTOF10: 5
PAINLEVEL_OUTOF10: 0
PAINLEVEL_OUTOF10: 8
PAINLEVEL_OUTOF10: 7
PAINLEVEL_OUTOF10: 9
PAINLEVEL_OUTOF10: 0

## 2021-05-30 ASSESSMENT — PAIN DESCRIPTION - PAIN TYPE: TYPE: SURGICAL PAIN

## 2021-05-30 ASSESSMENT — PAIN DESCRIPTION - PROGRESSION: CLINICAL_PROGRESSION: GRADUALLY IMPROVING

## 2021-05-30 ASSESSMENT — PAIN DESCRIPTION - FREQUENCY: FREQUENCY: INTERMITTENT

## 2021-05-30 ASSESSMENT — PAIN DESCRIPTION - DESCRIPTORS: DESCRIPTORS: ACHING

## 2021-05-30 ASSESSMENT — PAIN DESCRIPTION - LOCATION: LOCATION: CHEST

## 2021-05-30 ASSESSMENT — PAIN DESCRIPTION - ORIENTATION: ORIENTATION: RIGHT

## 2021-05-30 NOTE — FLOWSHEET NOTE
Chapa catheter removed per MD order. 10ml NS removed from balloon and withdrawn. tucker well. Urinal provided at bedside.

## 2021-05-30 NOTE — PROGRESS NOTES
Urology Progress Note  [facility]    Provider: Carissa Snell MD MD Patient ID:  Admission Date: 2021 Name: Antonio Johnson  OR Date: 2021 MRN: [id number]   Patient Location: [facility]2350/6698-16 : 1960  Attending: Clement Keene MD Date of Service: 2021  PCP: Annie Adler MD     Diagnoses:  Urinary retention  Urethral stricture  Coronary artery disease    Assessment/Plan: Chapa catheter removed and patient has noted to void and feels OK  Flomax  -bladder scan PRN  He will need follow-up with a urethral reconstructive specialist upon discharge. Urology will follow    The patient had a chance to ask questions which were answered. he understands the above plan. Subjective:   Antonio Johnson is a 59 y.o. male. He was seen and examined this morning. Today we discussed voiding trial and the fact that he has voiding and has no complaints about voiding currently. Objective:   Vitals:  Vitals:    21 0915   BP:    Pulse:    Resp:    Temp:    SpO2: 98%       Intake/Output Summary (Last 24 hours) at 2021 1351  Last data filed at 2021 1300  Gross per 24 hour   Intake 840 ml   Output 2418 ml   Net -1578 ml     Physical Exam:  Gen: Alert and oriented x3, no acute distress  CV: Regular rate   Resp: unlabored respirations  Abd: Soft, non-distended, non-tender, no masses  Ext: no peripheral edema noted, moves upper and lower extremities spontaneously  Skin: warm and well perfused, no rashes noted on the face, or arms.      Labs:  Lab Results   Component Value Date    WBC 8.6 2021    HGB 8.4 (L) 2021    HCT 26.2 (L) 2021    MCV 73.9 (L) 2021     2021     Lab Results   Component Value Date    CREATININE 1.1 2021    BUN 23 (H) 2021     (L) 2021    K 4.1 2021    CL 96 (L) 2021    CO2 28 2021       Carissa Snell MD MD  2021

## 2021-05-30 NOTE — PROGRESS NOTES
Progress Note    S/P cabgx4, SANDRO exclusion 5/28/21    Vital Signs:                                                 /63   Pulse 125   Temp 97.2 °F (36.2 °C) (Temporal)   Resp 16   Ht 5' 9\" (1.753 m)   Wt 194 lb 10.7 oz (88.3 kg)   SpO2 98%   BMI 28.75 kg/m²  O2 Flow Rate (L/min): 1.5 L/min   AF  Admission Weight: 192 lb (87.1 kg)  preop 84.7    I/O:      Intake/Output Summary (Last 24 hours) at 5/30/2021 0949  Last data filed at 5/30/2021 0600  Gross per 24 hour   Intake 480 ml   Output 2225 ml   Net -1745 ml     CV: reg, wound c/d/i  Pulm: decreased  Abd: soft  Ext: warm, no edema    Data Review:  CBC:   Recent Labs     05/28/21  0417 05/30/21  0333   WBC 10.0 8.6   HGB 8.8* 8.4*   HCT 27.0* 26.2*   MCV 73.0* 73.9*   * 183     BMP:   Recent Labs     05/28/21  0417 05/29/21  0450 05/30/21  0333   *  --  135*   K 4.4  --  4.1   CL 99  --  96*   CO2 26  --  28   BUN 19  --  23*   CREATININE 1.2  --  1.1   CALCIUM 8.7  --  8.8   MG 2.00 2.20 2.50*     Cardiac Enzymes: No results for input(s): CKTOTAL, CKMB, CKMBINDEX, TROPONINI in the last 72 hours. PT/INR: No results for input(s): PROTIME, INR in the last 72 hours. APTT: No results for input(s): APTT in the last 72 hours. Assessment/Plan:  CV - SR. AF 5/28. Amio.   - BB-increase, ACE   - statin  pulm - pulm toilet, wean O2  Renal - Cr nl   - urethral stricture. Flomax. Chapa removed at 6am. Further management per urology. ID - abx for preop UTI 5/24. Completed course. Wbc nl. D/c. Heme - acute blood loss anemia.    -    - samaria Amador MD  5/30/2021  9:49 AM

## 2021-05-31 LAB
GLUCOSE BLD-MCNC: 109 MG/DL (ref 70–99)
GLUCOSE BLD-MCNC: 135 MG/DL (ref 70–99)
GLUCOSE BLD-MCNC: 148 MG/DL (ref 70–99)
GLUCOSE BLD-MCNC: 150 MG/DL (ref 70–99)
GLUCOSE BLD-MCNC: 97 MG/DL (ref 70–99)
MAGNESIUM: 2.4 MG/DL (ref 1.8–2.4)
PERFORMED ON: ABNORMAL
PERFORMED ON: NORMAL
POC ACT LR: 169 SEC

## 2021-05-31 PROCEDURE — 94761 N-INVAS EAR/PLS OXIMETRY MLT: CPT

## 2021-05-31 PROCEDURE — 99024 POSTOP FOLLOW-UP VISIT: CPT | Performed by: THORACIC SURGERY (CARDIOTHORACIC VASCULAR SURGERY)

## 2021-05-31 PROCEDURE — 94669 MECHANICAL CHEST WALL OSCILL: CPT

## 2021-05-31 PROCEDURE — 6370000000 HC RX 637 (ALT 250 FOR IP): Performed by: INTERNAL MEDICINE

## 2021-05-31 PROCEDURE — 6360000002 HC RX W HCPCS: Performed by: THORACIC SURGERY (CARDIOTHORACIC VASCULAR SURGERY)

## 2021-05-31 PROCEDURE — 6370000000 HC RX 637 (ALT 250 FOR IP): Performed by: THORACIC SURGERY (CARDIOTHORACIC VASCULAR SURGERY)

## 2021-05-31 PROCEDURE — 83735 ASSAY OF MAGNESIUM: CPT

## 2021-05-31 PROCEDURE — 2700000000 HC OXYGEN THERAPY PER DAY

## 2021-05-31 PROCEDURE — 2580000003 HC RX 258: Performed by: THORACIC SURGERY (CARDIOTHORACIC VASCULAR SURGERY)

## 2021-05-31 PROCEDURE — 6370000000 HC RX 637 (ALT 250 FOR IP): Performed by: NURSE PRACTITIONER

## 2021-05-31 PROCEDURE — 2000000000 HC ICU R&B

## 2021-05-31 PROCEDURE — 94640 AIRWAY INHALATION TREATMENT: CPT

## 2021-05-31 PROCEDURE — 36592 COLLECT BLOOD FROM PICC: CPT

## 2021-05-31 PROCEDURE — 2580000003 HC RX 258: Performed by: INTERNAL MEDICINE

## 2021-05-31 RX ORDER — FUROSEMIDE 10 MG/ML
20 INJECTION INTRAMUSCULAR; INTRAVENOUS DAILY
Status: DISCONTINUED | OUTPATIENT
Start: 2021-06-01 | End: 2021-06-02 | Stop reason: HOSPADM

## 2021-05-31 RX ORDER — WARFARIN SODIUM 2.5 MG/1
2.5 TABLET ORAL
Status: COMPLETED | OUTPATIENT
Start: 2021-05-31 | End: 2021-05-31

## 2021-05-31 RX ORDER — METOPROLOL TARTRATE 50 MG/1
50 TABLET, FILM COATED ORAL 2 TIMES DAILY
Status: DISCONTINUED | OUTPATIENT
Start: 2021-05-31 | End: 2021-05-31

## 2021-05-31 RX ADMIN — STANDARDIZED SENNA CONCENTRATE AND DOCUSATE SODIUM 1 TABLET: 8.6; 5 TABLET ORAL at 08:17

## 2021-05-31 RX ADMIN — STANDARDIZED SENNA CONCENTRATE AND DOCUSATE SODIUM 1 TABLET: 8.6; 5 TABLET ORAL at 20:49

## 2021-05-31 RX ADMIN — OXYCODONE 10 MG: 5 TABLET ORAL at 21:05

## 2021-05-31 RX ADMIN — AMIODARONE HYDROCHLORIDE 400 MG: 200 TABLET ORAL at 20:50

## 2021-05-31 RX ADMIN — AMIODARONE HYDROCHLORIDE 400 MG: 200 TABLET ORAL at 08:17

## 2021-05-31 RX ADMIN — Medication 2 PUFF: at 20:18

## 2021-05-31 RX ADMIN — Medication 10 ML: at 21:06

## 2021-05-31 RX ADMIN — ALBUTEROL SULFATE 2.5 MG: 2.5 SOLUTION RESPIRATORY (INHALATION) at 15:57

## 2021-05-31 RX ADMIN — INSULIN LISPRO 5 UNITS: 100 INJECTION, SOLUTION INTRAVENOUS; SUBCUTANEOUS at 08:16

## 2021-05-31 RX ADMIN — METOPROLOL TARTRATE 50 MG: 50 TABLET, FILM COATED ORAL at 08:17

## 2021-05-31 RX ADMIN — METOPROLOL TARTRATE 37.5 MG: 25 TABLET, FILM COATED ORAL at 20:49

## 2021-05-31 RX ADMIN — Medication 400 MG: at 20:50

## 2021-05-31 RX ADMIN — INSULIN LISPRO 5 UNITS: 100 INJECTION, SOLUTION INTRAVENOUS; SUBCUTANEOUS at 12:18

## 2021-05-31 RX ADMIN — Medication 400 MG: at 08:17

## 2021-05-31 RX ADMIN — ALBUTEROL SULFATE 2.5 MG: 2.5 SOLUTION RESPIRATORY (INHALATION) at 08:48

## 2021-05-31 RX ADMIN — FUROSEMIDE 20 MG: 10 INJECTION, SOLUTION INTRAMUSCULAR; INTRAVENOUS at 08:26

## 2021-05-31 RX ADMIN — Medication 10 ML: at 08:34

## 2021-05-31 RX ADMIN — TRAMADOL HYDROCHLORIDE 50 MG: 50 TABLET, FILM COATED ORAL at 08:26

## 2021-05-31 RX ADMIN — INSULIN LISPRO 5 UNITS: 100 INJECTION, SOLUTION INTRAVENOUS; SUBCUTANEOUS at 16:45

## 2021-05-31 RX ADMIN — HYDROCORTISONE: 1 CREAM TOPICAL at 08:16

## 2021-05-31 RX ADMIN — ACETAMINOPHEN 1000 MG: 500 TABLET ORAL at 12:18

## 2021-05-31 RX ADMIN — FONDAPARINUX SODIUM 2.5 MG: 2.5 INJECTION SUBCUTANEOUS at 08:16

## 2021-05-31 RX ADMIN — INSULIN LISPRO 3 UNITS: 100 INJECTION, SOLUTION INTRAVENOUS; SUBCUTANEOUS at 12:19

## 2021-05-31 RX ADMIN — PANTOPRAZOLE SODIUM 40 MG: 40 TABLET, DELAYED RELEASE ORAL at 08:17

## 2021-05-31 RX ADMIN — ALBUTEROL SULFATE 2.5 MG: 2.5 SOLUTION RESPIRATORY (INHALATION) at 20:15

## 2021-05-31 RX ADMIN — Medication 2 PUFF: at 08:48

## 2021-05-31 RX ADMIN — INSULIN GLARGINE 13 UNITS: 100 INJECTION, SOLUTION SUBCUTANEOUS at 20:51

## 2021-05-31 RX ADMIN — ASPIRIN 325 MG: 325 TABLET, COATED ORAL at 08:17

## 2021-05-31 RX ADMIN — ACETAMINOPHEN 1000 MG: 500 TABLET ORAL at 08:17

## 2021-05-31 RX ADMIN — ALBUTEROL SULFATE 2.5 MG: 2.5 SOLUTION RESPIRATORY (INHALATION) at 12:05

## 2021-05-31 RX ADMIN — Medication 10 ML: at 08:26

## 2021-05-31 RX ADMIN — ATORVASTATIN CALCIUM 40 MG: 40 TABLET, FILM COATED ORAL at 20:49

## 2021-05-31 RX ADMIN — ACETAMINOPHEN 1000 MG: 500 TABLET ORAL at 18:27

## 2021-05-31 RX ADMIN — WARFARIN SODIUM 2.5 MG: 2.5 TABLET ORAL at 18:27

## 2021-05-31 RX ADMIN — OXYCODONE 10 MG: 5 TABLET ORAL at 01:29

## 2021-05-31 RX ADMIN — TAMSULOSIN HYDROCHLORIDE 0.4 MG: 0.4 CAPSULE ORAL at 20:49

## 2021-05-31 ASSESSMENT — PAIN SCALES - GENERAL
PAINLEVEL_OUTOF10: 0
PAINLEVEL_OUTOF10: 0
PAINLEVEL_OUTOF10: 1
PAINLEVEL_OUTOF10: 0
PAINLEVEL_OUTOF10: 6
PAINLEVEL_OUTOF10: 0
PAINLEVEL_OUTOF10: 3
PAINLEVEL_OUTOF10: 5
PAINLEVEL_OUTOF10: 9
PAINLEVEL_OUTOF10: 0
PAINLEVEL_OUTOF10: 0
PAINLEVEL_OUTOF10: 9
PAINLEVEL_OUTOF10: 2
PAINLEVEL_OUTOF10: 0

## 2021-05-31 ASSESSMENT — PAIN DESCRIPTION - LOCATION
LOCATION: CHEST
LOCATION: BACK

## 2021-05-31 ASSESSMENT — PAIN DESCRIPTION - DESCRIPTORS: DESCRIPTORS: DISCOMFORT

## 2021-05-31 ASSESSMENT — PAIN DESCRIPTION - PROGRESSION
CLINICAL_PROGRESSION: GRADUALLY IMPROVING
CLINICAL_PROGRESSION: GRADUALLY IMPROVING

## 2021-05-31 ASSESSMENT — PAIN DESCRIPTION - PAIN TYPE
TYPE: SURGICAL PAIN
TYPE: CHRONIC PAIN

## 2021-05-31 ASSESSMENT — PAIN DESCRIPTION - FREQUENCY: FREQUENCY: CONTINUOUS

## 2021-05-31 ASSESSMENT — PAIN DESCRIPTION - ONSET: ONSET: ON-GOING

## 2021-05-31 NOTE — PLAN OF CARE
Problem: Falls - Risk of:  Goal: Will remain free from falls  Description: Will remain free from falls  Outcome: Ongoing     Problem: Bleeding:  Goal: Will show no signs and symptoms of excessive bleeding  Description: Will show no signs and symptoms of excessive bleeding  Outcome: Ongoing     Problem: Pain:  Goal: Pain level will decrease  Description: Pain level will decrease  Outcome: Ongoing     Problem:  Activity Intolerance:  Goal: Able to perform prescribed physical activity  Description: Able to perform prescribed physical activity  Outcome: Ongoing     Problem: Anxiety:  Goal: Level of anxiety will decrease  Description: Level of anxiety will decrease  Outcome: Ongoing     Problem: Nutrition  Goal: Optimal nutrition therapy  Outcome: Ongoing

## 2021-05-31 NOTE — PROGRESS NOTES
Urology Progress Note  [facility]    Provider: Frances Quinonez MD MD Patient ID:  Admission Date: 2021 Name: Denisha Block  OR Date: 2021 MRN: [id number]   Patient Location: [facility]-[id number] : 1960  Attending: Dana Park MD Date of Service: 2021  PCP: Angie Dunlap MD     Diagnoses:  Urinary retention  Urethral stricture  Coronary artery disease    Assessment/Plan: Chapa catheter removed and patient has noted to void and feels OK  Flomax  -bladder scan PRN  He will need follow-up with a urethral reconstructive specialist upon discharge. Urology will sign off. Please call if further issues. The patient had a chance to ask questions which were answered. he understands the above plan. Subjective:   Denisha Block is a 59 y.o. male. He was seen and examined this morning. Today we discussed voiding trial and the fact that he has voiding and has no complaints about voiding currently. Objective:   Vitals:  Vitals:    21 0849   BP:    Pulse:    Resp: 18   Temp:    SpO2: 92%       Intake/Output Summary (Last 24 hours) at 2021 1139  Last data filed at 2021 1026  Gross per 24 hour   Intake 480 ml   Output 1433 ml   Net -953 ml     Physical Exam:  Gen: Alert and oriented x3, no acute distress  CV: Regular rate   Resp: unlabored respirations  Abd: Soft, non-distended, non-tender, no masses  Ext: no peripheral edema noted, moves upper and lower extremities spontaneously  Skin: warm and well perfused, no rashes noted on the face, or arms.      Labs:  Lab Results   Component Value Date    WBC 8.6 2021    HGB 8.4 (L) 2021    HCT 26.2 (L) 2021    MCV 73.9 (L) 2021     2021     Lab Results   Component Value Date    CREATININE 1.1 2021    BUN 23 (H) 2021     (L) 2021    K 4.1 2021    CL 96 (L) 2021    CO2 28 2021       Frances Quinonez MD MD  2021

## 2021-05-31 NOTE — PROGRESS NOTES
Assessed patient; see flow sheet; patient up in chair c/o back itching; applied hydrocortisone cream.. Patient requests to walk the unit this evening  and go to bed; wants to shower in the AM.

## 2021-05-31 NOTE — FLOWSHEET NOTE
Spoke with Dr. Tia Borrego re: hypotension and near syncope this am after administration of increased beta blocker dose. Wants to decrease metoprolol dose to 37.5mg and decrease lasix to QD as pt was at admission weight this am. Pt now -750cc again today.  Will re-eval in am

## 2021-05-31 NOTE — FLOWSHEET NOTE
Rate flips back and forth between NSR/afib/aflutter, asymptomatic. After evening meds given, rate converted to NSR, but activity seems to increase rate. Will cont to boubacar.

## 2021-05-31 NOTE — PROGRESS NOTES
Progress Note    S/P cabgx4, SANDRO exclusion 5/28/21    Vital Signs:                                                 BP (!) 123/59   Pulse 86   Temp 98 °F (36.7 °C) (Temporal)   Resp 16   Ht 5' 9\" (1.753 m)   Wt 192 lb 0.3 oz (87.1 kg)   SpO2 97%   BMI 28.36 kg/m²  O2 Flow Rate (L/min): 1 L/min   SR  Admission Weight: 192 lb (87.1 kg)  preop 84.7    I/O:      Intake/Output Summary (Last 24 hours) at 5/31/2021 0739  Last data filed at 5/30/2021 2043  Gross per 24 hour   Intake 600 ml   Output 586 ml   Net 14 ml     CV: reg, wound c/d/i  Pulm: decreased  Abd: soft  Ext: warm, no edema    Data Review:  CBC:   Recent Labs     05/30/21  0333   WBC 8.6   HGB 8.4*   HCT 26.2*   MCV 73.9*        BMP:   Recent Labs     05/29/21  0450 05/30/21  0333 05/31/21  0553   NA  --  135*  --    K  --  4.1  --    CL  --  96*  --    CO2  --  28  --    BUN  --  23*  --    CREATININE  --  1.1  --    CALCIUM  --  8.8  --    MG 2.20 2.50* 2.40     Cardiac Enzymes: No results for input(s): CKTOTAL, CKMB, CKMBINDEX, TROPONINI in the last 72 hours. PT/INR: No results for input(s): PROTIME, INR in the last 72 hours. APTT: No results for input(s): APTT in the last 72 hours. Assessment/Plan:  CV - SR. AF 5/28. Amio.   - BB-increase, ACE   - statin  pulm - pulm toilet, wean O2  Renal - Cr nl. At admission weight. - urethral stricture. Flomax. Chapa removed 5/20,  further management per urology. ID - abx for preop UTI 5/24 completed  Heme - acute blood loss anemia.    -    - arixtra   - start coumadin for AF    Sol Burks MD  5/31/2021  7:39 AM

## 2021-06-01 ENCOUNTER — APPOINTMENT (OUTPATIENT)
Dept: GENERAL RADIOLOGY | Age: 61
DRG: 234 | End: 2021-06-01
Attending: INTERNAL MEDICINE
Payer: COMMERCIAL

## 2021-06-01 LAB
GLUCOSE BLD-MCNC: 116 MG/DL (ref 70–99)
GLUCOSE BLD-MCNC: 123 MG/DL (ref 70–99)
GLUCOSE BLD-MCNC: 126 MG/DL (ref 70–99)
GLUCOSE BLD-MCNC: 210 MG/DL (ref 70–99)
GLUCOSE BLD-MCNC: 91 MG/DL (ref 70–99)
INR BLD: 1.18 (ref 0.86–1.14)
MAGNESIUM: 2.5 MG/DL (ref 1.8–2.4)
PERFORMED ON: ABNORMAL
PERFORMED ON: NORMAL
PROTHROMBIN TIME: 13.7 SEC (ref 10–13.2)

## 2021-06-01 PROCEDURE — 2700000000 HC OXYGEN THERAPY PER DAY

## 2021-06-01 PROCEDURE — APPSS15 APP SPLIT SHARED TIME 0-15 MINUTES: Performed by: NURSE PRACTITIONER

## 2021-06-01 PROCEDURE — 6370000000 HC RX 637 (ALT 250 FOR IP): Performed by: NURSE PRACTITIONER

## 2021-06-01 PROCEDURE — 6370000000 HC RX 637 (ALT 250 FOR IP): Performed by: THORACIC SURGERY (CARDIOTHORACIC VASCULAR SURGERY)

## 2021-06-01 PROCEDURE — APPNB30 APP NON BILLABLE TIME 0-30 MINS: Performed by: NURSE PRACTITIONER

## 2021-06-01 PROCEDURE — 97116 GAIT TRAINING THERAPY: CPT

## 2021-06-01 PROCEDURE — 6360000002 HC RX W HCPCS: Performed by: THORACIC SURGERY (CARDIOTHORACIC VASCULAR SURGERY)

## 2021-06-01 PROCEDURE — 85610 PROTHROMBIN TIME: CPT

## 2021-06-01 PROCEDURE — 6370000000 HC RX 637 (ALT 250 FOR IP): Performed by: INTERNAL MEDICINE

## 2021-06-01 PROCEDURE — 2580000003 HC RX 258: Performed by: THORACIC SURGERY (CARDIOTHORACIC VASCULAR SURGERY)

## 2021-06-01 PROCEDURE — 71045 X-RAY EXAM CHEST 1 VIEW: CPT

## 2021-06-01 PROCEDURE — 83735 ASSAY OF MAGNESIUM: CPT

## 2021-06-01 PROCEDURE — 2000000000 HC ICU R&B

## 2021-06-01 PROCEDURE — 94761 N-INVAS EAR/PLS OXIMETRY MLT: CPT

## 2021-06-01 PROCEDURE — 94640 AIRWAY INHALATION TREATMENT: CPT

## 2021-06-01 PROCEDURE — 94669 MECHANICAL CHEST WALL OSCILL: CPT

## 2021-06-01 RX ORDER — AMIODARONE HYDROCHLORIDE 200 MG/1
TABLET ORAL
Qty: 28 TABLET | Refills: 0 | Status: CANCELLED | OUTPATIENT
Start: 2021-06-01 | End: 2021-06-22

## 2021-06-01 RX ORDER — WARFARIN SODIUM 2.5 MG/1
2.5 TABLET ORAL
Status: COMPLETED | OUTPATIENT
Start: 2021-06-01 | End: 2021-06-01

## 2021-06-01 RX ORDER — WARFARIN SODIUM 2.5 MG/1
TABLET ORAL
Qty: 30 TABLET | Refills: 3 | Status: CANCELLED | OUTPATIENT
Start: 2021-06-01

## 2021-06-01 RX ADMIN — METOPROLOL TARTRATE 37.5 MG: 25 TABLET, FILM COATED ORAL at 08:14

## 2021-06-01 RX ADMIN — INSULIN LISPRO 5 UNITS: 100 INJECTION, SOLUTION INTRAVENOUS; SUBCUTANEOUS at 16:47

## 2021-06-01 RX ADMIN — ALBUTEROL SULFATE 2.5 MG: 2.5 SOLUTION RESPIRATORY (INHALATION) at 21:26

## 2021-06-01 RX ADMIN — ACETAMINOPHEN 1000 MG: 500 TABLET ORAL at 18:37

## 2021-06-01 RX ADMIN — INSULIN LISPRO 5 UNITS: 100 INJECTION, SOLUTION INTRAVENOUS; SUBCUTANEOUS at 11:30

## 2021-06-01 RX ADMIN — METOPROLOL TARTRATE 37.5 MG: 25 TABLET, FILM COATED ORAL at 20:10

## 2021-06-01 RX ADMIN — DIPHENHYDRAMINE HYDROCHLORIDE 25 MG: 25 TABLET ORAL at 23:58

## 2021-06-01 RX ADMIN — INSULIN LISPRO 5 UNITS: 100 INJECTION, SOLUTION INTRAVENOUS; SUBCUTANEOUS at 08:18

## 2021-06-01 RX ADMIN — ALBUTEROL SULFATE 2.5 MG: 2.5 SOLUTION RESPIRATORY (INHALATION) at 11:28

## 2021-06-01 RX ADMIN — OXYCODONE 10 MG: 5 TABLET ORAL at 01:29

## 2021-06-01 RX ADMIN — STANDARDIZED SENNA CONCENTRATE AND DOCUSATE SODIUM 1 TABLET: 8.6; 5 TABLET ORAL at 08:17

## 2021-06-01 RX ADMIN — TRAMADOL HYDROCHLORIDE 50 MG: 50 TABLET, FILM COATED ORAL at 08:17

## 2021-06-01 RX ADMIN — ACETAMINOPHEN 1000 MG: 500 TABLET ORAL at 01:24

## 2021-06-01 RX ADMIN — HYDROCORTISONE: 1 CREAM TOPICAL at 08:17

## 2021-06-01 RX ADMIN — PANTOPRAZOLE SODIUM 40 MG: 40 TABLET, DELAYED RELEASE ORAL at 08:13

## 2021-06-01 RX ADMIN — ALBUTEROL SULFATE 2.5 MG: 2.5 SOLUTION RESPIRATORY (INHALATION) at 15:28

## 2021-06-01 RX ADMIN — INSULIN LISPRO 6 UNITS: 100 INJECTION, SOLUTION INTRAVENOUS; SUBCUTANEOUS at 11:30

## 2021-06-01 RX ADMIN — ALBUTEROL SULFATE 2.5 MG: 2.5 SOLUTION RESPIRATORY (INHALATION) at 08:29

## 2021-06-01 RX ADMIN — FONDAPARINUX SODIUM 2.5 MG: 2.5 INJECTION SUBCUTANEOUS at 08:14

## 2021-06-01 RX ADMIN — METFORMIN HYDROCHLORIDE 1000 MG: 500 TABLET ORAL at 16:48

## 2021-06-01 RX ADMIN — OXYCODONE 10 MG: 5 TABLET ORAL at 20:09

## 2021-06-01 RX ADMIN — Medication 2 PUFF: at 21:27

## 2021-06-01 RX ADMIN — Medication 10 ML: at 08:22

## 2021-06-01 RX ADMIN — Medication 10 ML: at 20:19

## 2021-06-01 RX ADMIN — TAMSULOSIN HYDROCHLORIDE 0.4 MG: 0.4 CAPSULE ORAL at 20:09

## 2021-06-01 RX ADMIN — ACETAMINOPHEN 1000 MG: 500 TABLET ORAL at 11:40

## 2021-06-01 RX ADMIN — WARFARIN SODIUM 2.5 MG: 2.5 TABLET ORAL at 16:48

## 2021-06-01 RX ADMIN — AMIODARONE HYDROCHLORIDE 400 MG: 200 TABLET ORAL at 08:13

## 2021-06-01 RX ADMIN — POTASSIUM CHLORIDE 10 MEQ: 750 TABLET, FILM COATED, EXTENDED RELEASE ORAL at 16:48

## 2021-06-01 RX ADMIN — INSULIN GLARGINE 13 UNITS: 100 INJECTION, SOLUTION SUBCUTANEOUS at 20:14

## 2021-06-01 RX ADMIN — AMIODARONE HYDROCHLORIDE 400 MG: 200 TABLET ORAL at 20:10

## 2021-06-01 RX ADMIN — ATORVASTATIN CALCIUM 40 MG: 40 TABLET, FILM COATED ORAL at 20:09

## 2021-06-01 RX ADMIN — FUROSEMIDE 20 MG: 10 INJECTION, SOLUTION INTRAMUSCULAR; INTRAVENOUS at 08:17

## 2021-06-01 RX ADMIN — Medication 2 PUFF: at 08:29

## 2021-06-01 RX ADMIN — ASPIRIN 325 MG: 325 TABLET, COATED ORAL at 08:13

## 2021-06-01 RX ADMIN — LISINOPRIL 2.5 MG: 5 TABLET ORAL at 11:40

## 2021-06-01 RX ADMIN — TRAMADOL HYDROCHLORIDE 50 MG: 50 TABLET, FILM COATED ORAL at 16:51

## 2021-06-01 ASSESSMENT — PAIN SCALES - GENERAL
PAINLEVEL_OUTOF10: 0
PAINLEVEL_OUTOF10: 7
PAINLEVEL_OUTOF10: 0
PAINLEVEL_OUTOF10: 6
PAINLEVEL_OUTOF10: 0
PAINLEVEL_OUTOF10: 0
PAINLEVEL_OUTOF10: 9
PAINLEVEL_OUTOF10: 0
PAINLEVEL_OUTOF10: 5
PAINLEVEL_OUTOF10: 0
PAINLEVEL_OUTOF10: 1
PAINLEVEL_OUTOF10: 9
PAINLEVEL_OUTOF10: 2
PAINLEVEL_OUTOF10: 6

## 2021-06-01 ASSESSMENT — PAIN DESCRIPTION - FREQUENCY: FREQUENCY: INTERMITTENT

## 2021-06-01 ASSESSMENT — PAIN DESCRIPTION - LOCATION: LOCATION: CHEST

## 2021-06-01 ASSESSMENT — PAIN DESCRIPTION - PROGRESSION: CLINICAL_PROGRESSION: GRADUALLY IMPROVING

## 2021-06-01 ASSESSMENT — PAIN DESCRIPTION - DESCRIPTORS: DESCRIPTORS: ACHING

## 2021-06-01 ASSESSMENT — PAIN DESCRIPTION - ORIENTATION: ORIENTATION: ANTERIOR

## 2021-06-01 ASSESSMENT — PAIN DESCRIPTION - PAIN TYPE: TYPE: SURGICAL PAIN

## 2021-06-01 NOTE — PROGRESS NOTES
and supp to remain greater than 75%       Nutrition Monitoring and Evaluation:   Behavioral-Environmental Outcomes:  None Identified   Food/Nutrient Intake Outcomes:  Food and Nutrient Intake, Supplement Intake  Physical Signs/Symptoms Outcomes:  Weight     Discharge Planning:    No discharge needs at this time     Electronically signed by Zulay Cheung RD, CNSC, LD on 6/1/21 at 10:17 AM EDT    Contact: [phone]

## 2021-06-01 NOTE — PROGRESS NOTES
Cardiothoracic Surgery Progress Note    CC: s/p CABG X4, SANDRO  POD# 7    Subjective:  Hemodynamically stable, RA, afebrile. Alert and oriented X 3. Weight same this am as yesterday. WT:87.1 kg/87.1 kg   pre-op 89.8 kg    Vital Signs:   Vitals:    06/01/21 1130   BP: (!) 114/46   Pulse: 85   Resp: 18   Temp: 97.3 °F (36.3 °C)   SpO2: 100%      Physical Exam:   Cardiac:  NSR  Lungs: clear to auscultation, decreased bases bilaterally  Abdomen:  + BM  Vascular:  pulses all palpable   Extremities: generalized, non-pitting edema 1+  :  /400/1025     Chest Tube(s) & Incision(s):    Sternum: stable, Edges approximated, no drainage  Chest tube site: Purse string intact   Left leg incision:  Edges well approximated, no drainage         Labs:   CBC:   Recent Labs     05/30/21  0333   WBC 8.6   HGB 8.4*   HCT 26.2*        BMP:   Recent Labs     05/31/21  0553 06/01/21  0540   K  --   --    CREATININE  --   --    CALCIUM  --   --    MG 2.40 2.50*     PT/INR:   Recent Labs     06/01/21  0540   PROTIME 13.7*   INR 1.18*       Assessment/Plan:  As per CC:     Plan:   -Urethral stricture- urology on board, arana dc'd over weekend and no issues voiding. Will need follow-up with urology as outpatient  -AAA 4.8 cm per CT (4.3 cm 2019). Will need follow-up with Dr. Salvador Yanes as outpatient. -DM2- SSI, prandial, and lantus insulin ordered. Restart home glucophage 1000 mg BID today.   -Atrial fibrillation - last run >24 hours ago, anticoagulated on coumadin, daily PT/INR, on amiodarone PO  -Aggressive IS  -Diuresis - strict I/O, fluid restriction   -Ambulate    Disp:  Plan for home with home care - plan to d/c tomorrow am      Electronically signed by CHARLOTTE Maier CNP on 6/1/2021 at 12:46 PM     Attending Supervising Eladia Funes  The patient met the criteria for indirect supervision.   I discussed the findings and plans with the nurse practitioner and agree as documented in her note Korin Murphy     Electronically signed by Philly Dalton MD on 6/1/21 at 1:54 PM EDT

## 2021-06-01 NOTE — PLAN OF CARE
appropriate level of care  6/1/2021 0256 by Jayden Pagan RN  Outcome: Ongoing  5/31/2021 1754 by Taqueria Correa RN  Outcome: Ongoing     Problem:  Activity Intolerance:  Goal: Able to perform prescribed physical activity  Description: Able to perform prescribed physical activity  6/1/2021 0256 by Jayden Pagan RN  Outcome: Ongoing  5/31/2021 1754 by Taqueria Correa RN  Outcome: Ongoing  Goal: Ability to tolerate increased activity will improve  Description: Ability to tolerate increased activity will improve  6/1/2021 0256 by Jayden Pagan RN  Outcome: Ongoing  5/31/2021 1754 by Taqueria Correa RN  Outcome: Ongoing     Problem: Anxiety:  Goal: Level of anxiety will decrease  Description: Level of anxiety will decrease  6/1/2021 0256 by Jayden Pagan RN  Outcome: Ongoing  5/31/2021 1754 by Taqueria Correa RN  Outcome: Ongoing     Problem: Cardiac Output - Decreased:  Goal: Cardiac output within specified parameters  Description: Cardiac output within specified parameters  6/1/2021 0256 by Jayden Pagan RN  Outcome: Ongoing  5/31/2021 1754 by Taqueria Correa RN  Outcome: Ongoing  Goal: Hemodynamic stability will improve  Description: Hemodynamic stability will improve  6/1/2021 0256 by Jayden Pagan RN  Outcome: Ongoing  5/31/2021 1754 by Taqueria Correa RN  Outcome: Ongoing     Problem: Fluid Volume - Imbalance:  Goal: Ability to achieve a balanced intake and output will improve  Description: Ability to achieve a balanced intake and output will improve  6/1/2021 0256 by Jayden Pagan RN  Outcome: Ongoing  5/31/2021 1754 by Taqueria Correa RN  Outcome: Ongoing  Goal: Chest tube drainage is within specified parameters  Description: Chest tube drainage is within specified parameters  6/1/2021 0256 by Jayden Pagan RN  Outcome: Ongoing  5/31/2021 1754 by Taqueria Correa RN  Outcome: Ongoing     Problem: Gas Exchange - Impaired:  Goal: Levels of oxygenation will improve  Description: Levels of oxygenation will improve  6/1/2021 0256 by Willy Brandt RN  Outcome: Ongoing  5/31/2021 1754 by Carlos Montenegro RN  Outcome: Ongoing  Goal: Ability to maintain adequate ventilation will improve  Description: Ability to maintain adequate ventilation will improve  6/1/2021 0256 by Willy Brandt RN  Outcome: Ongoing  5/31/2021 1754 by Carlos Montenegro RN  Outcome: Ongoing     Problem: Pain:  Description: Pain management should include both nonpharmacologic and pharmacologic interventions.   Goal: Pain level will decrease  Description: Pain level will decrease  6/1/2021 0256 by Willy Brandt RN  Outcome: Ongoing  5/31/2021 1754 by Carlos Montenegro RN  Outcome: Ongoing  Goal: Control of acute pain  Description: Control of acute pain  6/1/2021 0256 by Willy Brandt RN  Outcome: Ongoing  5/31/2021 1754 by Carlos Montenegro RN  Outcome: Ongoing  Goal: Control of chronic pain  Description: Control of chronic pain  6/1/2021 0256 by Willy Brandt RN  Outcome: Ongoing  5/31/2021 1754 by Carlos Montenegro RN  Outcome: Ongoing     Problem: Tissue Perfusion - Cardiopulmonary, Altered:  Goal: Absence of angina  Description: Absence of angina  6/1/2021 0256 by Willy Brandt RN  Outcome: Ongoing  5/31/2021 1754 by Carlos Montenegro RN  Outcome: Ongoing  Goal: Hemodynamic stability will improve  Description: Hemodynamic stability will improve  6/1/2021 0256 by Willy Brandt RN  Outcome: Ongoing  5/31/2021 1754 by Carlos Montenegro RN  Outcome: Ongoing  Goal: Will show no evidence of cardiac arrhythmias  Description: Will show no evidence of cardiac arrhythmias  6/1/2021 0256 by Willy Brandt RN  Outcome: Ongoing  5/31/2021 1754 by Carlos Montenegro RN  Outcome: Ongoing     Problem: Tobacco Use:  Goal: Will participate in inpatient tobacco-use cessation counseling  Description: Will participate in inpatient tobacco-use cessation counseling  6/1/2021 0256 by Willy Brandt RN  Outcome: Ongoing  5/31/2021 1754 by Carlos Montenegro RN  Outcome: Ongoing Problem: Nutrition  Goal: Optimal nutrition therapy  6/1/2021 0256 by Philipp Seth, RN  Outcome: Ongoing  5/31/2021 1754 by Kathrine Deleon RN  Outcome: Ongoing

## 2021-06-01 NOTE — PROGRESS NOTES
Assessment completed. See flowchart. Medication given. VSS. Dr Aquiles Olmos ok with DC pt home today or tomorrow but wife insisted that he stay one more day. Walked in the unit with minimal assist. Patient encouraged to use call light with any needs. Patient states understanding, call light in reach, bed alarm on. Will continue to monitor.

## 2021-06-01 NOTE — CARE COORDINATION
Pt is being followed by [facility]. Possible discharge tomorrow. Spoke again with pt about therapy's recommendation for a 4 wheel walker with seat d/t pt having to sit frequently when ambulating d/t fatigue. Pt reported the walker his spouse has at home has a seat and he will use that one. SW to follow for discharge orders.     Electronically signed by ALEX Langford, ALFONSO on 6/1/2021 at 4:45 PM

## 2021-06-01 NOTE — PLAN OF CARE
Problem: Falls - Risk of:  Goal: Will remain free from falls  Description: Will remain free from falls  6/1/2021 1055 by Yani Cox RN  Outcome: Ongoing     Problem: Pain:  Goal: Pain level will decrease  Description: Pain level will decrease  6/1/2021 1055 by Yani Cox RN  Outcome: Ongoing

## 2021-06-01 NOTE — PROGRESS NOTES
safety/sternal precautions. Patient will continue to benefit from additional skilled PT intervention to facilitate safe mobility and to optimize (I) to promote return to prior level of function. Treatment Diagnosis: impaired functional mobility  Prognosis: Good  Patient Education: Patient educated on role of PT, use of call light, sternal precautions, and PT recommendations - patient verbalizes understanding. Barriers to Learning: none  REQUIRES PT FOLLOW UP: Yes  Activity Tolerance  Activity Tolerance: Patient limited by endurance     Patient Diagnosis(es): There were no encounter diagnoses. has a past medical history of AAA (abdominal aortic aneurysm) ([address] Aneurysm of descending thoracic aorta ([address] Arthritis, Atherosclerosis of aorta ([address] Back pain, Diabetes mellitus ([address] Diverticulosis, Elevated PSA, Hyperlipidemia, Hypertension, and Polyneuropathy associated with underlying disease ([address] has a past surgical history that includes Upper gastrointestinal endoscopy (11/30/15); Urethra dilation (5/2017, 7/2017); Cardiac catheterization; Upper gastrointestinal endoscopy (N/A, 10/12/2018); Coronary artery bypass graft (N/A, 5/25/2021); and Cystoscopy (5/25/2021). Restrictions  Restrictions/Precautions  Restrictions/Precautions: Fall Risk (high fall risk, up in chair for meals, progressive ambulation, diet cardiac - carb control, 1800 ml daily fluid restriction, no caffine)  Position Activity Restriction  Sternal Precautions: No Pushing, No Pulling, 5# Lifting Restrictions  Other position/activity restrictions: CABG x4, SANDRO ligation 5/25/2021  Subjective   General  Chart Reviewed: Yes  Family / Caregiver Present: No  Subjective  Subjective: Patient reports sternal incision pain, does not rate. General Comment  Comments: Patient seated in recliner upon arrival - agreeable to PT. RN approval obtained prior to PT entry.           Orientation  Orientation  Overall Orientation Status: Within Functional Limits    Objective      Transfers  Sit to Stand: Contact guard assistance (to rollator with cardiac pillow/sternal precautions)  Stand to sit: Contact guard assistance (from rollator with cardiac pillow/sternal precautions)  Ambulation  Ambulation?: Yes  Ambulation 1  Surface: level tile  Device: Rollator  Assistance: Contact guard assistance  Quality of Gait: steady reji, no loss of balance, intermittent cues for upright posture  Distance: 325ft  Comments: denies increased pain with gait trial this date  Stairs/Curb  Stairs?:  (declines trial, reports no stairs in home)     Balance  Posture: Fair (forward flexed, rounded shoulders)  Sitting - Static: Good  Sitting - Dynamic: Good  Standing - Static: Good;-  Standing - Dynamic: Fair;+  Comments: SBA static standing balance with rollator        AM-PAC Score  AM-PAC Inpatient Mobility Raw Score : 17 (06/01/21 1300)  AM-PAC Inpatient T-Scale Score : 42.13 (06/01/21 1300)  Mobility Inpatient CMS 0-100% Score: 50.57 (06/01/21 1300)  Mobility Inpatient CMS G-Code Modifier : CK (06/01/21 1300)          Goals  Short term goals  Time Frame for Short term goals:  To be met prior to Dc - all goals ongoing 6/1  Short term goal 1: Pt will perform bed mobility with min A  Short term goal 2: Pt will perform sit to/from stand with supervision  Short term goal 3: Pt will ambulate 300' with 4WW and supervision  Short term goal 4: Pt will ascend/descend 3 stairs with AAD and CGA  Patient Goals   Patient goals : 'to go home with my wife\"    Plan    Plan  Times per week: 3-5  Current Treatment Recommendations: Strengthening, Balance Training, Functional Mobility Training, Transfer Training, Endurance Training, Stair training, Gait Training, Neuromuscular Re-education, Safety Education & Training, Home Exercise Program, Patient/Caregiver Education & Training, Equipment Evaluation, Education, & procurement  Safety Devices  Type of devices: Call light within reach, Chair alarm in place,

## 2021-06-01 NOTE — PLAN OF CARE
Nutrition Problem #1: Increased nutrient needs  Intervention: Food and/or Nutrient Delivery: Continue Current Diet, Continue Oral Nutrition Supplement  Nutritional Goals: New goal po at meals and supp to remain greater than 75%

## 2021-06-01 NOTE — PROGRESS NOTES
Revisited shower with patient; he stated that he wishes to wait until his wife is here to bathe him.

## 2021-06-02 ENCOUNTER — TELEPHONE (OUTPATIENT)
Dept: CARDIOTHORACIC SURGERY | Age: 61
End: 2021-06-02

## 2021-06-02 VITALS
OXYGEN SATURATION: 94 % | BODY MASS INDEX: 28.34 KG/M2 | RESPIRATION RATE: 17 BRPM | HEIGHT: 69 IN | TEMPERATURE: 97.5 F | SYSTOLIC BLOOD PRESSURE: 141 MMHG | HEART RATE: 89 BPM | DIASTOLIC BLOOD PRESSURE: 56 MMHG | WEIGHT: 191.36 LBS

## 2021-06-02 DIAGNOSIS — I71.40 ABDOMINAL AORTIC ANEURYSM (AAA) WITHOUT RUPTURE: ICD-10-CM

## 2021-06-02 DIAGNOSIS — Z86.79 ATRIAL FIBRILLATION, CURRENTLY IN SINUS RHYTHM: Primary | ICD-10-CM

## 2021-06-02 DIAGNOSIS — Z95.1 S/P CABG X 4: ICD-10-CM

## 2021-06-02 LAB
ANION GAP SERPL CALCULATED.3IONS-SCNC: 11 MMOL/L (ref 3–16)
BUN BLDV-MCNC: 28 MG/DL (ref 7–20)
CALCIUM SERPL-MCNC: 8.8 MG/DL (ref 8.3–10.6)
CHLORIDE BLD-SCNC: 96 MMOL/L (ref 99–110)
CO2: 29 MMOL/L (ref 21–32)
CREAT SERPL-MCNC: 1.1 MG/DL (ref 0.8–1.3)
GFR AFRICAN AMERICAN: >60
GFR NON-AFRICAN AMERICAN: >60
GLUCOSE BLD-MCNC: 102 MG/DL (ref 70–99)
GLUCOSE BLD-MCNC: 96 MG/DL (ref 70–99)
HCT VFR BLD CALC: 27.5 % (ref 40.5–52.5)
HEMOGLOBIN: 8.7 G/DL (ref 13.5–17.5)
INR BLD: 1.22 (ref 0.86–1.14)
MAGNESIUM: 2.2 MG/DL (ref 1.8–2.4)
MCH RBC QN AUTO: 23.7 PG (ref 26–34)
MCHC RBC AUTO-ENTMCNC: 31.8 G/DL (ref 31–36)
MCV RBC AUTO: 74.6 FL (ref 80–100)
PDW BLD-RTO: 19.4 % (ref 12.4–15.4)
PERFORMED ON: NORMAL
PLATELET # BLD: 293 K/UL (ref 135–450)
PMV BLD AUTO: 8.1 FL (ref 5–10.5)
POTASSIUM SERPL-SCNC: 4 MMOL/L (ref 3.5–5.1)
PROTHROMBIN TIME: 14.2 SEC (ref 10–13.2)
RBC # BLD: 3.68 M/UL (ref 4.2–5.9)
SODIUM BLD-SCNC: 136 MMOL/L (ref 136–145)
WBC # BLD: 10.2 K/UL (ref 4–11)

## 2021-06-02 PROCEDURE — APPNB30 APP NON BILLABLE TIME 0-30 MINS: Performed by: NURSE PRACTITIONER

## 2021-06-02 PROCEDURE — 6360000002 HC RX W HCPCS: Performed by: THORACIC SURGERY (CARDIOTHORACIC VASCULAR SURGERY)

## 2021-06-02 PROCEDURE — 6370000000 HC RX 637 (ALT 250 FOR IP): Performed by: INTERNAL MEDICINE

## 2021-06-02 PROCEDURE — 6370000000 HC RX 637 (ALT 250 FOR IP): Performed by: THORACIC SURGERY (CARDIOTHORACIC VASCULAR SURGERY)

## 2021-06-02 PROCEDURE — 80048 BASIC METABOLIC PNL TOTAL CA: CPT

## 2021-06-02 PROCEDURE — 85027 COMPLETE CBC AUTOMATED: CPT

## 2021-06-02 PROCEDURE — APPSS15 APP SPLIT SHARED TIME 0-15 MINUTES: Performed by: NURSE PRACTITIONER

## 2021-06-02 PROCEDURE — 2580000003 HC RX 258: Performed by: THORACIC SURGERY (CARDIOTHORACIC VASCULAR SURGERY)

## 2021-06-02 PROCEDURE — 94761 N-INVAS EAR/PLS OXIMETRY MLT: CPT

## 2021-06-02 PROCEDURE — 85610 PROTHROMBIN TIME: CPT

## 2021-06-02 PROCEDURE — 94640 AIRWAY INHALATION TREATMENT: CPT

## 2021-06-02 PROCEDURE — 83735 ASSAY OF MAGNESIUM: CPT

## 2021-06-02 PROCEDURE — 6370000000 HC RX 637 (ALT 250 FOR IP): Performed by: NURSE PRACTITIONER

## 2021-06-02 PROCEDURE — 94669 MECHANICAL CHEST WALL OSCILL: CPT

## 2021-06-02 RX ORDER — SENNA AND DOCUSATE SODIUM 50; 8.6 MG/1; MG/1
1 TABLET, FILM COATED ORAL 2 TIMES DAILY
Qty: 60 TABLET | Refills: 0 | Status: SHIPPED | OUTPATIENT
Start: 2021-06-02 | End: 2021-07-02

## 2021-06-02 RX ORDER — PANTOPRAZOLE SODIUM 40 MG/1
40 TABLET, DELAYED RELEASE ORAL DAILY
Qty: 30 TABLET | Refills: 0 | Status: SHIPPED | OUTPATIENT
Start: 2021-06-02 | End: 2021-08-31 | Stop reason: ALTCHOICE

## 2021-06-02 RX ORDER — WARFARIN SODIUM 2.5 MG/1
2.5 TABLET ORAL DAILY
Qty: 30 TABLET | Refills: 3 | Status: SHIPPED | OUTPATIENT
Start: 2021-06-02 | End: 2022-03-14 | Stop reason: ALTCHOICE

## 2021-06-02 RX ORDER — WARFARIN SODIUM 2.5 MG/1
TABLET ORAL
Status: DISCONTINUED
Start: 2021-06-02 | End: 2021-06-02 | Stop reason: HOSPADM

## 2021-06-02 RX ORDER — ACETAMINOPHEN 500 MG
1000 TABLET ORAL EVERY 6 HOURS
Qty: 120 TABLET | Refills: 3 | Status: SHIPPED | OUTPATIENT
Start: 2021-06-02

## 2021-06-02 RX ORDER — LISINOPRIL 2.5 MG/1
2.5 TABLET ORAL
Qty: 30 TABLET | Refills: 3 | Status: SHIPPED
Start: 2021-06-02 | End: 2021-06-07 | Stop reason: SINTOL

## 2021-06-02 RX ORDER — METOPROLOL TARTRATE 37.5 MG/1
37.5 TABLET, FILM COATED ORAL 2 TIMES DAILY
Qty: 60 TABLET | Refills: 3 | Status: SHIPPED | OUTPATIENT
Start: 2021-06-02 | End: 2021-08-31

## 2021-06-02 RX ORDER — AMIODARONE HYDROCHLORIDE 200 MG/1
200 TABLET ORAL DAILY
Qty: 21 TABLET | Refills: 0 | Status: SHIPPED | OUTPATIENT
Start: 2021-06-03 | End: 2021-07-28 | Stop reason: ALTCHOICE

## 2021-06-02 RX ORDER — WARFARIN SODIUM 2.5 MG/1
2.5 TABLET ORAL DAILY
Qty: 30 TABLET | Refills: 3 | Status: SHIPPED | OUTPATIENT
Start: 2021-06-02 | End: 2021-06-02 | Stop reason: HOSPADM

## 2021-06-02 RX ORDER — OXYCODONE HYDROCHLORIDE 5 MG/1
5 TABLET ORAL EVERY 4 HOURS PRN
Qty: 42 TABLET | Refills: 0 | Status: SHIPPED | OUTPATIENT
Start: 2021-06-02 | End: 2021-06-09

## 2021-06-02 RX ORDER — CLOPIDOGREL BISULFATE 75 MG/1
75 TABLET ORAL DAILY
Qty: 30 TABLET | Refills: 3 | Status: SHIPPED
Start: 2021-06-02 | End: 2021-06-02 | Stop reason: HOSPADM

## 2021-06-02 RX ADMIN — Medication 2 PUFF: at 09:05

## 2021-06-02 RX ADMIN — Medication 10 ML: at 08:48

## 2021-06-02 RX ADMIN — ASPIRIN 325 MG: 325 TABLET, COATED ORAL at 08:44

## 2021-06-02 RX ADMIN — FUROSEMIDE 20 MG: 10 INJECTION, SOLUTION INTRAMUSCULAR; INTRAVENOUS at 08:44

## 2021-06-02 RX ADMIN — PANTOPRAZOLE SODIUM 40 MG: 40 TABLET, DELAYED RELEASE ORAL at 08:45

## 2021-06-02 RX ADMIN — ALBUTEROL SULFATE 2.5 MG: 2.5 SOLUTION RESPIRATORY (INHALATION) at 09:05

## 2021-06-02 RX ADMIN — METFORMIN HYDROCHLORIDE 1000 MG: 500 TABLET ORAL at 08:44

## 2021-06-02 RX ADMIN — FONDAPARINUX SODIUM 2.5 MG: 2.5 INJECTION SUBCUTANEOUS at 08:44

## 2021-06-02 RX ADMIN — ACETAMINOPHEN 1000 MG: 500 TABLET ORAL at 08:44

## 2021-06-02 RX ADMIN — Medication 400 MG: at 08:44

## 2021-06-02 RX ADMIN — TRAMADOL HYDROCHLORIDE 50 MG: 50 TABLET, FILM COATED ORAL at 10:39

## 2021-06-02 RX ADMIN — METOPROLOL TARTRATE 37.5 MG: 25 TABLET, FILM COATED ORAL at 08:45

## 2021-06-02 RX ADMIN — POTASSIUM CHLORIDE 10 MEQ: 750 TABLET, FILM COATED, EXTENDED RELEASE ORAL at 08:44

## 2021-06-02 RX ADMIN — STANDARDIZED SENNA CONCENTRATE AND DOCUSATE SODIUM 1 TABLET: 8.6; 5 TABLET ORAL at 08:44

## 2021-06-02 RX ADMIN — AMIODARONE HYDROCHLORIDE 200 MG: 200 TABLET ORAL at 08:45

## 2021-06-02 ASSESSMENT — PAIN SCALES - GENERAL
PAINLEVEL_OUTOF10: 0
PAINLEVEL_OUTOF10: 6
PAINLEVEL_OUTOF10: 0
PAINLEVEL_OUTOF10: 0

## 2021-06-02 NOTE — PLAN OF CARE
Problem: Falls - Risk of:  Goal: Will remain free from falls  Outcome: Met This Shift     Problem: Bleeding:  Goal: Will show no signs and symptoms of excessive bleeding  Outcome: Met This Shift     Problem: Pain:  Goal: Pain level will decrease  Outcome: Met This Shift  Goal: Control of acute pain  Outcome: Met This Shift     Problem: Activity Intolerance:  Goal: Able to perform prescribed physical activity  Outcome: Met This Shift  Goal: Ability to tolerate increased activity will improve  Outcome: Met This Shift     Problem: Anxiety:  Goal: Level of anxiety will decrease  Outcome: Not Met This Shift  Note: Pt very nervous about going home as wakes suddenly unable to breathe.       Problem: Cardiac Output - Decreased:  Goal: Cardiac output within specified parameters  Outcome: Completed  Goal: Hemodynamic stability will improve  Outcome: Met This Shift     Problem: Fluid Volume - Imbalance:  Goal: Ability to achieve a balanced intake and output will improve  Outcome: Met This Shift  Goal: Chest tube drainage is within specified parameters  Outcome: Completed     Problem: Pain:  Goal: Pain level will decrease  Outcome: Met This Shift

## 2021-06-02 NOTE — DISCHARGE INSTR - DIET

## 2021-06-02 NOTE — TELEPHONE ENCOUNTER
Patient's son called and verified that patient should not be taking plavix. Also confirmed with pharmacy to d/c prescription.      Electronically signed by CHARLOTTE Garcia CNP on 6/2/21 at 1:57 PM EDT

## 2021-06-02 NOTE — PROGRESS NOTES
Cardiothoracic Surgery Progress Note    CC: s/p CABG X4, SANDRO  POD# 8    Subjective:  Hemodynamically stable, RA, afebrile. Alert and oriented X 3. Weight below pre-op weight. WT:86.8 kg/87.1 kg   pre-op 87.1 kg    Vital Signs:   Vitals:    06/02/21 0800   BP: (!) 141/56   Pulse: 89   Resp: 17   Temp: 97.8   SpO2: 92%      Physical Exam:   Cardiac:  NSR  Lungs: clear to auscultation, decreased bases bilaterally  Abdomen:  + BM  Vascular:  pulses all palpable   Extremities: no edema   :  UOP voiding      Chest Tube(s) & Incision(s):    Sternum: stable, Edges approximated, no drainage  Chest tube site: Purse string intact   Left leg incision:  Edges well approximated, no drainage         Labs:   CBC:   Recent Labs     06/02/21  0335   WBC 10.2   HGB 8.7*   HCT 27.5*        BMP:   Recent Labs     05/31/21  0553 06/01/21  0540   K  --   --    CREATININE  --   --    CALCIUM  --   --    MG 2.40 2.50*     PT/INR:   Recent Labs     06/01/21  0540 06/02/21  0335   PROTIME 13.7* 14.2*   INR 1.18* 1.22*       Assessment/Plan:  As per CC:     Plan:   D/C home today. Follow up with Dr. Aquiles Olmos next week. Will need CXR prior to visit (order in). -Urethral stricture- urology on board, arana dc'd over weekend and no issues voiding. Will need follow-up with urology as outpatient  -AAA 4.8 cm per CT (4.3 cm 2019). Will need follow-up with Dr. Ciera Escalona as outpatient.   -Atrial fibrillation - last run >24 hours ago, anticoagulated on coumadin, daily PT/INR, on amiodarone PO  -Continue aggressive IS    Disp:  Plan for home with home care       Electronically signed by CHARLOTTE Pino - CNP on 6/2/2021 at 8:43 AM       Attending Supervising Eladia Funes  The patient met the criteria for indirect supervision. I discussed the findings and plans with the nurse practitioner and agree as documented in her note .     Electronically signed by Clement Keene MD on 6/2/21 at 12:09 PM EDT

## 2021-06-02 NOTE — CARE COORDINATION
Patient discharged 6/2/2021 to home with [address]  PHONE; [phone]: [phone]. Order/ AVS faxed and agency notifed. No otherCM needs at this time.     All discharge needs met per case management    CDAEN Mayorga, CCM, RN  [facility]  [phone]

## 2021-06-02 NOTE — PROGRESS NOTES
Discharge instructions reviewed with patient and family member. Patient and family verbalized understanding. All home medications have been reviewed, questions answered and patient voiced understanding. All medication side effects reviewed and patient and family verbalized understanding. Follow up appointment(s) reviewed with patient and all attempts made to schedule within 7-10 days of discharge. Patient given instructions for  prescriptions, discharge instructions, and appointment times. Patient discharged to home with family via private car. Taken to lobby via wheelchair.

## 2021-06-02 NOTE — FLOWSHEET NOTE
Pt calling to say he cannot breathe when he lays down. He transiently falls asleep but quickly wakens gasping for air. Requesting oxygen to be placed on \"so I can get just a few winks of sleep\" despite spo2 >95%%. Afraid to go home when he cannot breathe. Enc C/DB today as well as ambulation. LLL remains grossly diminished; RLL w/ fine rales.   No cough  No fever  I/S to 1000

## 2021-06-03 ENCOUNTER — ANTI-COAG VISIT (OUTPATIENT)
Dept: CARDIOTHORACIC SURGERY | Age: 61
End: 2021-06-03

## 2021-06-03 ENCOUNTER — TELEPHONE (OUTPATIENT)
Dept: PHARMACY | Age: 61
End: 2021-06-03

## 2021-06-03 DIAGNOSIS — I71.40 ABDOMINAL AORTIC ANEURYSM (AAA) WITHOUT RUPTURE: ICD-10-CM

## 2021-06-03 DIAGNOSIS — I71.40 ABDOMINAL AORTIC ANEURYSM (AAA) WITHOUT RUPTURE: Primary | ICD-10-CM

## 2021-06-03 DIAGNOSIS — I48.19 PERSISTENT ATRIAL FIBRILLATION (HCC): Primary | ICD-10-CM

## 2021-06-03 DIAGNOSIS — Z95.1 S/P CORONARY ARTERY BYPASS GRAFT X 4: ICD-10-CM

## 2021-06-03 DIAGNOSIS — I48.19 PERSISTENT ATRIAL FIBRILLATION (HCC): ICD-10-CM

## 2021-06-03 LAB — INR BLD: 1.5

## 2021-06-03 RX ORDER — WARFARIN SODIUM 1 MG/1
3 TABLET ORAL DAILY
Qty: 60 TABLET | Refills: 1 | OUTPATIENT
Start: 2021-06-03 | End: 2022-03-14 | Stop reason: ALTCHOICE

## 2021-06-03 NOTE — TELEPHONE ENCOUNTER
Giovanna Reis w/ Dr Hwak Gay office called , asking if we could fax referral to Dr Robert Chang at [facility]. Giovanna Reis will manage warfarin until pt is established w/ [facility].

## 2021-06-03 NOTE — PROGRESS NOTES
Dosing per dr collins. INR reported via phone per Home care, Deb Mckeon, ph: [address] advised of dosing regimen. Verb understanding. Spoke w/pt and son - denies bleeding/bruising; no dizziness; no changes to diet or con comitant medications. Reviewed warfarin dosing at length with pt and son. add'l 1 mg warfarin script phoned to preferred pharmacy. Recheck INR fri 4june and will advise pt/son tomorrow w/add'l dosing instructions. Verb understanding of all.

## 2021-06-04 ENCOUNTER — ANTI-COAG VISIT (OUTPATIENT)
Dept: CARDIOTHORACIC SURGERY | Age: 61
End: 2021-06-04

## 2021-06-04 LAB — INR BLD: 1.4

## 2021-06-04 NOTE — CARE COORDINATION
SW received a call from pt's home therapist from [address] [facility] who stated pt did NOT have a walker at home as previously mentioned. Therapist asked for SW to call and inform which provider we made the request to for the walker while in the hospital.  SW contacted pt and informed it was through [facility] and provided their number, [phone]. Pt stated he will call.     Electronically signed by ALEX Vallejo, ALFONSO on 6/4/2021 at 3:21 PM

## 2021-06-04 NOTE — PROGRESS NOTES
Randee Sorto with [facility] called with INR result of 1.4. Verified dosing and compliance. Reviewed with Dr. Agus Hoffmann and he instructed for patient to continue warfarin 3 mg daily with next INR check on Monday 6/7/21. She also noted that patient was complaining of a tickling cough, worse at night. Lungs are clear and weight is stable. Patient has been holding Lisinopril today due to possible side effect. BP was 112/60. Per Dr. Agus Hoffmann, patient is to continue to hold Lisinopril and monitor BP. Will follow up on Monday. Orders were read back and verified.

## 2021-06-07 ENCOUNTER — ANTI-COAG VISIT (OUTPATIENT)
Dept: CARDIOTHORACIC SURGERY | Age: 61
End: 2021-06-07

## 2021-06-07 ENCOUNTER — TELEPHONE (OUTPATIENT)
Dept: CARDIOTHORACIC SURGERY | Age: 61
End: 2021-06-07

## 2021-06-07 DIAGNOSIS — Z95.1 S/P CORONARY ARTERY BYPASS GRAFT X 4: ICD-10-CM

## 2021-06-07 DIAGNOSIS — I71.40 ABDOMINAL AORTIC ANEURYSM (AAA) WITHOUT RUPTURE: Primary | ICD-10-CM

## 2021-06-07 DIAGNOSIS — I48.19 PERSISTENT ATRIAL FIBRILLATION (HCC): ICD-10-CM

## 2021-06-07 LAB — INR BLD: 2.1

## 2021-06-08 NOTE — TELEPHONE ENCOUNTER
S/w [facility], shes going to talk to pt about scheduling initial appt w/ CC at next [place] [address] visit ( Thursday). Explained that we can manage warfarin from [place] [address] if he can come in for initial appt.

## 2021-06-09 ENCOUNTER — ANTI-COAG VISIT (OUTPATIENT)
Dept: CARDIOTHORACIC SURGERY | Age: 61
End: 2021-06-09

## 2021-06-09 ENCOUNTER — HOSPITAL ENCOUNTER (OUTPATIENT)
Age: 61
Discharge: HOME | End: 2021-06-09
Payer: COMMERCIAL

## 2021-06-09 ENCOUNTER — HOSPITAL ENCOUNTER (OUTPATIENT)
Dept: GENERAL RADIOLOGY | Age: 61
Discharge: HOME | End: 2021-06-09
Payer: COMMERCIAL

## 2021-06-09 ENCOUNTER — TELEPHONE (OUTPATIENT)
Dept: CARDIOTHORACIC SURGERY | Age: 61
End: 2021-06-09

## 2021-06-09 ENCOUNTER — OFFICE VISIT (OUTPATIENT)
Dept: CARDIOTHORACIC SURGERY | Age: 61
End: 2021-06-09

## 2021-06-09 VITALS
RESPIRATION RATE: 16 BRPM | OXYGEN SATURATION: 98 % | WEIGHT: 194.1 LBS | HEART RATE: 76 BPM | BODY MASS INDEX: 28.75 KG/M2 | SYSTOLIC BLOOD PRESSURE: 124 MMHG | DIASTOLIC BLOOD PRESSURE: 82 MMHG | HEIGHT: 69 IN

## 2021-06-09 DIAGNOSIS — I71.40 ABDOMINAL AORTIC ANEURYSM (AAA) WITHOUT RUPTURE: ICD-10-CM

## 2021-06-09 DIAGNOSIS — Z95.1 S/P CORONARY ARTERY BYPASS GRAFT X 4: ICD-10-CM

## 2021-06-09 DIAGNOSIS — I48.19 PERSISTENT ATRIAL FIBRILLATION (HCC): ICD-10-CM

## 2021-06-09 DIAGNOSIS — Z95.1 S/P CABG X 4: ICD-10-CM

## 2021-06-09 DIAGNOSIS — I71.40 ABDOMINAL AORTIC ANEURYSM (AAA) WITHOUT RUPTURE: Primary | ICD-10-CM

## 2021-06-09 DIAGNOSIS — J18.1 CONSOLIDATION OF LEFT LOWER LOBE OF LUNG (HCC): Primary | ICD-10-CM

## 2021-06-09 DIAGNOSIS — Z86.79 ATRIAL FIBRILLATION, CURRENTLY IN SINUS RHYTHM: ICD-10-CM

## 2021-06-09 DIAGNOSIS — R93.89 ABNORMAL FINDING ON CHEST XRAY: ICD-10-CM

## 2021-06-09 LAB — INR BLD: 2.6

## 2021-06-09 PROCEDURE — 71046 X-RAY EXAM CHEST 2 VIEWS: CPT

## 2021-06-09 PROCEDURE — 99024 POSTOP FOLLOW-UP VISIT: CPT | Performed by: THORACIC SURGERY (CARDIOTHORACIC VASCULAR SURGERY)

## 2021-06-09 NOTE — TELEPHONE ENCOUNTER
OV w/ Dr Lico Matamoros today. Kely Campos left v/m stating MD will manage warfarin until pt gets scheduled w/ [facility]. [facility] will check INR on 6/11.

## 2021-06-09 NOTE — PROGRESS NOTES
INR ck completed at OV. Dosing reviewed with patient and family; verb understanding. Recheck INR per Home care Friday June 11. Left detailed message w/[place], ph: [phone]    Will schedule add'l follow up at [facility] when home care discharges patient.

## 2021-06-09 NOTE — PROGRESS NOTES
consolidation with pleural effusion in the left lung base that persists on today's x-ray. I recommended an ultrasound-guided thoracentesis at some point next week should his symptoms persist.  In the meantime he is on anticoagulation with Coumadin for paroxysmal A. fib that he had during the hospital stay and I will keep his INR closer to 2 in case he undergoes an invasive procedure next week. I explained to him that most likely he will  have to hold his Coumadin for a few days prior to his procedure. I also stressed the importance of him continuing the incentive spirometer and Acapella while at home for at least the next couple weeks to further improve left lower lobe re expansion. The patient may increase activities as he feels comfortable doing so. I stressed to him to follow religiously the sternal precautions for the next 6 weeks. Follow-up with Luis Lang as prescribed. Follow-up: With me in 1 month.     Linn Samaniego MD  6/9/2021  7:07 PM

## 2021-06-10 ENCOUNTER — TELEPHONE (OUTPATIENT)
Dept: CARDIOTHORACIC SURGERY | Age: 61
End: 2021-06-10

## 2021-06-10 NOTE — TELEPHONE ENCOUNTER
Spoke w/son at length; aware of US Thoracentesis, Thurs June 17; arrive 9:30 am; procedure at 10:30 am. Per Amanda Redding in interventional radiology - stop coumadin and asa 5 days prior, none starting 12June. States took 3 mg warfarin Weds 9June- will hold coumadin tonite, Thurs 10June. Denies bleeding /bruising. Advised may eat lite breakfast day of procedure. Verb understanding of all. [facility] aware of all above.

## 2021-06-11 ENCOUNTER — ANTI-COAG VISIT (OUTPATIENT)
Dept: CARDIOTHORACIC SURGERY | Age: 61
End: 2021-06-11

## 2021-06-11 ENCOUNTER — TELEPHONE (OUTPATIENT)
Dept: CARDIOLOGY CLINIC | Age: 61
End: 2021-06-11

## 2021-06-11 ENCOUNTER — PRE-PROCEDURE TELEPHONE (OUTPATIENT)
Dept: INTERVENTIONAL RADIOLOGY/VASCULAR | Age: 61
End: 2021-06-11

## 2021-06-11 LAB — INR BLD: 2.2

## 2021-06-11 NOTE — PROGRESS NOTES
Liza Santoro RN with [facility] called with INR result of 2.2. I reviewed with Dr. Nadira Bee and he instructed for patient to take warfarin 2 mg tonight and hold warfarin Sat/Sun. Next INR due Monday 6/14/21 to check that INR is coming down to below 2 in preparation for his Thoracentesis on 6/17/21. Orders were read back and verified.

## 2021-06-11 NOTE — TELEPHONE ENCOUNTER
HARSHA. Saida Payment Sueellen Payment This is this patients  for [facility].  She wanted to give her info to [facility] for any coordination of care

## 2021-06-14 ENCOUNTER — TELEPHONE (OUTPATIENT)
Dept: CARDIOTHORACIC SURGERY | Age: 61
End: 2021-06-14

## 2021-06-14 ENCOUNTER — TELEPHONE (OUTPATIENT)
Dept: PHARMACY | Age: 61
End: 2021-06-14

## 2021-06-14 ENCOUNTER — ANTI-COAG VISIT (OUTPATIENT)
Dept: PHARMACY | Age: 61
End: 2021-06-14
Payer: COMMERCIAL

## 2021-06-14 ENCOUNTER — ANTI-COAG VISIT (OUTPATIENT)
Dept: CARDIOTHORACIC SURGERY | Age: 61
End: 2021-06-14

## 2021-06-14 DIAGNOSIS — I48.19 PERSISTENT ATRIAL FIBRILLATION (HCC): ICD-10-CM

## 2021-06-14 DIAGNOSIS — Z95.1 S/P CORONARY ARTERY BYPASS GRAFT X 4: ICD-10-CM

## 2021-06-14 DIAGNOSIS — I48.91 ATRIAL FIBRILLATION, UNSPECIFIED TYPE (HCC): Primary | ICD-10-CM

## 2021-06-14 DIAGNOSIS — I71.40 ABDOMINAL AORTIC ANEURYSM (AAA) WITHOUT RUPTURE: Primary | ICD-10-CM

## 2021-06-14 LAB — INTERNATIONAL NORMALIZATION RATIO, POC: 1.5

## 2021-06-14 PROCEDURE — 99213 OFFICE O/P EST LOW 20 MIN: CPT

## 2021-06-14 PROCEDURE — 85610 PROTHROMBIN TIME: CPT

## 2021-06-14 NOTE — PROGRESS NOTES
Mr. Medhat Coburn is a 59 y.o. y/o male with history of Afib who presents today for anticoagulation monitoring and adjustment. Pertinent PMH: 59 y. o. male with significant past medical history of DM2, diverticulosis, HTN, HLD, AAA (4.3cm 9/2019, follows with Dr. Shara Herbert), elevated PSA (urethral dilation, on flomax)  who presents for an outpatient left heart angiogram on 5/21 with chest pain. Patient had his annual PCP visit where he was found to be hypertensive and complained of sharp chest and bilateral arm pain. PCP referred him to cardiology for further work-up.  Angiogram found  severe multivessel coronary artery disease. CVTS was consulted for surgical evaluation for myocardial revascularization. Patient is a former smoker. Pt states he has been on warfarin since his surgery (developed afib) 5/25. Was being monitored by Dr. Donnie Clay until now. Of note pt was also started on Amiodarone after procedure- looks like instructions are to take for 21 days, will have son address this question prior to next apt. Patient Reported Findings:  Yes     No  [x]   []       Patient verifies current dosing regimen as listed- Son does medications, states they have been doing 3mg daily since beginning of June.  Looked at [facility] dosing from Dr. Donnie Clay and she had pt taking 3mg daily from 6/3-6/8 then 2mg 6/9-6/11, and then had pt hold Sat 6/12 and Sun 6/13 for upcoming procedure   []   [x]       S/S bleeding/bruising/swelling/SOB- denies   []   [x]       Blood in urine or stool- denies   []   [x]       Procedures scheduled in the future at this time- thoracentesis on 6/17, holding since 6/12  []   [x]       Missed Dose- held last 2 days  []   [x]       Extra Dose  []   [x]       Change in medications- son does medications, will have son look at list for next time and let know if there are changes, can see he is on amiodarone   []   [x]       Change in health/diet/appetite- doesn't like to eat them often   []   [x] Change in alcohol use- doesn't drink or smoke   []   [x]       Change in activity  []   [x]       Hospital admission  []   [x]       Emergency department visit  []   [x]       Other complaints    Clinical Outcomes:  Yes     No  []   [x]       Major bleeding event  []   [x]       Thromboembolic event    Duration of warfarin Therapy: indefinite   INR Range:  2.0-3.0     Educated patient of signs and symptoms of bleeding and clotting, when to seek emergent care, the need to maintain a consistent diet and notification of clinic for any new medications including over the counter medications or abnormal bleeding. Patient acknowledges working in consult agreement with pharmacist as referred by his/her physician. Son does patient's medications- pt doesn't know anything about warfarin. Pt states son cannot attend apts dt working. Pt states still has some [place] visits remaining but has office visit on Monday 6/21 with cardio so will coordinate since pt is already going to be here. INR today is 1.5  Patient has been holding for procedure on 6/17. Continue to hold until 6/17, resume warfarin on 6/17 (unless otherwise directed). Son does medications- pt doesn't know what dose he has been on, what tablets he has, or what time of day he takes warfarin, he said a combination of 2mg and 3mg (using 1mg tablets). Asked if son would attend apts with him but he has to work. Pt has HH. Hold until 6/17. Resume warfarin on 6/17 and take 3mg daily until your appointment on Monday 6/21. Encouraged to maintain a consistency of vegetables/salads.   Recheck INR again on Monday, 6/21 to coordinate with other apt that day, then will do [place] (need to get medication list updated and further information from son)    Referring is Dr. Shy Fontana   INR (no units)   Date Value   06/14/2021 1.5   06/11/2021 2.20   06/09/2021 2.60   06/07/2021 2.10   06/04/2021 1.40     For Pharmacy Admin Tracking Only     Intervention Detail: Dose Adjustment: 1: reason: Therapy Optimization   Total # of Interventions Recommended: 1   Total # of Interventions Accepted: 1   Time Spent (min): 45

## 2021-06-14 NOTE — TELEPHONE ENCOUNTER
Robert Baker called from MD office. Wanted to make sure that clinic was checking INR today in clinic. Verified pt has appt today in clinic. She states that they dosed pt on fri 6/11. Currently is holding warfarin for thoracentesis on 6/17. Started holding 6/12. Want pt to get INR checked to verify that trending down to 2 per md.     Pt still has HH, unclear as to how long pt will be in [facility]. After initial appt in clinic, will continue monitoring with HH while seeing pt.

## 2021-06-14 NOTE — TELEPHONE ENCOUNTER
Spoke w/[facility]- pt has clinic visit today, 14June and will ck INR at that time. Aware of warfarin hold for US Thoracentesis 17June. Hazel Salazar [facility] aware of all - will follow up w/F antico clinic after thoracentesis regarding warfarin dosing. Pt warfarin management transferred to [facility] anticoag clinic.

## 2021-06-17 ENCOUNTER — HOSPITAL ENCOUNTER (OUTPATIENT)
Dept: INTERVENTIONAL RADIOLOGY/VASCULAR | Age: 61
Discharge: HOME | End: 2021-06-17
Payer: COMMERCIAL

## 2021-06-17 ENCOUNTER — HOSPITAL ENCOUNTER (OUTPATIENT)
Dept: GENERAL RADIOLOGY | Age: 61
Discharge: HOME | End: 2021-06-17
Payer: COMMERCIAL

## 2021-06-17 VITALS
DIASTOLIC BLOOD PRESSURE: 69 MMHG | HEART RATE: 73 BPM | HEIGHT: 69 IN | SYSTOLIC BLOOD PRESSURE: 142 MMHG | RESPIRATION RATE: 16 BRPM | OXYGEN SATURATION: 94 % | TEMPERATURE: 97.9 F | WEIGHT: 190.13 LBS | BODY MASS INDEX: 28.16 KG/M2

## 2021-06-17 DIAGNOSIS — I71.40 ABDOMINAL AORTIC ANEURYSM (AAA) WITHOUT RUPTURE: ICD-10-CM

## 2021-06-17 DIAGNOSIS — J18.1 CONSOLIDATION OF LEFT LOWER LOBE OF LUNG (HCC): ICD-10-CM

## 2021-06-17 DIAGNOSIS — Z98.890 STATUS POST THORACENTESIS: ICD-10-CM

## 2021-06-17 DIAGNOSIS — R93.89 ABNORMAL FINDING ON CHEST XRAY: ICD-10-CM

## 2021-06-17 DIAGNOSIS — Z95.1 S/P CORONARY ARTERY BYPASS GRAFT X 4: ICD-10-CM

## 2021-06-17 LAB
GLUCOSE BLD-MCNC: 108 MG/DL (ref 70–99)
PERFORMED ON: ABNORMAL

## 2021-06-17 PROCEDURE — 7100000011 HC PHASE II RECOVERY - ADDTL 15 MIN

## 2021-06-17 PROCEDURE — 7100000010 HC PHASE II RECOVERY - FIRST 15 MIN

## 2021-06-17 PROCEDURE — 6370000000 HC RX 637 (ALT 250 FOR IP): Performed by: PHYSICIAN ASSISTANT

## 2021-06-17 PROCEDURE — 2500000003 HC RX 250 WO HCPCS: Performed by: PHYSICIAN ASSISTANT

## 2021-06-17 PROCEDURE — 71045 X-RAY EXAM CHEST 1 VIEW: CPT

## 2021-06-17 PROCEDURE — C1729 CATH, DRAINAGE: HCPCS

## 2021-06-17 PROCEDURE — 32555 ASPIRATE PLEURA W/ IMAGING: CPT

## 2021-06-17 RX ORDER — ACETAMINOPHEN 325 MG/1
650 TABLET ORAL EVERY 4 HOURS PRN
Status: DISCONTINUED | OUTPATIENT
Start: 2021-06-17 | End: 2021-06-18 | Stop reason: HOSPADM

## 2021-06-17 RX ORDER — LIDOCAINE HYDROCHLORIDE 10 MG/ML
10 INJECTION, SOLUTION EPIDURAL; INFILTRATION; INTRACAUDAL; PERINEURAL ONCE
Status: COMPLETED | OUTPATIENT
Start: 2021-06-17 | End: 2021-06-17

## 2021-06-17 RX ADMIN — LIDOCAINE HYDROCHLORIDE 10 ML: 10 INJECTION, SOLUTION EPIDURAL; INFILTRATION; INTRACAUDAL; PERINEURAL at 11:34

## 2021-06-17 RX ADMIN — ACETAMINOPHEN 650 MG: 325 TABLET ORAL at 12:10

## 2021-06-17 ASSESSMENT — PAIN - FUNCTIONAL ASSESSMENT: PAIN_FUNCTIONAL_ASSESSMENT: 0-10

## 2021-06-17 ASSESSMENT — PAIN SCALES - GENERAL: PAINLEVEL_OUTOF10: 8

## 2021-06-17 NOTE — PROGRESS NOTES
Patient c/o 8/10 pain at thoracentesis site, vss, Radiologist notified, Tylenol given per order, will monitor

## 2021-06-17 NOTE — PROGRESS NOTES
Pt had a POCT INR drawn on 6/14/21. Result in chart. Called radiology and spoke with Magdy Howell, radiology nurse. Magdy Howell states not to draw PT/PTT/INR as ordered for today.

## 2021-06-17 NOTE — BRIEF OP NOTE
Brief Postoperative Note    dOilia Mendez  YOB: 1960  [id number]    Pre-operative Diagnosis:  left Pleural effusion    Post-operative Diagnosis: Same    Procedure: US Guided Thoracentesis    Anesthesia: Local    Surgeons/Assistants: ROSANA HOLDER PA-C, MD    Estimated Blood Loss: less than 5    Complications: None    Specimens: Was Obtained: 1 L of serosanguinous Pleural Fluid    Findings: Technically successful left Thoracentesis    Electronically signed by Roise Schilder, PA-C on 6/17/2021 at 11:47 AM

## 2021-06-17 NOTE — PROGRESS NOTES
Patient states pain is 3/10, states he is ready for d/c  Discharge instructions reviewed and understanding verbalized per pt/family with copy given. All home medications/new prescriptions have been reviewed, questions answered and patient/family state understanding.  Medication information sheet provided for new prescriptions received when applicable

## 2021-06-17 NOTE — PROGRESS NOTES
1000ml of fluid removed  Spoke to patients LEW sevilla and advised her the amount of fluid removed and that patient was returning to the floor.

## 2021-06-17 NOTE — PROGRESS NOTES
Patient/report received from [facility] PACU RN, yasmine, a/o, denies pain/needs, drsg to [facility] c/d/i, call light in reach, will await CXR results

## 2021-06-18 ENCOUNTER — TELEPHONE (OUTPATIENT)
Dept: CARDIOTHORACIC SURGERY | Age: 61
End: 2021-06-18

## 2021-06-18 NOTE — TELEPHONE ENCOUNTER
Received call from April Clustrix, [facility] with [employer] . Patient complaining of insomnia. Not related to pain or other post op symptoms. Just unable to fall asleep. He has tried the bed and recliner. Has tried Benadryl w/ pain medication and Melatonin with no relief. Spoke to Dr. Anton Vera today. He would prefer to refer patient to PCP for possible sleep aid prescription. Patient has already exhausted all of our options for assistance with sleep. Had to leave message on Bridgett's confidential voicemail with above recommendations. Left our office phone number to call back with any questions or concerns.

## 2021-06-21 ENCOUNTER — TELEPHONE (OUTPATIENT)
Dept: CARDIOLOGY CLINIC | Age: 61
End: 2021-06-21

## 2021-06-21 ENCOUNTER — TELEPHONE (OUTPATIENT)
Dept: FAMILY MEDICINE CLINIC | Age: 61
End: 2021-06-21

## 2021-06-21 ENCOUNTER — ANTI-COAG VISIT (OUTPATIENT)
Dept: PHARMACY | Age: 61
End: 2021-06-21
Payer: COMMERCIAL

## 2021-06-21 LAB — INTERNATIONAL NORMALIZATION RATIO, POC: 1.4

## 2021-06-21 PROCEDURE — 85610 PROTHROMBIN TIME: CPT

## 2021-06-21 PROCEDURE — 99212 OFFICE O/P EST SF 10 MIN: CPT

## 2021-06-21 RX ORDER — TRAZODONE HYDROCHLORIDE 50 MG/1
50 TABLET ORAL NIGHTLY PRN
Qty: 30 TABLET | Refills: 5 | Status: SHIPPED | OUTPATIENT
Start: 2021-06-21 | End: 2021-08-31

## 2021-06-21 NOTE — TELEPHONE ENCOUNTER
Johnna calling to get a verbal order to have one more visit this week with pt. Pt is still having some issues with his leg and she thinks one more visit would help then she will discharge him. Can she get the verbal order?  Pls call to advise Thank you

## 2021-06-21 NOTE — TELEPHONE ENCOUNTER
Dr. Carolee Salguero office needs to give the order. Patient cancelled his appt with ADRIENNE today and has not been seen by us since surgery.

## 2021-06-21 NOTE — PROGRESS NOTES
Mr. Reed Gracia is a 59 y.o. y/o male with history of Afib who presents today for anticoagulation monitoring and adjustment. Pertinent PMH: 59 y. o. male with significant past medical history of DM2, diverticulosis, HTN, HLD, AAA (4.3cm 9/2019, follows with Dr. Nichol Johns), elevated PSA (urethral dilation, on flomax)  who presents for an outpatient left heart angiogram on 5/21 with chest pain. Patient had his annual PCP visit where he was found to be hypertensive and complained of sharp chest and bilateral arm pain. PCP referred him to cardiology for further work-up.  Angiogram found  severe multivessel coronary artery disease. CVTS was consulted for surgical evaluation for myocardial revascularization. Patient is a former smoker. Pt states he has been on warfarin since his surgery (developed afib) 5/25. Was being monitored by Dr. Marvin Hines until now. Of note pt was also started on Amiodarone after procedure- looks like instructions are to take for 21 days, will have son address this question prior to next apt. Patient Reported Findings:  Yes     No  [x]   []       Patient verifies current dosing regimen as listed- Son does medications, states they have been doing 3mg daily since beginning of June.  Looked at [facility] dosing from Dr. Marvin Hines and she had pt taking 3mg daily from 6/3-6/8 then 2mg 6/9-6/11, and then had pt hold Sat 6/12 and Sun 6/13 for upcoming procedure---> states followed AVS   []   [x]       S/S bleeding/bruising/swelling/SOB- denies   []   [x]       Blood in urine or stool- denies   []   [x]       Procedures scheduled in the future at this time- thoracentesis on 6/17, holding since 6/12  []   [x]       Missed Dose- was off warfarin for procedure 6/17  []   [x]       Extra Dose  []   [x]       Change in medications- son does medications, will have son look at list for next time and let know if there are changes, can see he is on amiodarone ---> son states correct, need to confirm Amiodarone    []

## 2021-06-21 NOTE — TELEPHONE ENCOUNTER
----- Message from Paul Gold sent at 6/18/2021  4:25 PM EDT -----  Subject: Message to Provider    QUESTIONS  Information for Provider? patient had open heart surgery and is not   sleeping the nurse reached out and the surgeon Dr Luis Hermosillo try to help   get it under control but feels further medication needs to go through   primary care   ---------------------------------------------------------------------------  --------------  [address]  What is the best way for the office to contact you? OK to leave message on   voicemail  Preferred Call Back Phone Number? [phone]  ---------------------------------------------------------------------------  --------------  SCRIPT ANSWERS  Relationship to Patient? Third Party  Representative Name?  Myles Burch [facility] home care

## 2021-06-22 ENCOUNTER — TELEPHONE (OUTPATIENT)
Dept: CARDIOTHORACIC SURGERY | Age: 61
End: 2021-06-22

## 2021-06-22 NOTE — TELEPHONE ENCOUNTER
[facility], ph: [phone] states pt has not been walking as much as prescribed per post op regimen - c/o Lt Knee pain  - not muscular, strictly located at patella area; states pt has been walking up and down hills/grades in back yard, but only 3 mins or less. Ok to extend in home PT as needed to facilitate adequate rehab/ambulation.

## 2021-06-23 ENCOUNTER — OFFICE VISIT (OUTPATIENT)
Dept: VASCULAR SURGERY | Age: 61
End: 2021-06-23
Payer: COMMERCIAL

## 2021-06-23 VITALS
WEIGHT: 190 LBS | HEIGHT: 69 IN | BODY MASS INDEX: 28.14 KG/M2 | DIASTOLIC BLOOD PRESSURE: 78 MMHG | SYSTOLIC BLOOD PRESSURE: 128 MMHG

## 2021-06-23 DIAGNOSIS — I71.40 AAA (ABDOMINAL AORTIC ANEURYSM) WITHOUT RUPTURE: Primary | ICD-10-CM

## 2021-06-23 DIAGNOSIS — Z95.1 S/P CABG X 4: ICD-10-CM

## 2021-06-23 DIAGNOSIS — J90 PLEURAL EFFUSION: Primary | ICD-10-CM

## 2021-06-23 PROCEDURE — 99213 OFFICE O/P EST LOW 20 MIN: CPT | Performed by: SURGERY

## 2021-06-23 NOTE — PROGRESS NOTES
[facility])   Vascular Surgery Followup    Referring Provider:  Cadence Chicas MD     Chief Complaint   Patient presents with    Follow-up        History of Present Illness:  43-year-old male here today for follow-up with history of abdominal aortic aneurysm. Underwent routine surveillance imaging and is here today to discuss the results. He recently underwent four-vessel CABG May 25, 2021 still with fatigue however is recovering well. He denies abdominal or back pain. Denies claudication. Past Medical History:   has a past medical history of AAA (abdominal aortic aneurysm) ([address] Aneurysm of descending thoracic aorta ([address] Arthritis, Atherosclerosis of aorta ([address] Back pain, Diabetes mellitus ([address] Diverticulosis, Elevated PSA, Hyperlipidemia, Hypertension, and Polyneuropathy associated with underlying disease ([address] Surgical History:   has a past surgical history that includes Upper gastrointestinal endoscopy (11/30/15); Urethra dilation (5/2017, 7/2017); Cardiac catheterization; Upper gastrointestinal endoscopy (N/A, 10/12/2018); Coronary artery bypass graft (N/A, 5/25/2021); and Cystoscopy (5/25/2021). Social History:   reports that he quit smoking about 11 years ago. His smoking use included cigarettes. He has a 20.00 pack-year smoking history. He has never used smokeless tobacco. He reports that he does not drink alcohol and does not use drugs. Family History:  family history is not on file. Home Medications:  Current Outpatient Medications   Medication Sig Dispense Refill    traZODone (DESYREL) 50 MG tablet Take 1 tablet by mouth nightly as needed for Sleep 30 tablet 5    warfarin (COUMADIN) 1 MG tablet Take 3 tablets by mouth daily 60 tablet 1    acetaminophen (TYLENOL) 500 MG tablet Take 2 tablets by mouth every 6 hours 120 tablet 3    metoprolol tartrate 37.5 MG TABS Take 37.5 mg by mouth 2 times daily Hold for SBP <100.  HR < 70 60 tablet 3    sennosides-docusate sodium (SENOKOT-S) 8.6-50 MG tablet Take 1 tablet by mouth 2 times daily 60 tablet 0    pantoprazole (PROTONIX) 40 MG tablet Take 1 tablet by mouth daily 30 tablet 0    amiodarone (CORDARONE) 200 MG tablet Take 1 tablet by mouth daily for 21 days 21 tablet 0    warfarin (COUMADIN) 2.5 MG tablet Take 1 tablet by mouth daily Take 2.5 mg tonight. [facility] to draw INR tomorrow and further dosing instructions after 30 tablet 3    rosuvastatin (CRESTOR) 20 MG tablet Take 1 tablet by mouth nightly 30 tablet 3    ferrous sulfate (IRON 325) 325 (65 Fe) MG tablet Take 1 tablet by mouth daily (with breakfast) 30 tablet 5    tamsulosin (FLOMAX) 0.4 MG capsule TAKE TWO CAPSULES BY MOUTH DAILY 60 capsule 4    metFORMIN (GLUCOPHAGE) 1000 MG tablet Take 1 tablet by mouth 2 times daily (with meals) 180 tablet 3    aspirin 81 MG tablet Take 81 mg by mouth daily      traMADol (ULTRAM) 50 MG tablet Take 50 mg by mouth every 6 hours as needed. No current facility-administered medications for this visit. Allergies:  Patient has no known allergies. Review of Systems:   · Constitutional: there has been no unanticipated weight loss. There's been no change in energy level, sleep pattern, or activity level. · Eyes: No visual changes or diplopia. No scleral icterus. · ENT: No Headaches, hearing loss or vertigo. No mouth sores or sore throat. · Cardiovascular: Reviewed in HPI  · Respiratory: No cough or wheezing, no sputum production. No hematemesis. · Gastrointestinal: No abdominal pain, appetite loss, blood in stools. No change in bowel or bladder habits. · Genitourinary: No dysuria, trouble voiding, or hematuria. · Musculoskeletal:  No gait disturbance, weakness or joint complaints. · Integumentary: No rash or pruritis. · Neurological: No headache, diplopia, change in muscle strength, numbness or tingling. No change in gait, balance, coordination, mood, affect, memory, mentation, behavior.   · Psychiatric: No anxiety, no depression. · Endocrine: No malaise, fatigue or temperature intolerance. No excessive thirst, fluid intake, or urination. No tremor. · Hematologic/Lymphatic: No abnormal bruising or bleeding, blood clots or swollen lymph nodes. · Allergic/Immunologic: No nasal congestion or hives. Physical Examination:    Vitals:    06/23/21 1016   BP: 128/78          General appearance: alert, appears stated age, cooperative and no distress  Head: Normocephalic, without obvious abnormality, atraumatic  Neck: no adenopathy, no carotid bruit, no JVD, supple, symmetrical, trachea midline and thyroid: not enlarged, symmetric, no tenderness/mass/nodules  Lungs: clear to auscultation bilaterally  Heart: regular rate and rhythm, S1, S2 normal, no murmur, click, rub or gallop  Abdomen: soft, non-tender. Bowel sounds normal. No masses,  no organomegaly  Extremities: extremities normal, atraumatic, no cyanosis or edema    Pulses:   L brachial 2 R brachial 2   L radial 2 R radial 2   L femoral 2 R femoral 2   L popliteal 2 R popliteal 2   L posterior tibial 2 R posterior tibial 2   L dorsalis pedis 2 R dorsalis pedis 2   Doppler Signals:  +    Neurologic: Grossly normal    MEDICAL DECISION MAKING/TESTING  I have reviewed the testing personally and my interpretation is below. 1. Abdominal aortic aneurysm now measures 4.8 cm, previously 4.3 cm. Recommend follow-up every 6 months and vascular consultation. (J Am Dayan   Radiol 2013;10 (65):[id number].)   2. Small penetrating aortic ulcer in the distal descending thoracic aorta. 3. No change in the appearance of the short dissections in both common iliac   arteries. 4. Diverticulosis without scan evidence for diverticulitis.          Assessment:     Patient Active Problem List   Diagnosis    Chest pain    Type 2 diabetes mellitus with diabetic polyneuropathy, without long-term current use of insulin (HCC)    Essential hypertension    Hypercholesteremia    Former smoker  Medical non-compliance    Diverticulosis    Polyneuropathy associated with underlying disease ([facility]    Aneurysm of descending thoracic aorta (HCC)    Atherosclerosis of aorta (HCC)    Unstable angina ([facility]       Plan:  1. AAA (abdominal aortic aneurysm) without rupture [facility])  57-year-old male with slight interval growth of infrarenal abdominal aortic aneurysm. No need for further intervention at this time. CT scan findings discussed in detail with him and his wife. All questions answered. Plan for repeat surveillance imaging in 6 months  - CTA ABDOMEN PELVIS W WO CONTRAST; Future        Thank you for allowing me to participate in the care of this individual.  Please do not hesitate to contact me with any questions. Dawood Beaver M.D., FACS.  6/23/2021  10:20 AM

## 2021-06-24 ENCOUNTER — ANTI-COAG VISIT (OUTPATIENT)
Dept: PHARMACY | Age: 61
End: 2021-06-24

## 2021-06-24 ENCOUNTER — TELEPHONE (OUTPATIENT)
Dept: CARDIOTHORACIC SURGERY | Age: 61
End: 2021-06-24

## 2021-06-24 LAB — INR BLD: 2

## 2021-06-24 NOTE — TELEPHONE ENCOUNTER
[facility] home care nurse, Taco Burroughs, called to office regarding patient's mid-sternal incision tape. The tape seems to be peeling up and getting slightly \"gunky. \" Patient is requesting to have the tape removed. RN informed Taco Burroughs that she can remove the tape. Recommended that they remove it very slowly and gently, then cleanse the site with an anti-bacterial soap very gently and pat dry. Taco Burroughs verbalized understanding.

## 2021-06-28 ENCOUNTER — TELEPHONE (OUTPATIENT)
Dept: CARDIOTHORACIC SURGERY | Age: 61
End: 2021-06-28

## 2021-06-28 ENCOUNTER — ANTI-COAG VISIT (OUTPATIENT)
Dept: PHARMACY | Age: 61
End: 2021-06-28

## 2021-06-28 LAB — INR BLD: 2.9

## 2021-06-28 NOTE — PROGRESS NOTES
PT 34.9 / INR 2.9 Patient taking 3mg warfarin daily.  Please call [address] [place] [address] [phone] with warfarin dosing and order for next INR check. Called Bridgett back. Instructed to have patient take 2mg tonight then continue taking 3mg daily since INR was 2.9 which is within goal range of 2-3. Will check INR again on Thurs, 7/1. Most likely will be last HH.      Dallas Earl, PharmD, [facility] Tracking Only     Intervention Detail: Dose Adjustment: 1, reason: Therapy Optimization   Total # of Interventions Recommended: 1   Total # of Interventions Accepted: 1   Time Spent (min): 15

## 2021-06-28 NOTE — TELEPHONE ENCOUNTER
Call received from Nickolas Puri with [facility] reporting that pt BP this am 2 hrs after meds was 170/70. Approximately 2 hrs after that the BP was 154/85. Has appt with Dr. Lico Matamoros tomorrow am. Nickolas Puri also reported that the left lung fields are diminished. Pt to get CXR prior to visit tomorrow.

## 2021-06-29 ENCOUNTER — OFFICE VISIT (OUTPATIENT)
Dept: CARDIOTHORACIC SURGERY | Age: 61
End: 2021-06-29

## 2021-06-29 ENCOUNTER — HOSPITAL ENCOUNTER (OUTPATIENT)
Dept: GENERAL RADIOLOGY | Age: 61
Discharge: HOME | End: 2021-06-29
Payer: COMMERCIAL

## 2021-06-29 ENCOUNTER — HOSPITAL ENCOUNTER (OUTPATIENT)
Age: 61
Discharge: HOME | End: 2021-06-29
Payer: COMMERCIAL

## 2021-06-29 VITALS
OXYGEN SATURATION: 96 % | HEART RATE: 66 BPM | DIASTOLIC BLOOD PRESSURE: 80 MMHG | SYSTOLIC BLOOD PRESSURE: 148 MMHG | BODY MASS INDEX: 28.44 KG/M2 | TEMPERATURE: 98.2 F | HEIGHT: 69 IN | WEIGHT: 192 LBS

## 2021-06-29 DIAGNOSIS — Z95.1 S/P CABG X 4: Primary | ICD-10-CM

## 2021-06-29 DIAGNOSIS — Z95.1 S/P CABG X 4: ICD-10-CM

## 2021-06-29 DIAGNOSIS — J90 PLEURAL EFFUSION: ICD-10-CM

## 2021-06-29 PROCEDURE — 99024 POSTOP FOLLOW-UP VISIT: CPT | Performed by: THORACIC SURGERY (CARDIOTHORACIC VASCULAR SURGERY)

## 2021-06-29 PROCEDURE — 71046 X-RAY EXAM CHEST 2 VIEWS: CPT

## 2021-06-29 NOTE — PROGRESS NOTES
Progress Note    CC: 2nd Postoperative follow-up    S/P s/p CABG X 4, SANDRO Clip 25May, dc'd 2 June. Subjective:   Patient states that he is feeling better since his visit at my office few weeks ago. At that point was complaining of some mild shortness of breath and persistent cough and after chest x-ray was done he underwent an ultrasound-guided left-sided thoracentesis as an outpatient on 6/17/2021 with removal of 1 L of fluid from his left chest.  He reports that ever since his shortness of breath has pretty much completely resolved and he is also not reporting any cough at this point. He denies any exertional chest pain,  syncope, presyncope, palpitations, cough, hemoptysis,  orthopnea, paroxysmal nocturnal dyspnea, drainage from his incisions, fevers, chills or any other constitutional symptoms. He mentions that he gets somewhat fatigued after walking a certain distance but this is getting better and better as the time goes by. He still has problems falling asleep at night and his sleep pattern has not allowed him to get a good night rest but this is also slowly getting better. Vital Signs:                                                 BP (!) 148/80 (Site: Right Upper Arm)   Pulse 66   Temp 98.2 °F (36.8 °C) (Infrared)   Ht 5' 9\" (1.753 m)   Wt 192 lb (87.1 kg)   SpO2 96%   BMI 28.35 kg/m²        Chest x-ray from earlier today shows a small stable left pleural effusion with otherwise well-expanded lungs. CV:   Regular rate and rhythm with no rubs or murmurs. Pulm: Clear lung fields with no rales or rhonchi. Incisions:    Sternotomy incision is clean, dry and intact. Sternum is stable. Abd:  Soft  Ext: Leg incision is clean, dry and intact. No peripheral edema. Assessment/Plan:  Overall doing very well post CABG x4 and left atrial appendage exclusion with an AtriCure clip on May 25, 2021.   His respiratory symptoms resolved after ultrasound-guided thoracentesis of moderate to large side left pleural effusion. On repeat chest x-ray today I can only appreciate small residual left effusion with otherwise well expanded lungs. It was explained to him that within the next few weeks he will benefit from a referral to our outpatient cardiac rehab program which will help him build some more stamina and exercise capacity. The patient may increase activities as he feels comfortable doing so. I stressed to him to follow religiously the sternal precautions for the next 6 weeks. Follow-up with Mirella Horta and Dr. Isela Carl  as prescribed. Follow-up: Mr April Miller and his son who is present during the visit are aware that they are more than welcome to call my office and/or schedule another visit anytime in the future should there be any questions or concerns.     Brock Samaniego MD  6/29/2021  3:28 PM

## 2021-07-01 ENCOUNTER — ANTI-COAG VISIT (OUTPATIENT)
Dept: PHARMACY | Age: 61
End: 2021-07-01

## 2021-07-01 DIAGNOSIS — Z95.1 S/P CABG (CORONARY ARTERY BYPASS GRAFT): ICD-10-CM

## 2021-07-01 DIAGNOSIS — I10 ESSENTIAL HYPERTENSION: Primary | ICD-10-CM

## 2021-07-01 LAB — INR BLD: 2.3

## 2021-07-01 RX ORDER — PANTOPRAZOLE SODIUM 40 MG/1
TABLET, DELAYED RELEASE ORAL
Qty: 30 TABLET | Refills: 0 | OUTPATIENT
Start: 2021-07-01

## 2021-07-01 RX ORDER — AMLODIPINE BESYLATE 5 MG/1
5 TABLET ORAL DAILY
Qty: 30 TABLET | Refills: 0 | Status: SHIPPED | OUTPATIENT
Start: 2021-07-01 | End: 2021-08-31 | Stop reason: SDUPTHER

## 2021-07-01 NOTE — TELEPHONE ENCOUNTER
Mely Mon RN with [facility] called in regards to elevated BP. BP today is 182/78  HR 65 and on recheck was 176/82. Patient had already taken his BP medications this morning. She reports that his BP has been creeping up over the past few weeks. Most recently, BP on Monday was 170/70 and yesterday's BP was 153/86. He is currently taking Metoprolol 37.5 mg bid. Lisinopril 2.5 mg had been discontinued due to a tickle cough. I reviewed with Dr. Kristyn Alvarez and he instructed for patient to start home dose Amlodipine and discuss BP issues with Cardiology at upcoming appointment. Patient was on Amlodipine 5 mg daily prior to surgery. He does have 4 tablets on hand at home. A 30 day prescription was escribed to his [facility] [address] with future refills to come from Cardiology. He was also instructed to contact Dr. Esdras Childress office to make an appointment. Phone number was provided.

## 2021-07-08 ENCOUNTER — ANTI-COAG VISIT (OUTPATIENT)
Dept: PHARMACY | Age: 61
End: 2021-07-08

## 2021-07-08 LAB — INR BLD: 1.6

## 2021-07-08 NOTE — PROGRESS NOTES
Caitlin w/ [facility] left v/m w/ pts INR results of 1.6, pt took 2 mgs of warfarin on Monday and 3 mgs all other days. 89 Leana Brandt back. Instructed to have patient take 4mg tonight then take 2mg on Mon and 3mg all other days since INR was 1.6 which is below goal range of 2-3. Will check INR again in 1 week, 7/14.       Wendi Mccormack, PharmD, [facility] Tracking Only     Intervention Detail: Dose Adjustment: 1, reason: Therapy Optimization   Total # of Interventions Recommended: 1   Total # of Interventions Accepted: 1   Time Spent (min): 15

## 2021-07-13 ENCOUNTER — TELEPHONE (OUTPATIENT)
Dept: CARDIOTHORACIC SURGERY | Age: 61
End: 2021-07-13

## 2021-07-14 ENCOUNTER — ANTI-COAG VISIT (OUTPATIENT)
Dept: PHARMACY | Age: 61
End: 2021-07-14

## 2021-07-14 LAB — INR BLD: 1.5

## 2021-07-14 NOTE — PROGRESS NOTES
PT 17.4 / INR 1.5 Patent took 4mg warfarin on Thursday and 3mg all other days. [facility] call [facility] [phone] with warfarin dosing and order for next INR check. Ran out of amiodarone 6/24? Returned call to [facility]. Instructed for patient to take 4 mg on Mon, Wed and Fri and 3 mg all other days of the week. Recheck INR in 1 week, 7/21.  Will be last visit, then will need to r/s back to clinic     For Pharmacy Admin Tracking Only     Intervention Detail: Dose Adjustment: 1, reason: Therapy Optimization   Total # of Interventions Recommended: 1   Total # of Interventions Accepted: 1   Time Spent (min): 15

## 2021-07-21 ENCOUNTER — ANTI-COAG VISIT (OUTPATIENT)
Dept: PHARMACY | Age: 61
End: 2021-07-21

## 2021-07-21 LAB — INR BLD: 2

## 2021-07-21 NOTE — PROGRESS NOTES
Terelaw Collazo w/[facility] called w/ INR results of 2.0 , PT of 23.5. Pt took 4 mgs of warfarin on M/W/F and 3 mgs all other days. No med or diet changes. Please call pt w/ dosing instructions and next protime this is his last [facility] visit. If you need to s/w Tere Collazo her # is [phone]. Pt is also out of Warfarin as of today, would like RF called into [facility] on [phone]. [phone] (spoke with son) and Tere Collazo. Instructed to have patient take 4mg MWF and 3mg AOD since INR was 2.0 which is within goal range of 2-3. Will check INR again in 1 week, 7/28 to coordinate with apt that day. RF called into [facility].      Briana Adams, PharmD, [facility] Tracking Only     Intervention Detail: Refill(s) Provided   Total # of Interventions Recommended: 1   Total # of Interventions Accepted: 1   Time Spent (min): 15

## 2021-07-21 NOTE — TELEPHONE ENCOUNTER
Warfarin prescription phoned into [address] [phone] - F [phone] under Dr. Gab Tsai, [phone]  Warfarin 1 mg tabs  Take 4 mg (1 mg x 4) every Mon, Wed, Fri; 3 mg (1 mg x 3) all other days  90 days   2 refills    Ernie Ayala, PebblesD, [facility]

## 2021-07-28 ENCOUNTER — ANTI-COAG VISIT (OUTPATIENT)
Dept: PHARMACY | Age: 61
End: 2021-07-28
Payer: COMMERCIAL

## 2021-07-28 ENCOUNTER — OFFICE VISIT (OUTPATIENT)
Dept: CARDIOLOGY CLINIC | Age: 61
End: 2021-07-28
Payer: COMMERCIAL

## 2021-07-28 VITALS
HEART RATE: 81 BPM | WEIGHT: 183.7 LBS | BODY MASS INDEX: 27.21 KG/M2 | OXYGEN SATURATION: 99 % | SYSTOLIC BLOOD PRESSURE: 160 MMHG | DIASTOLIC BLOOD PRESSURE: 70 MMHG | HEIGHT: 69 IN

## 2021-07-28 DIAGNOSIS — I25.10 CORONARY ARTERY DISEASE INVOLVING NATIVE CORONARY ARTERY OF NATIVE HEART WITHOUT ANGINA PECTORIS: Primary | ICD-10-CM

## 2021-07-28 DIAGNOSIS — I48.0 PAROXYSMAL ATRIAL FIBRILLATION (HCC): ICD-10-CM

## 2021-07-28 DIAGNOSIS — E78.2 MIXED HYPERLIPIDEMIA: ICD-10-CM

## 2021-07-28 DIAGNOSIS — I10 ESSENTIAL HYPERTENSION: ICD-10-CM

## 2021-07-28 PROBLEM — I48.91 ATRIAL FIBRILLATION (HCC): Status: ACTIVE | Noted: 2021-05-12

## 2021-07-28 LAB — INTERNATIONAL NORMALIZATION RATIO, POC: 1.3

## 2021-07-28 PROCEDURE — 99212 OFFICE O/P EST SF 10 MIN: CPT

## 2021-07-28 PROCEDURE — 93000 ELECTROCARDIOGRAM COMPLETE: CPT | Performed by: NURSE PRACTITIONER

## 2021-07-28 PROCEDURE — 99214 OFFICE O/P EST MOD 30 MIN: CPT | Performed by: NURSE PRACTITIONER

## 2021-07-28 PROCEDURE — 85610 PROTHROMBIN TIME: CPT

## 2021-07-28 RX ORDER — ROSUVASTATIN CALCIUM 40 MG/1
40 TABLET, COATED ORAL NIGHTLY
Qty: 90 TABLET | Refills: 1 | Status: SHIPPED | OUTPATIENT
Start: 2021-07-28 | End: 2021-08-31

## 2021-07-28 RX ORDER — METOPROLOL TARTRATE 37.5 MG/1
37.5 TABLET, FILM COATED ORAL 2 TIMES DAILY
Qty: 60 TABLET | Refills: 3 | Status: CANCELLED | OUTPATIENT
Start: 2021-07-28

## 2021-07-28 RX ORDER — ROSUVASTATIN CALCIUM 20 MG/1
20 TABLET, COATED ORAL NIGHTLY
Qty: 30 TABLET | Refills: 3 | Status: CANCELLED | OUTPATIENT
Start: 2021-07-28

## 2021-07-28 RX ORDER — LISINOPRIL 20 MG/1
20 TABLET ORAL DAILY
Qty: 90 TABLET | Refills: 0 | Status: SHIPPED | OUTPATIENT
Start: 2021-07-28 | End: 2021-10-27

## 2021-07-28 RX ORDER — AMLODIPINE BESYLATE 5 MG/1
5 TABLET ORAL DAILY
Qty: 60 TABLET | Refills: 3 | Status: CANCELLED | OUTPATIENT
Start: 2021-07-28

## 2021-07-28 NOTE — PROGRESS NOTES
Mr. Rosemary Francis is a 59 y.o. y/o male with history of Afib who presents today for anticoagulation monitoring and adjustment. Pertinent PMH: 59 y. o. male with significant past medical history of DM2, diverticulosis, HTN, HLD, AAA (4.3cm 9/2019, follows with Dr. Linda West), elevated PSA (urethral dilation, on flomax)  who presents for an outpatient left heart angiogram on 5/21 with chest pain. Patient had his annual PCP visit where he was found to be hypertensive and complained of sharp chest and bilateral arm pain. PCP referred him to cardiology for further work-up.  Angiogram found  severe multivessel coronary artery disease. CVTS was consulted for surgical evaluation for myocardial revascularization. Patient is a former smoker. Pt states he has been on warfarin since his surgery (developed afib) 5/25. Was being monitored by Dr. Pippa Bae until now. Of note pt was also started on Amiodarone after procedure- looks like instructions are to take for 21 days, will have son address this question prior to next apt.      Patient Reported Findings:  Yes     No  [x]   []       Patient verifies current dosing regimen as listed- Son does medications --> spoke to son via telephone, verified correct dose  []   [x]       S/S bleeding/bruising/swelling/SOB- denies   []   [x]       Blood in urine or stool- denies   []   [x]       Procedures scheduled in the future at this time-    [x]   []       Missed Dose- son denies any missed doses   []   [x]       Extra Dose  [x]   []       Change in medications- son does medications, will have son look at list for next time and let know if there are changes, can see he is on amiodarone --> finished amiodarone on 6/24    []   [x]       Change in health/diet/appetite- doesn't like to eat them often   []   [x]       Change in alcohol use- doesn't drink or smoke   []   [x]       Change in activity  []   [x]       Hospital admission  []   [x]       Emergency department visit  []   [x]       Other complaints    Clinical Outcomes:  Yes     No  []   [x]       Major bleeding event  []   [x]       Thromboembolic event    Duration of warfarin Therapy: indefinite   INR Range:  2.0-3.0     Son does patient's medications- pt doesn't know anything about warfarin. Pt states son cannot attend apts dt working. Pt [facility] but has office visit on Monday 6/21 with cardio so will coordinate since pt is already going to be here. INR today is 1.3 despite denying any missed doses or acute changes   Possibly from amiodarone d/c 4 weeks ago? Unclear as have no established weekly dose for patient   Son does medications- pt doesn't know what dose he has been on, what tablets he has, or what time of day he takes warfarin --> spoke to son via telephone   Increase weekly dose to 3 mg on Sun and 4 mg all other days of the week (12% inc)  Encouraged to maintain a consistency of vegetables/salads.   Recheck INR again in 1 week, 8/4    Referring is Dr. Frederic Fountain   INR (no units)   Date Value   07/21/2021 2.00   07/14/2021 1.50   07/08/2021 1.60   07/01/2021 2.30     For Pharmacy Admin Tracking Only     Intervention Detail: Dose Adjustment: 1: reason: Therapy Optimization   Total # of Interventions Recommended: 1   Total # of Interventions Accepted: 1   Time Spent (min): 15

## 2021-07-28 NOTE — PROGRESS NOTES
[facility]     Outpatient Follow Up Note    CHIEF COMPLAINT / HPI: Hospital Follow Up secondary to status post CABG    Hospital record has been reviewed  Hospital Course progressed as follows per discharge summary:   The patient is a 59 y. o. male with significant past medical history of DM2, diverticulosis, HTN, HLD, AAA (4.3cm 9/2019, follows with Dr. Jody Boone), elevated PSA (urethral dilation, on flomax)   ~ presents for an outpatient left heart angiogram on 5/21 with chest pain. Patient had his annual PCP visit where he was found to be hypertensive and complained of sharp chest and bilateral arm pain. PCP referred him to cardiology for further work-up.    ~Angiogram found  severe multivessel coronary artery disease. CVTS was consulted for surgical evaluation for myocardial revascularization. Patient is a former smoker. [facility] Course: The patient underwent CABG X 4 and SANDRO on 5/25/2021. The patient's post-operative course was uneventful. Patient with urethral stricture that required urology to place arana catheter for surgery. He will need to follow-up with urology as an outpatient. Patient also has a 4.8cm AAA that will need follow-up with Dr. Jody Boone as an outpatient. Patient had post-op atrial fibrillation and was anticoagulated on coumadin and started on amiodarone. Patient instructed to get a CXR prior to his visit with Dr. Curry Escoto. Pain was controlled with combination of oral and IV medications. Patient was able to ambulate without difficulty and tolerate a regular diet. The patient was discharged on  6/2/2021. Joon Sears is 59 y.o. male who presents today for a routine follow up after a recent hospitalization related to the above mentioned issues. Subjective:   Since the time of discharge, the patient admits their symptoms have improved. He has chest wall pain and hurts to touch. The seat belt hurts. Lifting 12 pk of water hurts. He denies significant chest pain.  There is no SOB/SAL. He denies swelling. His wt is stable. He's just tired. He walks 1/2 mile bid. The patient is not experiencing palpitations. His BP runs ~ 145/72    These symptoms are improving over the last many weeks. With regard to medication therapy the patient has been compliant with prescribed regimen. They have tolerated therapy to date. Past Medical History:   Diagnosis Date    AAA (abdominal aortic aneurysm) ([facility] 2015    4.3 cm 2019. yearly CT    Aneurysm of descending thoracic aorta ([facility] [facility] 2018    2018: 3 cm    Arthritis     Atherosclerosis of aorta ([facility] [facility] 2018    extensive per CT 2018    Back pain     Diabetes mellitus ([facility]     Diverticulosis     Elevated PSA 10/19/2017    Hyperlipidemia     Hypertension     Polyneuropathy associated with underlying disease ([facility] [facility] 10/19/2017     Social History:    Social History     Tobacco Use   Smoking Status Former Smoker    Packs/day: 1.00    Years: 20.00    Pack years: 20.00    Types: Cigarettes    Quit date: 2009    Years since quittin.6   Smokeless Tobacco Never Used     Current Medications:  Current Outpatient Medications   Medication Sig Dispense Refill    amLODIPine (NORVASC) 5 MG tablet Take 1 tablet by mouth daily 30 tablet 0    traZODone (DESYREL) 50 MG tablet Take 1 tablet by mouth nightly as needed for Sleep 30 tablet 5    warfarin (COUMADIN) 1 MG tablet Take 3 tablets by mouth daily 60 tablet 1    acetaminophen (TYLENOL) 500 MG tablet Take 2 tablets by mouth every 6 hours 120 tablet 3    metoprolol tartrate 37.5 MG TABS Take 37.5 mg by mouth 2 times daily Hold for SBP <100. HR < 70 60 tablet 3    pantoprazole (PROTONIX) 40 MG tablet Take 1 tablet by mouth daily 30 tablet 0    warfarin (COUMADIN) 2.5 MG tablet Take 1 tablet by mouth daily Take 2.5 mg tonight.  [facility] to draw INR tomorrow and further dosing instructions after 30 tablet 3    rosuvastatin (CRESTOR) 20 MG tablet Take 1 tablet by mouth nightly 30 tablet 3    ferrous sulfate (IRON 325) 325 (65 Fe) MG tablet Take 1 tablet by mouth daily (with breakfast) 30 tablet 5    tamsulosin (FLOMAX) 0.4 MG capsule TAKE TWO CAPSULES BY MOUTH DAILY 60 capsule 4    metFORMIN (GLUCOPHAGE) 1000 MG tablet Take 1 tablet by mouth 2 times daily (with meals) 180 tablet 3    aspirin 81 MG tablet Take 81 mg by mouth daily      traMADol (ULTRAM) 50 MG tablet Take 50 mg by mouth every 6 hours as needed. No current facility-administered medications for this visit. REVIEW OF SYSTEMS:   CONSTITUTIONAL: No major weight gain or loss, fatigue, weakness, night sweats or fever. There's been no change in energy level, sleep pattern, or activity level. HEENT: No new vision difficulties or ringing in the ears. RESPIRATORY: No new SOB, PND, orthopnea or cough. CARDIOVASCULAR: See HPI  GI: No nausea, vomiting, diarrhea, constipation, abdominal pain or changes in bowel habits. : No urinary frequency, urgency, incontinence hematuria or dysuria. SKIN: No cyanosis or skin lesions. MUSCULOSKELETAL: No new muscle or joint pain. NEUROLOGICAL: No syncope or TIA-like symptoms. PSYCHIATRIC: No anxiety, pain, insomnia or depression    Objective:   PHYSICAL EXAM:        Vitals:    07/28/21 1449 07/28/21 1520   BP: (!) 170/70 (!) 160/70   Site: Right Upper Arm    Position: Sitting    Cuff Size: Large Adult    Pulse: 81    SpO2: 99%    Weight: 183 lb 11.2 oz (83.3 kg)    Height: 5' 9\" (1.753 m)        VITALS:  BP (!) 170/70 (Site: Right Upper Arm, Position: Sitting, Cuff Size: Large Adult)   Pulse 81   Ht 5' 9\" (1.753 m)   Wt 183 lb 11.2 oz (83.3 kg)   SpO2 99%   BMI 27.13 kg/m²     CONSTITUTIONAL: Cooperative, no apparent distress, and appears well nourished / developed  NEUROLOGIC:  Awake and orientated to person, place and time. PSYCH: Calm affect. SKIN: Warm and dry.   HEENT: Sclera non-icteric, normocephalic, neck supple, no elevation of JVP, normal carotid pulses with no bruits and thyroid normal size. LUNGS:  No increased work of breathing and clear to auscultation, no crackles or wheezing. CARDIOVASCULAR:  Regular rate 72  and rhythm with no murmurs, gallops, rubs, or abnormal heart sounds, normal PMI. The apical impulses not displaced. Heart tones are crisp and normal                                                                                          Cervical veins are not engorged                 JVP less than 8 cm H2O                                                                              The carotid upstroke is normal in amplitude and contour without delay or bruit    ABDOMEN:  Normal bowel sounds, non-distended and non-tender to palpation   EXT: No edema, no calf tenderness. Pulses are present bilaterally.     DATA:    Lab Results   Component Value Date    ALT 12 05/13/2021    AST 14 (L) 05/13/2021    ALKPHOS 61 05/13/2021    BILITOT <0.2 05/13/2021     Lab Results   Component Value Date    CREATININE 1.1 06/02/2021    BUN 28 (H) 06/02/2021     06/02/2021    K 4.0 06/02/2021    CL 96 (L) 06/02/2021    CO2 29 06/02/2021     Lab Results   Component Value Date    TSH 1.360 04/29/2019     Lab Results   Component Value Date    WBC 10.2 06/02/2021    HGB 8.7 (L) 06/02/2021    HCT 27.5 (L) 06/02/2021    MCV 74.6 (L) 06/02/2021     06/02/2021     No components found for: CHLPL  Lab Results   Component Value Date    TRIG 109 05/22/2021    TRIG 85 05/13/2021    TRIG 130 08/13/2020     Lab Results   Component Value Date    HDL 42 05/22/2021    HDL 52 05/13/2021    HDL 50 08/13/2020     Lab Results   Component Value Date    LDLCALC 113 (H) 05/22/2021    LDLCALC 163 (H) 05/13/2021    LDLCALC 185 (H) 08/13/2020     Lab Results   Component Value Date    LABVLDL 22 05/22/2021    LABVLDL 17 05/13/2021    LABVLDL 26 08/13/2020     Radiology Review:  Pertinent images / reports were reviewed as a part of this visit and reveals the following:    Angiogram: 5/21/2021  Artery Findings/Result   LM Normal   LAD 90% prox, 80% mid involving D1 ostial, 99% mid with ANTON 2 flow distal LAD   Cx 90% OM1, OM1 very ectatic   RI N/A   RCA 99% mid   LVEDP 10   LVG 55%, basal inferior hk        Carotid US: 5/21/21: karan ICA < 50%    Last Echo: 5/22/21  Summary   Overall left ventricular function is normal.   Ejection fraction is visually estimated to be 60 %. E/e'= 10.0   There is mild concentric left ventricular   The right ventricle is mildly enlarged. Normal right ventricular size and function. Mild to moderate tricuspid regurgitation. Estimated pulmonary artery systolic pressure is mildly to moderately   elevated at 35 mmHg assuming a right atrial pressure of 3 mmHg. Moderate pulmonic regurgitation present. The pulmonic valve is structurally normal.     Procedure:  5/25/2021  URGENT CABG CORONARY ARTERY BYPASS GRAFTING X 4, CISNEROS GRAFT TO LAD X 1, SEQUENTIAL VEIN GRAFT TO OM1 AND RPDA, VEIN GRAFT TO DIAGONAL WITH PROXIMAL ANASTIMOSIS END TO SIDE TO VEIN GRAFT TO OM1 AND RPDA, LIGATION SANDRO WITH 50 MM ATRICLIP    6/17/2021 thoracentesis: 1 L of serosanguinous Pleural Fluid    CXR: 6/29/21:     Impression   Impression:        Stable small-moderate left pleural effusion. Assessment:      Diagnosis Orders   1. Coronary artery disease involving native coronary artery of native heart without angina pectoris   ~s/p CABG with SANDRO clip May '21  ~progressing yet bothered by surgical discomfort chest wall  ~s/p Lt thoracentesis ; offers no c/o SOB. Lungs clear  ~ASA / BB / statin    2. Essential hypertension   ~elevated in office today  ~pt reports fluctuating numbers. Was ~ 137/ a few days ago  ~mprvasc / lopressor  ~had taken lisinopril 40 mg PTA lisinopril (PRINIVIL;ZESTRIL) 20 MG tablet    BASIC METABOLIC PANEL   3. Mixed hyperlipidemia   ~LDL not at goal ; would like to see < 70  ~crestor 20 mg daily  Lipid, Fasting    Hepatic Function Panel   4. Paroxysmal atrial fibrillation (HCC)   ~post op episode tx with amiodarone  ~warfarin manage by coumadin clinic: 3 mg on Sunday with 4 mg remaining days. Last INR 1.3 EKG 12 lead       Patient  is stable since hospital discharge. 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 In addition, I have reinforced the need for patient directed risk factor modification. Further evaluation will be based upon the patient's clinical course and testing results. All questions and concerns were addressed to the patient Alternatives to  treatment were discussed. The patient  currently  is not smoking. The risks related to smoking were reviewed with the patient. Recommend maintaining a smoke-free lifestyle. Antiplatelet therapy has been recommended / prescribed for this patient. Education conducted on adverse reactions including bleeding was discussed. The patient verbalizes understanding. Pt is on a BB  Pt is not on an ace-i/ARB : not resumed at discharge; suspect BP would not tolerate  Pt is on a statin      Saturated fat diet discussed  Exercise program discussed    Thank you for allowing to us to participate in the care of Kvng Stallworth.       [address]  Documentation of today's visit sent to PCP

## 2021-07-28 NOTE — PATIENT INSTRUCTIONS
Increase crestor to 40 mg daily then repeat your cholesterol levels after 6 weeks    Resume lisinopril at 20 mg daily ; then labs next week    appt in 6 weeks since you see your PCP in the near future

## 2021-08-03 ENCOUNTER — ANTI-COAG VISIT (OUTPATIENT)
Dept: PHARMACY | Age: 61
End: 2021-08-03
Payer: COMMERCIAL

## 2021-08-03 DIAGNOSIS — I48.91 ATRIAL FIBRILLATION, UNSPECIFIED TYPE (HCC): Primary | ICD-10-CM

## 2021-08-03 LAB — INTERNATIONAL NORMALIZATION RATIO, POC: 1.4

## 2021-08-03 PROCEDURE — 85610 PROTHROMBIN TIME: CPT

## 2021-08-03 PROCEDURE — 99212 OFFICE O/P EST SF 10 MIN: CPT

## 2021-08-03 NOTE — PROGRESS NOTES
Mr. Brian Grande is a 59 y.o. y/o male with history of Afib who presents today for anticoagulation monitoring and adjustment. Pertinent PMH: 59 y. o. male with significant past medical history of DM2, diverticulosis, HTN, HLD, AAA (4.3cm 9/2019, follows with Dr. Eveline Barreto), elevated PSA (urethral dilation, on flomax)  who presents for an outpatient left heart angiogram on 5/21 with chest pain. Patient had his annual PCP visit where he was found to be hypertensive and complained of sharp chest and bilateral arm pain. PCP referred him to cardiology for further work-up.  Angiogram found  severe multivessel coronary artery disease. CVTS was consulted for surgical evaluation for myocardial revascularization. Patient is a former smoker. Pt states he has been on warfarin since his surgery (developed afib) 5/25. Was being monitored by Dr. Alyssa Flores until now. Of note pt was also started on Amiodarone after procedure- looks like instructions are to take for 21 days, will have son address this question prior to next apt.      Patient Reported Findings:  Yes     No  [x]   []       Patient verifies current dosing regimen as listed- Son does medications --> spoke to son via telephone, verified correct dose  []   [x]       S/S bleeding/bruising/swelling/SOB- denies   []   [x]       Blood in urine or stool- denies   []   [x]       Procedures scheduled in the future at this time- denies   []   [x]       Missed Dose- son denies any missed doses --> pt denies   []   [x]       Extra Dose   [x]   []       Change in medications- son does medications, will have son look at list for next time and let know if there are changes, can see he is on amiodarone --> finished amiodarone on 6/24  --> resume lisinopril 20 mg daily and inc Crestor 40 mg   []   [x]       Change in health/diet/appetite- doesn't like to eat them often --> denies eating greens recently   []   [x]       Change in alcohol use- doesn't drink or smoke   []   [x]       Change in

## 2021-08-09 ENCOUNTER — OFFICE VISIT (OUTPATIENT)
Dept: FAMILY MEDICINE CLINIC | Age: 61
End: 2021-08-09
Payer: COMMERCIAL

## 2021-08-09 ENCOUNTER — TELEPHONE (OUTPATIENT)
Dept: FAMILY MEDICINE CLINIC | Age: 61
End: 2021-08-09

## 2021-08-09 VITALS
HEIGHT: 69 IN | DIASTOLIC BLOOD PRESSURE: 82 MMHG | WEIGHT: 182 LBS | SYSTOLIC BLOOD PRESSURE: 138 MMHG | OXYGEN SATURATION: 98 % | HEART RATE: 61 BPM | BODY MASS INDEX: 26.96 KG/M2

## 2021-08-09 DIAGNOSIS — I71.23 ANEURYSM OF DESCENDING THORACIC AORTA: ICD-10-CM

## 2021-08-09 DIAGNOSIS — E11.42 TYPE 2 DIABETES MELLITUS WITH DIABETIC POLYNEUROPATHY, WITHOUT LONG-TERM CURRENT USE OF INSULIN (HCC): Primary | ICD-10-CM

## 2021-08-09 DIAGNOSIS — Z95.1 S/P CABG X 4: ICD-10-CM

## 2021-08-09 DIAGNOSIS — I48.0 PAROXYSMAL ATRIAL FIBRILLATION (HCC): ICD-10-CM

## 2021-08-09 DIAGNOSIS — Z87.891 FORMER SMOKER: ICD-10-CM

## 2021-08-09 DIAGNOSIS — I10 ESSENTIAL HYPERTENSION: ICD-10-CM

## 2021-08-09 DIAGNOSIS — E78.00 HYPERCHOLESTEREMIA: ICD-10-CM

## 2021-08-09 PROCEDURE — 99214 OFFICE O/P EST MOD 30 MIN: CPT | Performed by: FAMILY MEDICINE

## 2021-08-09 PROCEDURE — 82044 UR ALBUMIN SEMIQUANTITATIVE: CPT | Performed by: FAMILY MEDICINE

## 2021-08-09 RX ORDER — GLUCOSAMINE HCL/CHONDROITIN SU 500-400 MG
CAPSULE ORAL
Qty: 100 STRIP | Refills: 3 | Status: SHIPPED | OUTPATIENT
Start: 2021-08-09 | End: 2021-08-09

## 2021-08-09 RX ORDER — LANCETS 30 GAUGE
EACH MISCELLANEOUS
Qty: 100 EACH | Refills: 3 | Status: SHIPPED | OUTPATIENT
Start: 2021-08-09

## 2021-08-09 RX ORDER — GLUCOSAMINE HCL/CHONDROITIN SU 500-400 MG
CAPSULE ORAL
Qty: 100 STRIP | Refills: 3 | Status: SHIPPED | OUTPATIENT
Start: 2021-08-09

## 2021-08-09 RX ORDER — BLOOD-GLUCOSE METER
1 KIT MISCELLANEOUS DAILY
Qty: 1 KIT | Refills: 0 | Status: SHIPPED | OUTPATIENT
Start: 2021-08-09

## 2021-08-09 SDOH — ECONOMIC STABILITY: FOOD INSECURITY: WITHIN THE PAST 12 MONTHS, THE FOOD YOU BOUGHT JUST DIDN'T LAST AND YOU DIDN'T HAVE MONEY TO GET MORE.: NEVER TRUE

## 2021-08-09 SDOH — ECONOMIC STABILITY: FOOD INSECURITY: WITHIN THE PAST 12 MONTHS, YOU WORRIED THAT YOUR FOOD WOULD RUN OUT BEFORE YOU GOT MONEY TO BUY MORE.: NEVER TRUE

## 2021-08-09 ASSESSMENT — SOCIAL DETERMINANTS OF HEALTH (SDOH): HOW HARD IS IT FOR YOU TO PAY FOR THE VERY BASICS LIKE FOOD, HOUSING, MEDICAL CARE, AND HEATING?: NOT HARD AT ALL

## 2021-08-09 NOTE — PROGRESS NOTES
Rosemary Francis is a 59 y.o. male. HPI:  Diabetes Mellitus Type II, Follow-up--   Lab Results   Component Value Date    LABA1C 6.7 05/13/2021      Checks sugars at home:No. patient does not test.  Last Eye Exam was in the past year. Pt is on ACEI or ARB. Pt denies visual disturbances. Is having some paresthesias of his feet. pt does adhere to a low carb diet. HTN--Pt seen here for follow up regarding HTN. BP checks at home:No    Pt denies blurred vision, chest pain, palpitations and peripheral edema. Pt complains of none. Tolerating medications: Yes. Pt does not exercise regularly and does adhere to a low salt diet. Hyperlipidemia:    Lab Results   Component Value Date    LDLCALC 113 (H) 05/22/2021   . He does not have myalgias from medication. Pt is  following Lifestyle modification including Low fat/low cholesterol diet, low carbohydrate diet, and does not exercise regularly. The patient underwent CABG X 4 and SANDRO on 5/25/2021 after angiogram found severe multivessel CAD. The patient's post-operative course was uneventful. Patient with urethral stricture that required urology to place arana catheter for surgery. He will need to follow-up with urology as an outpatient. Patient also has a 4.8cm AAA that will need follow-up with Dr. Linda West as an outpatient every 6 months. Patient had post-op atrial fibrillation and was anticoagulated on coumadin and started on amiodarone. Patient instructed to get a CXR prior to his visit with Dr. Pippa Bae. Pain was controlled with combination of oral and IV medications. Patient was able to ambulate without difficulty and tolerate a regular diet. The patient was discharged on  6/2/2021. C/o L lower back pain when gets up in the morning, better once moves around.      Current Outpatient Medications   Medication Sig Dispense Refill    rosuvastatin (CRESTOR) 40 MG tablet Take 1 tablet by mouth nightly 90 tablet 1    lisinopril (PRINIVIL;ZESTRIL) 20 MG tablet Take 1 tablet by mouth daily 90 tablet 0    amLODIPine (NORVASC) 5 MG tablet Take 1 tablet by mouth daily 30 tablet 0    traZODone (DESYREL) 50 MG tablet Take 1 tablet by mouth nightly as needed for Sleep 30 tablet 5    warfarin (COUMADIN) 1 MG tablet Take 3 tablets by mouth daily 60 tablet 1    acetaminophen (TYLENOL) 500 MG tablet Take 2 tablets by mouth every 6 hours 120 tablet 3    metoprolol tartrate 37.5 MG TABS Take 37.5 mg by mouth 2 times daily Hold for SBP <100. HR < 70 60 tablet 3    warfarin (COUMADIN) 2.5 MG tablet Take 1 tablet by mouth daily Take 2.5 mg tonight. [facility] to draw INR tomorrow and further dosing instructions after 30 tablet 3    ferrous sulfate (IRON 325) 325 (65 Fe) MG tablet Take 1 tablet by mouth daily (with breakfast) 30 tablet 5    tamsulosin (FLOMAX) 0.4 MG capsule TAKE TWO CAPSULES BY MOUTH DAILY 60 capsule 4    metFORMIN (GLUCOPHAGE) 1000 MG tablet Take 1 tablet by mouth 2 times daily (with meals) 180 tablet 3    aspirin 81 MG tablet Take 81 mg by mouth daily      traMADol (ULTRAM) 50 MG tablet Take 50 mg by mouth every 6 hours as needed.  pantoprazole (PROTONIX) 40 MG tablet Take 1 tablet by mouth daily 30 tablet 0     No current facility-administered medications for this visit.        Health Maintenance   Topic Date Due    Diabetic retinal exam  Never done    Shingles Vaccine (1 of 2) Never done    Low dose CT lung screening  11/28/2016    Diabetic foot exam  08/12/2021    Diabetic microalbuminuria test  08/12/2021    Flu vaccine (1) 09/01/2021    A1C test (Diabetic or Prediabetic)  05/13/2022    Lipid screen  05/22/2022    Potassium monitoring  06/02/2022    Creatinine monitoring  06/02/2022    Pneumococcal 0-64 years Vaccine (2 of 2 - PPSV23) 03/14/2023    DTaP/Tdap/Td vaccine (2 - Td or Tdap) 07/14/2027    Colon cancer screen colonoscopy  10/12/2027    COVID-19 Vaccine  Completed    Hepatitis A vaccine  Aged Out    Hib vaccine  Aged Out    Meningococcal (ACWY) vaccine  Aged Out    Hepatitis C screen  Discontinued    HIV screen  Discontinued       I have reviewed the patient's medical/surgical/family/social in detail and updated the computerized patient record as appropriate. Past Medical History:   Diagnosis Date    AAA (abdominal aortic aneurysm) ([facility] [address] 11/28/2015    4.3 cm 9/2019. yearly CT    Aneurysm of descending thoracic aorta ([address] 03/28/2018    2018: 3 cm    Arthritis     Atherosclerosis of aorta (HCC) 03/28/2018    extensive per CT 2018    Back pain     Diabetes mellitus ([address]     Diverticulosis     Elevated PSA 10/19/2017    Hyperlipidemia     Hypertension     Polyneuropathy associated with underlying disease ([address] 10/19/2017     Past Surgical History:   Procedure Laterality Date    CARDIAC CATHETERIZATION      Patient staes normal    CORONARY ARTERY BYPASS GRAFT N/A 5/25/2021    URGENT CABG CORONARY ARTERY BYPASS GRAFTING X 4, CISNEROS GRAFT TO LAD X 1, SEQUENTIAL VEIN GRAFT TO OM1 AND RPDA, VEIN GRAFT TO DIAGONAL WITH PROXIMAL ANASTIMOSIS END TO SIDE TO VEIN GRAFT TO OM1 AND RPDA, LIGATION SANDRO WITH 50 MM ATRICLIP, EVH LEFT LEG, TOTAL CPB, REJI, DOPPLER BYPASS GRAFT FLOW VERIFICATION, INSERTION OF VENTRICULAR PACING WIRES, BILATERAL INTERCOSTAL NERVE BLOCK WITH EXPAREL AND MODESTO    CYSTOSCOPY  5/25/2021    CYSTOSCOPY, URETHRAL DILATION, INSERTION OF URETHRAL CATHETER performed by Sabina Sarmietno MD at [address] [place] [address]  11/30/15    with gastric biopsy    UPPER GASTROINTESTINAL ENDOSCOPY N/A 10/12/2018    EGD BIOPSY performed by Maribell Sabillon MD at [address] [place], [place] [address]cortney Antunez  5/2017, 7/2017    Dr. Amparo Valdes     No family history on file.   Social History     Socioeconomic History    Marital status:      Spouse name: Not on file    Number of children: Not on file    Years of education: Not on file    Highest education level: Not on file   Occupational History    Not on file Tobacco Use    Smoking status: Former Smoker     Packs/day: 1.00     Years: 20.00     Pack years: 20.00     Types: Cigarettes     Quit date: 2009     Years since quittin.7    Smokeless tobacco: Never Used   Substance and Sexual Activity    Alcohol use: No    Drug use: No    Sexual activity: Not on file   Other Topics Concern    Not on file   Social History Narrative    Not on file     Social Determinants of Health     Financial Resource Strain: Low Risk     Difficulty of Paying Living Expenses: Not hard at all   Food Insecurity: No Food Insecurity    Worried About [facility] in the Last Year: Never true    [facility] in the Last Year: Never true   Transportation Needs:     Lack of Transportation (Medical):  Lack of Transportation (Non-Medical):    Physical Activity:     Days of Exercise per Week:     Minutes of Exercise per Session:    Stress:     Feeling of Stress :    Social Connections:     Frequency of Communication with Friends and Family:     Frequency of Social Gatherings with Friends and Family:     Attends Sikh Services:     Active Member of Clubs or Organizations:     Attends Club or Organization Meetings:     Marital Status:    Intimate Partner Violence:     Fear of Current or Ex-Partner:     Emotionally Abused:     Physically Abused:     Sexually Abused:          ROS:  Gen:  Denies fever, chills, headaches. HEENT:  Denies cold symptoms, sore throat. CV:  Denies chest pain or tightness, palpitations. Pulm:  Denies shortness of breath, cough. Abd:  Denies abdominal pain, change in bowel habits. I have reviewed the patient's medical history in detail and updated the computerized patient record as appropriate.      OBJECTIVE:  /82 (Site: Right Upper Arm, Position: Sitting, Cuff Size: Medium Adult)   Pulse 61   Ht 5' 9\" (1.753 m)   Wt 182 lb (82.6 kg)   SpO2 98%   BMI 26.88 kg/m²   GEN:  WDWN, NAD  HEENT:  NCAT, TM/OP nl, PERRL, EOMI.  NECK:  Supple without adenopathy. CV:  Regular rate and rhythm, S1 and S2 normal, no murmurs, clicks, gallops or rubs. No edema. PULM:  Chest is clear, no wheezing or rales. Normal symmetric air entry throughout both lung fields. ABD: soft, Non-tender, non-distended, normal bowel sounds. No organomegaly     ASSESSMENT/PLAN:  1. Type 2 diabetes mellitus with diabetic polyneuropathy, without long-term current use of insulin (HCC)  Has been stable  Check A1C  - Hemoglobin A1C; Future  -  DIABETES FOOT EXAM  - POCT microalbumin    2. Essential hypertension  stable  - CBC Auto Differential; Future  - Comprehensive Metabolic Panel; Future    3. Hypercholesteremia  Check lipids  Continue statin  - Lipid Panel; Future    4. Former smoker    5. Aneurysm of descending thoracic aorta (HCC)  F/u w Dr. Hai Holm in Nov 2021    6. Paroxysmal atrial fibrillation (HCC)  In NSR today  On anticoag. Follows w cardiology    7. S/P CABG x 4  Doing well  F/u w cardiology as scheduled      Discussed the importance of a low carb diet with regular cardiovascular exercise. Patient was given educational handouts regarding proper low carb/low calorie diet.

## 2021-08-10 ENCOUNTER — ANTI-COAG VISIT (OUTPATIENT)
Dept: PHARMACY | Age: 61
End: 2021-08-10
Payer: COMMERCIAL

## 2021-08-10 DIAGNOSIS — I48.91 ATRIAL FIBRILLATION, UNSPECIFIED TYPE (HCC): Primary | ICD-10-CM

## 2021-08-10 LAB — INTERNATIONAL NORMALIZATION RATIO, POC: 1.3

## 2021-08-10 PROCEDURE — 99212 OFFICE O/P EST SF 10 MIN: CPT

## 2021-08-10 PROCEDURE — 85610 PROTHROMBIN TIME: CPT

## 2021-08-10 NOTE — PROGRESS NOTES
Mr. Brian Grande is a 59 y.o. y/o male with history of Afib who presents today for anticoagulation monitoring and adjustment. Pertinent PMH: 59 y. o. male with significant past medical history of DM2, diverticulosis, HTN, HLD, AAA (4.3cm 9/2019, follows with Dr. Eveline Barreto), elevated PSA (urethral dilation, on flomax)  who presents for an outpatient left heart angiogram on 5/21 with chest pain. Patient had his annual PCP visit where he was found to be hypertensive and complained of sharp chest and bilateral arm pain. PCP referred him to cardiology for further work-up.  Angiogram found  severe multivessel coronary artery disease. CVTS was consulted for surgical evaluation for myocardial revascularization. Patient is a former smoker. Pt states he has been on warfarin since his surgery (developed afib) 5/25. Was being monitored by Dr. Alyssa Flores until now. Of note pt was also started on Amiodarone after procedure- looks like instructions are to take for 21 days, will have son address this question prior to next apt.      Patient Reported Findings:  Yes     No  [x]   []       Patient verifies current dosing regimen as listed- Son does medications --> spoke to son via telephone, verified correct dose---> confirmed dose   []   [x]       S/S bleeding/bruising/swelling/SOB- denies   []   [x]       Blood in urine or stool- denies   []   [x]       Procedures scheduled in the future at this time- denies   []   [x]       Missed Dose- son denies any missed doses --> pt denies---> states missed at least yesterday    []   [x]       Extra Dose   [x]   []       Change in medications- son does medications, will have son look at list for next time and let know if there are changes, can see he is on amiodarone --> finished amiodarone on 6/24  --> resume lisinopril 20 mg daily and inc Crestor 40 mg   []   [x]       Change in health/diet/appetite- doesn't like to eat them often --> denies eating greens recently---> eating greens (salads) several times/week    []   [x]       Change in alcohol use- doesn't drink or smoke   []   [x]       Change in activity  []   [x]       Hospital admission  []   [x]       Emergency department visit  []   [x]       Other complaints    Clinical Outcomes:  Yes     No  []   [x]       Major bleeding event  []   [x]       Thromboembolic event    Duration of warfarin Therapy: indefinite   INR Range:  2.0-3.0     Son does patient's medications- pt doesn't know anything about warfarin. Pt states son cannot attend apts dt working. Amiodarone dcd . INR today is 1.3 despite dose increase at last visit. Pt states missed yesterday. Unclear as to why INR remains so low despite dose adjustments as we have no established weekly dose for patient. Will increase again. Son did not answer phone call but pt will discuss with him and call if any concerns. Increase weekly dose to 4mg on Sun and Thur only and 5mg all other days. Encouraged to maintain a consistency of vegetables/salads.   Recheck INR again in 1 week,     Referring is Dr. Jessica Godoy   INR (no units)   Date Value   08/10/2021 1.3   2021 1.4   2021 1.3   2021 2.00   2021 1.50   2021 1.60   2021 2.30     For Pharmacy Admin Tracking Only     Intervention Detail: Adherence Monitorin and Dose Adjustment: 1, reason: Therapy Optimization   Total # of Interventions Recommended: 2   Total # of Interventions Accepted: 2   Time Spent (min): 15

## 2021-08-17 ENCOUNTER — ANTI-COAG VISIT (OUTPATIENT)
Dept: PHARMACY | Age: 61
End: 2021-08-17
Payer: COMMERCIAL

## 2021-08-17 DIAGNOSIS — I48.91 ATRIAL FIBRILLATION, UNSPECIFIED TYPE (HCC): Primary | ICD-10-CM

## 2021-08-17 LAB — INTERNATIONAL NORMALIZATION RATIO, POC: 1.8

## 2021-08-17 PROCEDURE — 85610 PROTHROMBIN TIME: CPT

## 2021-08-17 PROCEDURE — 99211 OFF/OP EST MAY X REQ PHY/QHP: CPT

## 2021-08-17 NOTE — PROGRESS NOTES
Mr. Katya Barlow is a 59 y.o. y/o male with history of Afib who presents today for anticoagulation monitoring and adjustment. Pertinent PMH: 59 y. o. male with significant past medical history of DM2, diverticulosis, HTN, HLD, AAA (4.3cm 9/2019, follows with Dr. Garry Fitzpatrick), elevated PSA (urethral dilation, on flomax)  who presents for an outpatient left heart angiogram on 5/21 with chest pain. Patient had his annual PCP visit where he was found to be hypertensive and complained of sharp chest and bilateral arm pain. PCP referred him to cardiology for further work-up.  Angiogram found  severe multivessel coronary artery disease. CVTS was consulted for surgical evaluation for myocardial revascularization. Patient is a former smoker. Pt states he has been on warfarin since his surgery (developed afib) 5/25. Was being monitored by Dr. Maryjo Cantu until now. Of note pt was also started on Amiodarone after procedure- looks like instructions are to take for 21 days, will have son address this question prior to next apt.      Patient Reported Findings:  Yes     No  [x]   []       Patient verifies current dosing regimen as listed- Son does medications --> spoke to son via telephone, verified correct dose---> confirmed dose   []   [x]       S/S bleeding/bruising/swelling/SOB- denies   []   [x]       Blood in urine or stool- denies   []   [x]       Procedures scheduled in the future at this time- denies   []   [x]       Missed Dose- denies   []   [x]       Extra Dose   []   [x]       Change in medications- son does medications, will have son look at list for next time and let know if there are changes, can see he is on amiodarone --> finished amiodarone on 6/24  --> resume lisinopril 20 mg daily and inc Crestor 40 mg --> no changes    [x]   []       Change in health/diet/appetite- doesn't like to eat them often --> denies eating greens recently---> eating greens (salads) several times/week --> has salads twice a week    []   [x] Change in alcohol use- doesn't drink or smoke   []   [x]       Change in activity  []   [x]       Hospital admission  []   [x]       Emergency department visit  []   [x]       Other complaints    Clinical Outcomes:  Yes     No  []   [x]       Major bleeding event  []   [x]       Thromboembolic event    Duration of warfarin Therapy: indefinite   INR Range:  2.0-3.0     Son does patient's medications- pt doesn't know anything about warfarin. Pt states son cannot attend apts dt working. INR today is 1.8 after continuing to increase weekly dose   Increase weekly dose to 5 mg daily (6% inc)  Encouraged to maintain a consistency of vegetables/salads.   Recheck INR again in 1 week, 8/24    Referring is Dr. Aleksandra Fernández   INR (no units)   Date Value   08/10/2021 1.3   08/03/2021 1.4   07/28/2021 1.3   07/21/2021 2.00   07/14/2021 1.50   07/08/2021 1.60   07/01/2021 2.30     For Pharmacy Admin Tracking Only     Intervention Detail: Dose Adjustment: 1, reason: Therapy Optimization   Total # of Interventions Recommended: 1   Total # of Interventions Accepted: 1   Time Spent (min): 15

## 2021-08-24 ENCOUNTER — ANTI-COAG VISIT (OUTPATIENT)
Dept: PHARMACY | Age: 61
End: 2021-08-24
Payer: COMMERCIAL

## 2021-08-24 DIAGNOSIS — I48.91 ATRIAL FIBRILLATION, UNSPECIFIED TYPE (HCC): Primary | ICD-10-CM

## 2021-08-24 LAB — INTERNATIONAL NORMALIZATION RATIO, POC: 1.6

## 2021-08-24 PROCEDURE — 85610 PROTHROMBIN TIME: CPT

## 2021-08-24 PROCEDURE — 99212 OFFICE O/P EST SF 10 MIN: CPT

## 2021-08-24 NOTE — PROGRESS NOTES
Mr. Tr Clancy is a 59 y.o. y/o male with history of Afib who presents today for anticoagulation monitoring and adjustment. Pertinent PMH: 59 y. o. male with significant past medical history of DM2, diverticulosis, HTN, HLD, AAA (4.3cm 9/2019, follows with Dr. Marylee Din), elevated PSA (urethral dilation, on flomax)  who presents for an outpatient left heart angiogram on 5/21 with chest pain. Patient had his annual PCP visit where he was found to be hypertensive and complained of sharp chest and bilateral arm pain. PCP referred him to cardiology for further work-up.  Angiogram found  severe multivessel coronary artery disease. CVTS was consulted for surgical evaluation for myocardial revascularization. Patient is a former smoker. Pt states he has been on warfarin since his surgery (developed afib) 5/25. Was being monitored by Dr. Toney Goodell until now. Of note pt was also started on Amiodarone after procedure- looks like instructions are to take for 21 days, will have son address this question prior to next apt.      Patient Reported Findings:  Yes     No  [x]   []       Patient verifies current dosing regimen as listed- Son does medications --> spoke to son via telephone, verified correct dose---> confirmed dose --> son follows AVS   []   [x]       S/S bleeding/bruising/swelling/SOB- denies   []   [x]       Blood in urine or stool- denies   []   [x]       Procedures scheduled in the future at this time- denies   []   [x]       Missed Dose- denies   []   [x]       Extra Dose denies   []   [x]       Change in medications- son does medications, will have son look at list for next time and let know if there are changes, can see he is on amiodarone --> finished amiodarone on 6/24  --> resume lisinopril 20 mg daily and inc Crestor 40 mg --> no changes    []   [x]       Change in health/diet/appetite- doesn't like to eat them often --> denies eating greens recently---> eating greens (salads) several times/week --> has salads twice a week --> no changes    []   [x]       Change in alcohol use- doesn't drink or smoke   []   [x]       Change in activity  []   [x]       Hospital admission  []   [x]       Emergency department visit  []   [x]       Other complaints    Clinical Outcomes:  Yes     No  []   [x]       Major bleeding event  []   [x]       Thromboembolic event    Duration of warfarin Therapy: indefinite   INR Range:  2.0-3.0     Son does patient's medications- pt doesn't know anything about warfarin. Pt states son cannot attend apts dt working. INR today is 1.6 despite increasing weekly dose in the last week   Unclear why INR will drop despite increasing dose   Increase weekly dose to 5 mg on Mon and Fri and 6 mg all other days of the week  (14% inc)  Encouraged to maintain a consistency of vegetables/salads. Called in new prescription for warfarin 5 mg tablets. Reviewed dosing multiple times explaining 2 different tablet strengths and how to take for the next week.  Son is still filling pillbox but pt understood instructions for warfarin   Recheck INR again in 1 week, 8/31    Referring is Dr. Jessica Godoy   INR (no units)   Date Value   08/17/2021 1.8   08/10/2021 1.3   08/03/2021 1.4   07/28/2021 1.3   07/21/2021 2.00   07/14/2021 1.50   07/08/2021 1.60   07/01/2021 2.30     For Pharmacy Admin Tracking Only     Intervention Detail: Dose Adjustment: 1, reason: Therapy Optimization and Refill(s) Provided   Total # of Interventions Recommended: 2   Total # of Interventions Accepted: 2   Time Spent (min): 15

## 2021-08-24 NOTE — TELEPHONE ENCOUNTER
Warfarin prescription phoned into [facility] to [address] under [address]. Jessica Godoy  Warfarin 5 mg tabs  Take 5 mg daily   90 days   2 refills

## 2021-08-31 ENCOUNTER — ANTI-COAG VISIT (OUTPATIENT)
Dept: PHARMACY | Age: 61
End: 2021-08-31
Payer: COMMERCIAL

## 2021-08-31 ENCOUNTER — OFFICE VISIT (OUTPATIENT)
Dept: CARDIOLOGY CLINIC | Age: 61
End: 2021-08-31
Payer: COMMERCIAL

## 2021-08-31 VITALS
WEIGHT: 182.8 LBS | DIASTOLIC BLOOD PRESSURE: 82 MMHG | BODY MASS INDEX: 27.08 KG/M2 | OXYGEN SATURATION: 97 % | HEART RATE: 76 BPM | SYSTOLIC BLOOD PRESSURE: 162 MMHG | HEIGHT: 69 IN

## 2021-08-31 DIAGNOSIS — I10 ESSENTIAL HYPERTENSION: ICD-10-CM

## 2021-08-31 DIAGNOSIS — E78.2 MIXED HYPERLIPIDEMIA: ICD-10-CM

## 2021-08-31 DIAGNOSIS — I48.21 PERMANENT ATRIAL FIBRILLATION (HCC): Primary | ICD-10-CM

## 2021-08-31 DIAGNOSIS — I25.10 CORONARY ARTERY DISEASE INVOLVING NATIVE CORONARY ARTERY OF NATIVE HEART WITHOUT ANGINA PECTORIS: Primary | ICD-10-CM

## 2021-08-31 DIAGNOSIS — I48.0 PAROXYSMAL ATRIAL FIBRILLATION (HCC): ICD-10-CM

## 2021-08-31 LAB — INTERNATIONAL NORMALIZATION RATIO, POC: 2.3

## 2021-08-31 PROCEDURE — 85610 PROTHROMBIN TIME: CPT

## 2021-08-31 PROCEDURE — 99214 OFFICE O/P EST MOD 30 MIN: CPT | Performed by: NURSE PRACTITIONER

## 2021-08-31 PROCEDURE — 99211 OFF/OP EST MAY X REQ PHY/QHP: CPT

## 2021-08-31 RX ORDER — METOPROLOL TARTRATE 37.5 MG/1
37.5 TABLET, FILM COATED ORAL 2 TIMES DAILY
Qty: 180 TABLET | Refills: 3 | Status: SHIPPED | OUTPATIENT
Start: 2021-08-31 | End: 2021-10-07 | Stop reason: SDUPTHER

## 2021-08-31 RX ORDER — AMLODIPINE BESYLATE 5 MG/1
5 TABLET ORAL DAILY
Qty: 90 TABLET | Refills: 3 | Status: SHIPPED | OUTPATIENT
Start: 2021-08-31 | End: 2022-03-14

## 2021-08-31 RX ORDER — ROSUVASTATIN CALCIUM 40 MG/1
40 TABLET, COATED ORAL NIGHTLY
Qty: 90 TABLET | Refills: 3 | Status: SHIPPED | OUTPATIENT
Start: 2021-08-31 | End: 2022-10-03 | Stop reason: SDUPTHER

## 2021-08-31 NOTE — PROGRESS NOTES
Mr. Brian Grande is a 59 y.o. y/o male with history of Afib who presents today for anticoagulation monitoring and adjustment. Pertinent PMH: 59 y. o. male with significant past medical history of DM2, diverticulosis, HTN, HLD, AAA (4.3cm 9/2019, follows with Dr. Eveline Barreto), elevated PSA (urethral dilation, on flomax)  who presents for an outpatient left heart angiogram on 5/21 with chest pain. Patient had his annual PCP visit where he was found to be hypertensive and complained of sharp chest and bilateral arm pain. PCP referred him to cardiology for further work-up.  Angiogram found  severe multivessel coronary artery disease. CVTS was consulted for surgical evaluation for myocardial revascularization. Patient is a former smoker. Pt states he has been on warfarin since his surgery (developed afib) 5/25. Was being monitored by Dr. Alyssa Flores until now. Of note pt was also started on Amiodarone after procedure- looks like instructions are to take for 21 days, will have son address this question prior to next apt.      Patient Reported Findings:  Yes     No  [x]   []       Patient verifies current dosing regimen as listed- Son does medications --> spoke to son via telephone, verified correct dose---> confirmed dose --> son follows AVS   []   [x]       S/S bleeding/bruising/swelling/SOB- denies   []   [x]       Blood in urine or stool- denies   []   [x]       Procedures scheduled in the future at this time- denies   []   [x]       Missed Dose- denies   []   [x]       Extra Dose denies   []   [x]       Change in medications- son does medications, will have son look at list for next time and let know if there are changes, can see he is on amiodarone --> finished amiodarone on 6/24  --> resume lisinopril 20 mg daily and inc Crestor 40 mg --> no changes    []   [x]       Change in health/diet/appetite- doesn't like to eat them often --> denies eating greens recently---> eating greens (salads) several times/week --> has salads twice a week --> no changes, no NVD    []   [x]       Change in alcohol use- doesn't drink or smoke   []   [x]       Change in activity  []   [x]       Hospital admission  []   [x]       Emergency department visit  []   [x]       Other complaints    Clinical Outcomes:  Yes     No  []   [x]       Major bleeding event  []   [x]       Thromboembolic event    Duration of warfarin Therapy: indefinite   INR Range:  2.0-3.0     Son does patient's medications- pt doesn't know anything about warfarin. Pt states son cannot attend apts dt working. INR today is 2.3  Unclear why INR was dropping despite increasing dose every time, dose increased again at last visit. Continue taking dose of 5 mg on Mon and Fri and 6 mg all other days of the week   Encouraged to maintain a consistency of vegetables/salads. Called in new prescription for warfarin 5 mg tablets.    Recheck INR again in 1 week, 9/8    Referring is Dr. Escobar Suarez   INR (no units)   Date Value   08/24/2021 1.6   08/17/2021 1.8   08/10/2021 1.3   08/03/2021 1.4   07/21/2021 2.00   07/14/2021 1.50   07/08/2021 1.60   07/01/2021 2.30     For Pharmacy Admin Tracking Only     Intervention Detail: Dose Adjustment: 1, reason: Therapy Optimization and Refill(s) Provided   Total # of Interventions Recommended: 2   Total # of Interventions Accepted: 2   Time Spent (min): 15

## 2021-08-31 NOTE — PROGRESS NOTES
[address]     Outpatient Follow Up Note    Everardo Galicia is 59 y.o. male who presents today with a history of CAD s/p CABG with SANDRO ligation May '21 with post-op AF, HTN and hyperlipidemia. Her other hx includes: AAA 4.3 cm followed by Dr. Michelle Goff / HPI:  Follow Up secondary to coronary artery disease    Subjective:   He takes tylenol as needed for Lt upper chest wall pain. He has a numbness in his chest. Picking up then throwing out garbage hurts and trying to vacuum hurts. Overall its getting a little bit better    There is no SOB/SAL. The patient denies orthopnea/PND. The patient does not have swelling. The patients weight is stable . The patient is not experiencing palpitations or dizziness. He feels tired at times  His home BP is up/down : 135/80 up to 150/     These symptoms are improving since the last OV. With regard to medication therapy the patient has been compliant with prescribed regimen : however, he's been off norvasc for 3 weeks after his prescription  and did not get crestor or metoprolol refilled. They have tolerated therapy to date.      Past Medical History:   Diagnosis Date    AAA (abdominal aortic aneurysm) ([address] 2015    4.3 cm 2019. yearly CT    Aneurysm of descending thoracic aorta ([address] 2018    2018: 3 cm    Arthritis     Atherosclerosis of aorta ([address] 2018    extensive per CT 2018    Back pain     Coronary artery disease involving native coronary artery of native heart with unstable angina pectoris (HCC)     Diabetes mellitus ([address]     Diverticulosis     Elevated PSA 10/19/2017    Hyperlipidemia     Hypertension     Polyneuropathy associated with underlying disease ([address] 10/19/2017     Social History:    Social History     Tobacco Use   Smoking Status Former Smoker    Packs/day: 1.00    Years: 20.00    Pack years: 20.00    Types: Cigarettes    Quit date: 2009    Years since quittin.7   Smokeless Tobacco depression    Objective:   PHYSICAL EXAM:       Vitals:    08/31/21 1409 08/31/21 1440   BP: (!) 162/80 (!) 162/82   Site:  Right Upper Arm   Cuff Size:  Medium Adult   Pulse: 76    SpO2: 97%    Weight: 182 lb 12.8 oz (82.9 kg)    Height: 5' 9\" (1.753 m)        VITALS:  BP (!) 162/82 (Site: Right Upper Arm, Cuff Size: Medium Adult)   Pulse 76   Ht 5' 9\" (1.753 m)   Wt 182 lb 12.8 oz (82.9 kg)   SpO2 97%   BMI 26.99 kg/m²   CONSTITUTIONAL: Cooperative, no apparent distress, and appears well nourished / developed  NEUROLOGIC:  Awake and orientated to person, place and time. PSYCH: Calm affect. SKIN: Warm and dry. HEENT: Sclera non-icteric, normocephalic, neck supple, no elevation of JVP, normal carotid pulses with no bruits and thyroid normal size. LUNGS:  No increased work of breathing and clear to auscultation, no crackles or wheezing  CARDIOVASCULAR:  Regular rate and rhythm with no murmurs, gallops, rubs, or abnormal heart sounds, normal PMI. The apical impulses not displaced  JVP less than 8 cm H2O  Heart tones are crisp and normal  Cervical veins are not engorged  The carotid upstroke is normal in amplitude and contour without delay or bruit  JVP is not elevated  ABDOMEN:  Normal bowel sounds, non-distended and non-tender to palpation  EXT: No edema, no calf tenderness. Pulses are present bilaterally.     DATA:    Lab Results   Component Value Date    ALT 12 05/13/2021    AST 14 (L) 05/13/2021    ALKPHOS 61 05/13/2021    BILITOT <0.2 05/13/2021     Lab Results   Component Value Date    CREATININE 1.1 06/02/2021    BUN 28 (H) 06/02/2021     06/02/2021    K 4.0 06/02/2021    CL 96 (L) 06/02/2021    CO2 29 06/02/2021     Lab Results   Component Value Date    TSH 1.360 04/29/2019     Lab Results   Component Value Date    WBC 10.2 06/02/2021    HGB 8.7 (L) 06/02/2021    HCT 27.5 (L) 06/02/2021    MCV 74.6 (L) 06/02/2021     06/02/2021     No components found for: CHLPL  Lab Results   Component Value Date    TRIG 109 05/22/2021    TRIG 85 05/13/2021    TRIG 130 08/13/2020     Lab Results   Component Value Date    HDL 42 05/22/2021    HDL 52 05/13/2021    HDL 50 08/13/2020     Lab Results   Component Value Date    LDLCALC 113 (H) 05/22/2021    LDLCALC 163 (H) 05/13/2021    LDLCALC 185 (H) 08/13/2020     Lab Results   Component Value Date    LABVLDL 22 05/22/2021    LABVLDL 17 05/13/2021    LABVLDL 26 08/13/2020     Lab Results   Component Value Date    INR 2.3 08/31/2021    INR 1.6 08/24/2021    INR 1.8 08/17/2021    PROTIME 14.2 (H) 06/02/2021    PROTIME 13.7 (H) 06/01/2021    PROTIME 15.2 (H) 05/25/2021     Radiology Review:  Pertinent images / reports were reviewed as a part of this visit and reveals the following:    Last Echo: 5/22/21  Summary   Overall left ventricular function is normal.   Ejection fraction is visually estimated to be 60 %. E/e'= 10.0   There is mild concentric left ventricular   The right ventricle is mildly enlarged.   Normal right ventricular size and function.   Mild to moderate tricuspid regurgitation.   Estimated pulmonary artery systolic pressure is mildly to moderately   elevated at 35 mmHg assuming a right atrial pressure of 3 mmHg.   Moderate pulmonic regurgitation present.   The pulmonic valve is structurally normal.     Procedure:  5/25/2021  URGENT CABG CORONARY ARTERY BYPASS GRAFTING X 4, CISNEROS GRAFT TO LAD X 1, SEQUENTIAL VEIN GRAFT TO OM1 AND RPDA, VEIN GRAFT TO DIAGONAL WITH PROXIMAL ANASTIMOSIS END TO SIDE TO VEIN GRAFT TO OM1 AND RPDA, LIGATION SANDRO WITH 50 MM ATRICLIP    Assessment:      Diagnosis Orders   1. Coronary artery disease involving native coronary artery of native heart without angina pectoris   ~stable : continues to c/o chest wall and surgical discomfort. ~s/p CABG 5/28/21  ~normal LVEF  ~ASA : taking . Off BB/statin    2.  Essential hypertension   ~elevated   ~has been off amlodipine and metoprolol for approx 3 weeks ; did not think to get refilled 3. Mixed hyperlipidemia   ~LDL not at goal with profile from May  ~he stopped taking crestor    4. Paroxysmal atrial fibrillation (HCC)   ~AP regular  ~s/p SANDRO ligation   ~pt had stopped taking BB  ~warfarin managed in coumadin clinic; INR therapeutic       I had the opportunity to review the clinical symptoms and presentation of Radha Ross. Plan:     1. Resume norvasc 5 mg daily    Resume metoprolol 37.5 mg bid   Resume crestor 40 mg daily   2. released to RTW on 9/13  3. F/U in 8-10 weeks    Overall the patient is stable from CV standpoint    I have addresed the patient's cardiac risk factors and adjusted pharmacologic treatment as needed. In addition, I have reinforced the need for patient directed risk factor modification. Further evaluation will be based upon the patient's clinical course and testing results. All questions and concerns were addressed to the patient. Alternatives to my treatment were discussed. The patient is not currently smoking. The risks related to smoking were reviewed with the patient. Recommend maintaining a smoke-free lifestyle. Patient is on a beta-blocker  Patient is on an ace-i/ARB  Patient is on a statin    Antiplatelet therapy / anti-coagulation has been recommended / prescribed for this patient. Education conducted on adverse reactions including bleeding was discussed. Angiotension inhibitor/angiotension receptor blocker has __ been prescribed / recommended for congestive heart failure. Daily weight, low sodium diet were discussed. Patient instructed to call the office with a weight gain: > 3 # over night or 5# in one week; swelling, SOB/orthopnea/PND    The patient verbalizes understanding not to stop medications without discussing with us. Discussed exercise: 30-60 minutes 7 days/week  Discussed Low saturated fat/ARSH diet. Thank you for allowing to us to participate in the care of Radha Ross.     Alan Sender, APRN    Documentation of today's visit sent to PCP

## 2021-09-02 DIAGNOSIS — E11.42 TYPE 2 DIABETES MELLITUS WITH DIABETIC POLYNEUROPATHY, WITHOUT LONG-TERM CURRENT USE OF INSULIN (HCC): ICD-10-CM

## 2021-09-03 NOTE — TELEPHONE ENCOUNTER
Medication:   Requested Prescriptions     Pending Prescriptions Disp Refills    metFORMIN (GLUCOPHAGE) 1000 MG tablet [Pharmacy Med Name: metFORMIN HCL 1,000 MG TABLET] 60 tablet      Sig: TAKE ONE TABLET BY MOUTH TWICE A DAY WITH A MEAL        Last Filled:  8/20/20 #180 R3    Patient Phone Number: [phone] (home) [phone] (work)    Last appt: 8/9/2021   Next appt: 11/10/2021    Last OARRS: No flowsheet data found.

## 2021-09-08 ENCOUNTER — ANTI-COAG VISIT (OUTPATIENT)
Dept: PHARMACY | Age: 61
End: 2021-09-08
Payer: COMMERCIAL

## 2021-09-08 DIAGNOSIS — I48.91 ATRIAL FIBRILLATION, UNSPECIFIED TYPE (HCC): Primary | ICD-10-CM

## 2021-09-08 LAB — INTERNATIONAL NORMALIZATION RATIO, POC: 3

## 2021-09-08 PROCEDURE — 99211 OFF/OP EST MAY X REQ PHY/QHP: CPT

## 2021-09-08 PROCEDURE — 85610 PROTHROMBIN TIME: CPT

## 2021-09-08 NOTE — PROGRESS NOTES
Mr. Rj Anand is a 59 y.o. y/o male with history of Afib who presents today for anticoagulation monitoring and adjustment. Pertinent PMH: 59 y. o. male with significant past medical history of DM2, diverticulosis, HTN, HLD, AAA (4.3cm 9/2019, follows with Dr. Liz Freeman), elevated PSA (urethral dilation, on flomax)  who presents for an outpatient left heart angiogram on 5/21 with chest pain. Patient had his annual PCP visit where he was found to be hypertensive and complained of sharp chest and bilateral arm pain. PCP referred him to cardiology for further work-up.  Angiogram found  severe multivessel coronary artery disease. CVTS was consulted for surgical evaluation for myocardial revascularization. Patient is a former smoker. Pt states he has been on warfarin since his surgery (developed afib) 5/25. Was being monitored by Dr. Olga Lidia Mcrae until now. Of note pt was also started on Amiodarone after procedure- looks like instructions are to take for 21 days, will have son address this question prior to next apt.      Patient Reported Findings:  Yes     No  [x]   []       Patient verifies current dosing regimen as listed- Son does medications --> spoke to son via telephone, verified correct dose---> confirmed dose --> son follows AVS   []   [x]       S/S bleeding/bruising/swelling/SOB- denies   []   [x]       Blood in urine or stool- denies   []   [x]       Procedures scheduled in the future at this time- denies   []   [x]       Missed Dose- denies   []   [x]       Extra Dose denies   [x]   []       Change in medications- son does medications, will have son look at list for next time and let know if there are changes, can see he is on amiodarone --> finished amiodarone on 6/24  --> resume lisinopril 20 mg daily and inc Crestor 40 mg --> resumed amlodipine and metoprolol    [x]   []       Change in health/diet/appetite- doesn't like to eat them often --> denies eating greens recently---> eating greens (salads) several times/week --> has salads twice a week --> no salads this week   []   [x]       Change in alcohol use- doesn't drink or smoke   []   [x]       Change in activity  []   [x]       Hospital admission  []   [x]       Emergency department visit  []   [x]       Other complaints    Clinical Outcomes:  Yes     No  []   [x]       Major bleeding event  []   [x]       Thromboembolic event    Duration of warfarin Therapy: indefinite   INR Range:  2.0-3.0     Son does patient's medications- pt doesn't know anything about warfarin. Pt states son cannot attend apts dt working. INR today is 3  Pt plans to return to salads twice a week   Continue taking dose of 5 mg on Mon and Fri and 6 mg all other days of the week   Encouraged to maintain a consistency of vegetables/salads.   Recheck INR again in 10 days, 9/20    Referring is Dr. Thomas Blevins   INR (no units)   Date Value   08/31/2021 2.3   08/24/2021 1.6   08/17/2021 1.8   08/10/2021 1.3   07/21/2021 2.00   07/14/2021 1.50   07/08/2021 1.60   07/01/2021 2.30     For Pharmacy Admin Tracking Only     Total # of Interventions Recommended: 0   Total # of Interventions Accepted: 0   Time Spent (min): 15

## 2021-09-20 ENCOUNTER — ANTI-COAG VISIT (OUTPATIENT)
Dept: PHARMACY | Age: 61
End: 2021-09-20
Payer: COMMERCIAL

## 2021-09-20 DIAGNOSIS — I48.91 ATRIAL FIBRILLATION, UNSPECIFIED TYPE (HCC): Primary | ICD-10-CM

## 2021-09-20 LAB — INTERNATIONAL NORMALIZATION RATIO, POC: 2.9

## 2021-09-20 PROCEDURE — 85610 PROTHROMBIN TIME: CPT

## 2021-09-20 PROCEDURE — 99211 OFF/OP EST MAY X REQ PHY/QHP: CPT

## 2021-09-20 NOTE — PROGRESS NOTES
Mr. Maira Weir is a 59 y.o. y/o male with history of Afib who presents today for anticoagulation monitoring and adjustment. Pertinent PMH: 59 y. o. male with significant past medical history of DM2, diverticulosis, HTN, HLD, AAA (4.3cm 9/2019, follows with Dr. Eamon Jiménez), elevated PSA (urethral dilation, on flomax)  who presents for an outpatient left heart angiogram on 5/21 with chest pain. Patient had his annual PCP visit where he was found to be hypertensive and complained of sharp chest and bilateral arm pain. PCP referred him to cardiology for further work-up.  Angiogram found  severe multivessel coronary artery disease. CVTS was consulted for surgical evaluation for myocardial revascularization. Patient is a former smoker. Pt states he has been on warfarin since his surgery (developed afib) 5/25. Was being monitored by Dr. Coit Castleman until now. Of note pt was also started on Amiodarone after procedure- looks like instructions are to take for 21 days, will have son address this question prior to next apt.      Patient Reported Findings:  Yes     No  [x]   []       Patient verifies current dosing regimen as listed- Son does medications --> spoke to son via telephone, verified correct dose---> confirmed dose --> son follows AVS   []   [x]       S/S bleeding/bruising/swelling/SOB- denies   []   [x]       Blood in urine or stool- denies   []   [x]       Procedures scheduled in the future at this time- denies   []   [x]       Missed Dose- denies   []   [x]       Extra Dose denies   []   [x]       Change in medications- son does medications, will have son look at list for next time and let know if there are changes, can see he is on amiodarone --> finished amiodarone on 6/24  --> resume lisinopril 20 mg daily and inc Crestor 40 mg --> resumed amlodipine and metoprolol --> no changes    [x]   []       Change in health/diet/appetite- doesn't like to eat them often --> denies eating greens recently---> eating greens (salads) several times/week --> has salads twice a week --> no salads this week --> returned to salads a few times a week   []   [x]       Change in alcohol use- doesn't drink or smoke   []   [x]       Change in activity  []   [x]       Hospital admission  []   [x]       Emergency department visit  []   [x]       Other complaints    Clinical Outcomes:  Yes     No  []   [x]       Major bleeding event  []   [x]       Thromboembolic event    Duration of warfarin Therapy: indefinite   INR Range:  2.0-3.0     Son does patient's medications- pt doesn't know anything about warfarin. Pt states son cannot attend apts dt working. INR today is 2.9  Continue taking dose of 5 mg on Mon and Fri and 6 mg all other days of the week   Encouraged to maintain a consistency of vegetables/salads.   Reviewed why have to check INR while on warfarin and why taking warfarin   Recheck INR again in 2 weeks, 10/4    Referring is Dr. Aris Guevara   INR (no units)   Date Value   09/08/2021 3   08/31/2021 2.3   08/24/2021 1.6   08/17/2021 1.8   07/21/2021 2.00   07/14/2021 1.50   07/08/2021 1.60   07/01/2021 2.30     For Pharmacy Admin Tracking Only     Total # of Interventions Recommended: 0   Total # of Interventions Accepted: 0   Time Spent (min): 15

## 2021-10-04 ENCOUNTER — TELEPHONE (OUTPATIENT)
Dept: PHARMACY | Age: 61
End: 2021-10-04

## 2021-10-04 ENCOUNTER — ANTI-COAG VISIT (OUTPATIENT)
Dept: PHARMACY | Age: 61
End: 2021-10-04
Payer: COMMERCIAL

## 2021-10-04 ENCOUNTER — TELEPHONE (OUTPATIENT)
Dept: CARDIOLOGY CLINIC | Age: 61
End: 2021-10-04

## 2021-10-04 DIAGNOSIS — I48.91 ATRIAL FIBRILLATION, UNSPECIFIED TYPE (HCC): Primary | ICD-10-CM

## 2021-10-04 DIAGNOSIS — I48.0 PAROXYSMAL ATRIAL FIBRILLATION (HCC): Primary | ICD-10-CM

## 2021-10-04 LAB — INTERNATIONAL NORMALIZATION RATIO, POC: 2.2

## 2021-10-04 PROCEDURE — 99212 OFFICE O/P EST SF 10 MIN: CPT

## 2021-10-04 PROCEDURE — 85610 PROTHROMBIN TIME: CPT

## 2021-10-04 NOTE — TELEPHONE ENCOUNTER
Marlon came into the Coumadin Clinic today, and was tested at 2.2. He proceeded to tell everyone he is done testing, and will not be taking the Coumadin. Stated he does not have time for this.

## 2021-10-04 NOTE — TELEPHONE ENCOUNTER
Called Cardio to let Dr. Gerald Ashford know that patient is refusing to RTC, states that he is fine, his INRs are perfect and he will not come back and that when he runs out of warfarin then he will just stop taking it and that he will call cardiology and let them know and that if it rings and no one answers he won't be calling them back. Son does medications and asked if he talked to his son about this and he said \"No but I will be letting him know tonight\". Called cardio to let them know what was going on.     Jaiden Puentes, PharmD, [facility]

## 2021-10-04 NOTE — PROGRESS NOTES
(salads) several times/week --> has salads twice a week --> no salads this week --> returned to salads a few times a week   []   [x]       Change in alcohol use- doesn't drink or smoke   []   [x]       Change in activity  []   [x]       Hospital admission  []   [x]       Emergency department visit  []   [x]       Other complaints    Clinical Outcomes:  Yes     No  []   [x]       Major bleeding event  []   [x]       Thromboembolic event    Duration of warfarin Therapy: indefinite   INR Range:  2.0-3.0     Son does patient's medications- pt doesn't know anything about warfarin. Pt states son cannot attend apts dt working. INR today is 2.2   Continue taking dose of 5 mg on Mon and Fri and 6 mg all other days of the week. Encouraged to maintain a consistency of vegetables/salads. Patient refused to schedule another apt. States he is fine and doesn't need to be monitored. Refused to take warfarin after RF runs out. States that he will call Dr. Ethelda Bence but if it rings he will not be speaking to them again. States that he will inform son tonight. Called cardio to inform them of this and that we will be discharging patient per wishes.      Referring is Dr. Ethelda Bence   INR (no units)   Date Value   09/20/2021 2.9   09/08/2021 3   08/31/2021 2.3   08/24/2021 1.6   07/21/2021 2.00   07/14/2021 1.50   07/08/2021 1.60   07/01/2021 2.30     For Pharmacy Admin Tracking Only     Total # of Interventions Recommended: 0   Total # of Interventions Accepted: 0   Time Spent (min): 15

## 2021-10-05 NOTE — TELEPHONE ENCOUNTER
Per DCE recommend 30 day MCOT to assess for afib and to see EP in 6 weeks for definitive decision regarding long term Coumadin. There is a hold spot with Dr. Leyla Grande 11/16/21 he can use, or there is other availability in November with Dr. Leyla Grande for a new patient appointment. MCOT ordered. He also needs to get back in and schedule Coumadin Clinic appointment. Please ask him to call.

## 2021-10-05 NOTE — TELEPHONE ENCOUNTER
Called and spoke with patient and informed him of message below. Appt scheduled a Npv with Dr Armando Cohn. Upon reading out Coumadin Clinic phone number. offered appt for MCOT. He was not interested in scheduling at this time. He will call the office back to schedule. He was informed that he will need to have this completed before his appt on 11/16/2021.

## 2021-10-05 NOTE — TELEPHONE ENCOUNTER
Appears he is on Coumadin for PAF post op CABG 5/2021  Has been on Coumadin since    Mr. Johnson Channing concerned he will \"have to be on Coumadin for his whole life\" and \"that he feels he doesn't need it. \" We discussed reasoning for recommendations on continuing Coumadin. Will discuss recommendations for long term Coumadin with DCE.

## 2021-10-05 NOTE — TELEPHONE ENCOUNTER
Patient called and stated that doctor said he has to come in. Made apt for 10/12 per pt request. States he will call when he gets home and let me know if he needs a RF.     Adri Scruggs, PebblesD, [facility]

## 2021-10-06 DIAGNOSIS — R35.1 NOCTURIA: ICD-10-CM

## 2021-10-06 RX ORDER — TAMSULOSIN HYDROCHLORIDE 0.4 MG/1
CAPSULE ORAL
Qty: 60 CAPSULE | Refills: 4 | Status: SHIPPED | OUTPATIENT
Start: 2021-10-06 | End: 2022-03-07 | Stop reason: SDUPTHER

## 2021-10-06 NOTE — TELEPHONE ENCOUNTER
Medication:   Requested Prescriptions     Pending Prescriptions Disp Refills    tamsulosin (FLOMAX) 0.4 MG capsule [Pharmacy Med Name: TAMSULOSIN HCL 0.4 MG CAPSULE] 60 capsule 4     Sig: TAKE TWO CAPSULES BY MOUTH DAILY     Last Filled:  02/23/21    Last appt: 8/9/2021   Next appt: 11/10/2021    Last OARRS: No flowsheet data found.

## 2021-10-07 RX ORDER — METOPROLOL TARTRATE 37.5 MG/1
37.5 TABLET, FILM COATED ORAL 2 TIMES DAILY
Qty: 180 TABLET | Refills: 3 | Status: SHIPPED | OUTPATIENT
Start: 2021-10-07 | End: 2022-03-14

## 2021-10-07 NOTE — TELEPHONE ENCOUNTER
Last OV 8/9/2021   Next OV 11/10/2021    Requested Prescriptions     Pending Prescriptions Disp Refills    Metoprolol Tartrate 37.5 MG TABS 180 tablet 3     Sig: Take 37.5 mg by mouth 2 times daily    last feel 8/31  pended

## 2021-10-07 NOTE — TELEPHONE ENCOUNTER
Medication and Quantity requested:     Metoprolol Tartrate 37.5 MG TABS     Quantity: 180    Last Visit 8/9/21    Pharmacy and phone number yes Odalis Rayo

## 2021-10-18 ENCOUNTER — ANTI-COAG VISIT (OUTPATIENT)
Dept: PHARMACY | Age: 61
End: 2021-10-18
Payer: COMMERCIAL

## 2021-10-18 DIAGNOSIS — I48.91 ATRIAL FIBRILLATION, UNSPECIFIED TYPE (HCC): Primary | ICD-10-CM

## 2021-10-18 LAB — INTERNATIONAL NORMALIZATION RATIO, POC: 2.4

## 2021-10-18 PROCEDURE — 99211 OFF/OP EST MAY X REQ PHY/QHP: CPT

## 2021-10-18 PROCEDURE — 85610 PROTHROMBIN TIME: CPT

## 2021-10-18 NOTE — PROGRESS NOTES
Mr. Chico Milian is a 59 y.o. y/o male with history of Afib who presents today for anticoagulation monitoring and adjustment. Pertinent PMH: 59 y. o. male with significant past medical history of DM2, diverticulosis, HTN, HLD, AAA (4.3cm 9/2019, follows with Dr. Namita Roa), elevated PSA (urethral dilation, on flomax)  who presents for an outpatient left heart angiogram on 5/21 with chest pain. Patient had his annual PCP visit where he was found to be hypertensive and complained of sharp chest and bilateral arm pain. PCP referred him to cardiology for further work-up.  Angiogram found  severe multivessel coronary artery disease. CVTS was consulted for surgical evaluation for myocardial revascularization. Patient is a former smoker. Pt states he has been on warfarin since his surgery (developed afib) 5/25. Was being monitored by Dr. Sera Reyes until now. Of note pt was also started on Amiodarone after procedure- looks like instructions are to take for 21 days, will have son address this question prior to next apt.      Patient Reported Findings:  Yes     No  [x]   []       Patient verifies current dosing regimen as listed- Son does medications --> spoke to son via telephone, verified correct dose---> confirmed dose --> son follows AVS --> patient states that he uses paperwork to fill pillboxes   []   [x]       S/S bleeding/bruising/swelling/SOB- denies   []   [x]       Blood in urine or stool- denies   []   [x]       Procedures scheduled in the future at this time- denies   []   [x]       Missed Dose- denies   []   [x]       Extra Dose denies   []   [x]       Change in medications- son does medications, will have son look at list for next time and let know if there are changes, can see he is on amiodarone --> finished amiodarone on 6/24  --> resume lisinopril 20 mg daily and inc Crestor 40 mg --> resumed amlodipine and metoprolol --> no changes    []   [x]       Change in health/diet/appetite- doesn't like to eat them often --> denies eating greens recently---> eating greens (salads) several times/week --> has salads twice a week --> no salads this week --> returned to salads a few times a week --> no changes   []   [x]       Change in alcohol use- doesn't drink or smoke   []   [x]       Change in activity  []   [x]       Hospital admission  []   [x]       Emergency department visit  []   [x]       Other complaints    Clinical Outcomes:  Yes     No  []   [x]       Major bleeding event  []   [x]       Thromboembolic event    Duration of warfarin Therapy: indefinite   INR Range:  2.0-3.0     Son does patient's medications- pt doesn't know anything about warfarin. Pt states son cannot attend apts dt working. INR today is 2.4   Continue taking dose of 5 mg on Mon and Fri and 6 mg all other days of the week. Encouraged to maintain a consistency of vegetables/salads. Recheck INR in 4 weeks, 11/16  Patient is losing insurance at the end of the month and does not have new job yet.  Provided financial aid counselor to discuss options to still get care for the time being     Referring is Dr. Jose Milan   INR (no units)   Date Value   10/04/2021 2.2   09/20/2021 2.9   09/08/2021 3   08/31/2021 2.3   07/21/2021 2.00   07/14/2021 1.50   07/08/2021 1.60   07/01/2021 2.30     For Pharmacy Admin Tracking Only     Total # of Interventions Recommended: 0   Total # of Interventions Accepted: 0   Time Spent (min): 15

## 2021-11-10 ENCOUNTER — OFFICE VISIT (OUTPATIENT)
Dept: FAMILY MEDICINE CLINIC | Age: 61
End: 2021-11-10
Payer: COMMERCIAL

## 2021-11-10 VITALS
HEART RATE: 104 BPM | BODY MASS INDEX: 27.25 KG/M2 | DIASTOLIC BLOOD PRESSURE: 78 MMHG | HEIGHT: 69 IN | WEIGHT: 184 LBS | SYSTOLIC BLOOD PRESSURE: 134 MMHG | OXYGEN SATURATION: 99 %

## 2021-11-10 DIAGNOSIS — D50.9 IRON DEFICIENCY ANEMIA, UNSPECIFIED IRON DEFICIENCY ANEMIA TYPE: ICD-10-CM

## 2021-11-10 DIAGNOSIS — I10 ESSENTIAL HYPERTENSION: ICD-10-CM

## 2021-11-10 DIAGNOSIS — E11.42 TYPE 2 DIABETES MELLITUS WITH DIABETIC POLYNEUROPATHY, WITHOUT LONG-TERM CURRENT USE OF INSULIN (HCC): Primary | ICD-10-CM

## 2021-11-10 DIAGNOSIS — Z12.5 PROSTATE CANCER SCREENING: ICD-10-CM

## 2021-11-10 DIAGNOSIS — E78.00 HYPERCHOLESTEREMIA: ICD-10-CM

## 2021-11-10 PROCEDURE — 99214 OFFICE O/P EST MOD 30 MIN: CPT | Performed by: FAMILY MEDICINE

## 2021-11-10 RX ORDER — FERROUS SULFATE 325(65) MG
325 TABLET ORAL
Qty: 90 TABLET | Refills: 3 | Status: SHIPPED | OUTPATIENT
Start: 2021-11-10

## 2021-11-10 RX ORDER — LISINOPRIL 20 MG/1
TABLET ORAL
Qty: 90 TABLET | Refills: 3 | Status: SHIPPED | OUTPATIENT
Start: 2021-11-10 | End: 2022-10-03 | Stop reason: SDUPTHER

## 2021-11-10 NOTE — PROGRESS NOTES
Ethan Rios is a 59 y.o. male. HPI:  Diabetes Mellitus Type II, Follow-up--   Lab Results   Component Value Date    LABA1C 6.7 05/13/2021      Checks sugars at home:Yes. trend: stable. Last Eye Exam was 6m ago. Pt is on ACEI or ARB. Pt denies foot ulcerations, paresthesia of the feet and visual disturbances. pt does adhere to a low carb diet. HTN--Pt seen here for follow up regarding HTN. BP checks at home:Yes  BP readings 130/70. Pt denies blurred vision, dyspnea, palpitations and peripheral edema. Pt complains of none. Tolerating medications: Yes. Pt does exercise regularly and does adhere to a low salt diet. Hyperlipidemia:    Lab Results   Component Value Date    LDLCALC 113 (H) 05/22/2021   . He does not have myalgias from medication. Pt is  following Lifestyle modification including Low fat/low cholesterol diet, low carbohydrate diet, and does exercise regularly. has been tired the past week or so. Current Outpatient Medications   Medication Sig Dispense Refill    lisinopril (PRINIVIL;ZESTRIL) 20 MG tablet TAKE ONE TABLET BY MOUTH DAILY 30 tablet 0    Metoprolol Tartrate 37.5 MG TABS Take 37.5 mg by mouth 2 times daily 180 tablet 3    tamsulosin (FLOMAX) 0.4 MG capsule TAKE TWO CAPSULES BY MOUTH DAILY 60 capsule 4    metFORMIN (GLUCOPHAGE) 1000 MG tablet TAKE ONE TABLET BY MOUTH TWICE A DAY WITH A MEAL 60 tablet 2    rosuvastatin (CRESTOR) 40 MG tablet Take 1 tablet by mouth nightly 90 tablet 3    amLODIPine (NORVASC) 5 MG tablet Take 1 tablet by mouth daily 90 tablet 3    glucose monitoring (FREESTYLE FREEDOM) kit 1 kit by Does not apply route daily 1 kit 0    Lancets MISC Use as directed 100 each 3    blood glucose monitor strips Test 1 times a day & as needed for symptoms of irregular blood glucose.  100 strip 3    warfarin (COUMADIN) 1 MG tablet Take 3 tablets by mouth daily 60 tablet 1    acetaminophen (TYLENOL) 500 MG tablet Take 2 tablets by mouth every 6 hours 120 Hypertension     Polyneuropathy associated with underlying disease ([address] 10/19/2017     Past Surgical History:   Procedure Laterality Date    CARDIAC CATHETERIZATION      Patient staes normal    CORONARY ARTERY BYPASS GRAFT N/A 2021    URGENT CABG CORONARY ARTERY BYPASS GRAFTING X 4, CISNEROS GRAFT TO LAD X 1, SEQUENTIAL VEIN GRAFT TO OM1 AND RPDA, VEIN GRAFT TO DIAGONAL WITH PROXIMAL ANASTIMOSIS END TO SIDE TO VEIN GRAFT TO OM1 AND RPDA, LIGATION SANDRO WITH 50 MM ATRICLIP, EVH LEFT LEG, TOTAL CPB, REJI, DOPPLER BYPASS GRAFT FLOW VERIFICATION, INSERTION OF VENTRICULAR PACING WIRES, BILATERAL INTERCOSTAL NERVE BLOCK WITH EXPAREL AND MODESTO    CYSTOSCOPY  2021    CYSTOSCOPY, URETHRAL DILATION, INSERTION OF URETHRAL CATHETER performed by Vipul Jacobson MD at [address] [facility]  11/30/15    with gastric biopsy    UPPER GASTROINTESTINAL ENDOSCOPY N/A 10/12/2018    EGD BIOPSY performed by Melia Andrew MD at [address]  2017, 2017    Dr. Maxine Conde     No family history on file.   Social History     Socioeconomic History    Marital status:      Spouse name: Not on file    Number of children: Not on file    Years of education: Not on file    Highest education level: Not on file   Occupational History    Not on file   Tobacco Use    Smoking status: Former Smoker     Packs/day: 1.00     Years: 20.00     Pack years: 20.00     Types: Cigarettes     Quit date: 2009     Years since quittin.9    Smokeless tobacco: Never Used   Substance and Sexual Activity    Alcohol use: No    Drug use: No    Sexual activity: Not on file   Other Topics Concern    Not on file   Social History Narrative    Not on file     Social Determinants of Health     Financial Resource Strain: Low Risk     Difficulty of Paying Living Expenses: Not hard at all   Food Insecurity: No Food Insecurity    Worried About [address] [employer] in the Last Year: Never true    Ran Out of Food in the Last Year: Never true   Transportation Needs:     Lack of Transportation (Medical): Not on file    Lack of Transportation (Non-Medical): Not on file   Physical Activity:     Days of Exercise per Week: Not on file    Minutes of Exercise per Session: Not on file   Stress:     Feeling of Stress : Not on file   Social Connections:     Frequency of Communication with Friends and Family: Not on file    Frequency of Social Gatherings with Friends and Family: Not on file    Attends Baptism Services: Not on file    Active Member of [address] [place] [address] [employer] or Organizations: Not on file    Attends Club or Organization Meetings: Not on file    Marital Status: Not on file   Intimate Partner Violence:     Fear of Current or Ex-Partner: Not on file    Emotionally Abused: Not on file    Physically Abused: Not on file    Sexually Abused: Not on file   Housing Stability:     Unable to Pay for Housing in the Last Year: Not on file    Number of [place] in the Last Year: Not on file    Unstable Housing in the Last Year: Not on file         ROS:  Gen:  Denies fever, chills, headaches. HEENT:  Denies cold symptoms, sore throat. CV:  Denies chest pain or tightness, palpitations. Pulm:  Denies shortness of breath, cough. Abd:  Denies abdominal pain, change in bowel habits. I have reviewed the patient's medical history in detail and updated the computerized patient record as appropriate. OBJECTIVE:  /78 (Site: Right Upper Arm, Position: Sitting, Cuff Size: Medium Adult)   Pulse 104   Ht 5' 9\" (1.753 m)   Wt 184 lb (83.5 kg)   SpO2 99%   BMI 27.17 kg/m²   GEN:  WDWN, NAD  HEENT:  NCAT, TM/OP nl, PERRL, EOMI. NECK:  Supple without adenopathy. CV:  Regular rate and rhythm, S1 and S2 normal, no murmurs, clicks, gallops or rubs. No edema. PULM:  Chest is clear, no wheezing or rales. Normal symmetric air entry throughout both lung fields.   ABD: soft, Non-tender, non-distended,

## 2021-11-16 ENCOUNTER — TELEPHONE (OUTPATIENT)
Dept: PHARMACY | Age: 61
End: 2021-11-16

## 2021-11-16 NOTE — TELEPHONE ENCOUNTER
Patient called to cancel appointment today. States that he does not have any more insurance. He verified that he filled out financial aid paperwork for [facility] but is refusing to return to clinic. States that he might get new insurance in the new year but is unsure. Advised for patient to call cardiology today to notify as extremely unsafe to be on warfarin and not be monitored for >3 months. Pt verified understanding. Cancelled appt for INR check today.  Last INR check was 10/18/21

## 2021-12-21 NOTE — TELEPHONE ENCOUNTER
Called patient to see if he had obtained new insurance for 2022. The connection was not good, it sounded like he was in a car. I tried twice and he could not hear me and hung up.

## 2021-12-28 NOTE — TELEPHONE ENCOUNTER
Spoke with patient to see if he had been able to get new insurance for the new year. Patient states he is no longer taking warfarin and will not be restarting. States he has already told someone here. Will follow-up with Dr. Omar Barron office tomorrow.

## 2021-12-29 ENCOUNTER — TELEPHONE (OUTPATIENT)
Dept: FAMILY MEDICINE CLINIC | Age: 61
End: 2021-12-29

## 2021-12-29 NOTE — TELEPHONE ENCOUNTER
Rodney spoke with Mae @ [facility]. L/m for Dr. Miryam De La Fuente to let her know that patient states he stopped his warfarin 6 wks ago and \"is done with that\". He is also not scheduling or keeping cardio appts. Clinic will have to discharge patient soon. Isabel Zepeda will let Dr. Miryam De La Fuente know.

## 2022-01-17 ENCOUNTER — ANTI-COAG VISIT (OUTPATIENT)
Dept: PHARMACY | Age: 62
End: 2022-01-17

## 2022-01-17 PROBLEM — I48.91 ATRIAL FIBRILLATION (HCC): Status: RESOLVED | Noted: 2021-05-12 | Resolved: 2022-01-17

## 2022-03-07 DIAGNOSIS — R35.1 NOCTURIA: ICD-10-CM

## 2022-03-07 RX ORDER — TAMSULOSIN HYDROCHLORIDE 0.4 MG/1
CAPSULE ORAL
Qty: 60 CAPSULE | Refills: 4 | Status: SHIPPED | OUTPATIENT
Start: 2022-03-07 | End: 2022-07-21

## 2022-03-07 NOTE — TELEPHONE ENCOUNTER
----- Message from [facility] sent at 3/7/2022  1:50 PM EST -----  Subject: Refill Request    QUESTIONS  Name of Medication? tamsulosin (FLOMAX) 0.4 MG capsule  Patient-reported dosage and instructions? 0.4  How many days do you have left? 0  Preferred Pharmacy? [address]  Pharmacy phone number (if available)? [phone]  ---------------------------------------------------------------------------  --------------  Aundrea RAYGOZA  What is the best way for the office to contact you? OK to leave message on   voicemail  Preferred Call Back Phone Number?  [phone]

## 2022-03-07 NOTE — TELEPHONE ENCOUNTER
Medication:   Requested Prescriptions     Pending Prescriptions Disp Refills    tamsulosin (FLOMAX) 0.4 MG capsule 60 capsule 4        Last Filled:  10/6/2021 60 caps 4 refills     Patient Phone Number: [phone] (home) [phone] (work)    Last appt: 11/10/2021   Next appt: 3/14/2022    Last OARRS: No flowsheet data found.

## 2022-03-14 ENCOUNTER — OFFICE VISIT (OUTPATIENT)
Dept: FAMILY MEDICINE CLINIC | Age: 62
End: 2022-03-14
Payer: COMMERCIAL

## 2022-03-14 VITALS
HEIGHT: 69 IN | DIASTOLIC BLOOD PRESSURE: 78 MMHG | OXYGEN SATURATION: 98 % | WEIGHT: 186 LBS | BODY MASS INDEX: 27.55 KG/M2 | HEART RATE: 106 BPM | SYSTOLIC BLOOD PRESSURE: 144 MMHG

## 2022-03-14 DIAGNOSIS — E78.00 HYPERCHOLESTEREMIA: ICD-10-CM

## 2022-03-14 DIAGNOSIS — G89.29 CHRONIC LEFT SI JOINT PAIN: ICD-10-CM

## 2022-03-14 DIAGNOSIS — I71.23 ANEURYSM OF DESCENDING THORACIC AORTA: ICD-10-CM

## 2022-03-14 DIAGNOSIS — E11.42 TYPE 2 DIABETES MELLITUS WITH DIABETIC POLYNEUROPATHY, WITHOUT LONG-TERM CURRENT USE OF INSULIN (HCC): ICD-10-CM

## 2022-03-14 DIAGNOSIS — G63 POLYNEUROPATHY ASSOCIATED WITH UNDERLYING DISEASE (HCC): ICD-10-CM

## 2022-03-14 DIAGNOSIS — Z00.00 ANNUAL PHYSICAL EXAM: Primary | ICD-10-CM

## 2022-03-14 DIAGNOSIS — I10 ESSENTIAL HYPERTENSION: ICD-10-CM

## 2022-03-14 DIAGNOSIS — M53.3 CHRONIC LEFT SI JOINT PAIN: ICD-10-CM

## 2022-03-14 PROCEDURE — 99396 PREV VISIT EST AGE 40-64: CPT | Performed by: FAMILY MEDICINE

## 2022-03-14 PROCEDURE — 99213 OFFICE O/P EST LOW 20 MIN: CPT | Performed by: FAMILY MEDICINE

## 2022-03-14 RX ORDER — METOPROLOL TARTRATE 37.5 MG/1
37.5 TABLET, FILM COATED ORAL 2 TIMES DAILY
Qty: 180 TABLET | Refills: 3 | Status: SHIPPED | OUTPATIENT
Start: 2022-03-14 | End: 2022-10-03 | Stop reason: SDUPTHER

## 2022-03-14 ASSESSMENT — PATIENT HEALTH QUESTIONNAIRE - PHQ9
SUM OF ALL RESPONSES TO PHQ9 QUESTIONS 1 & 2: 0
SUM OF ALL RESPONSES TO PHQ QUESTIONS 1-9: 0
2. FEELING DOWN, DEPRESSED OR HOPELESS: 0
SUM OF ALL RESPONSES TO PHQ QUESTIONS 1-9: 0
1. LITTLE INTEREST OR PLEASURE IN DOING THINGS: 0
SUM OF ALL RESPONSES TO PHQ QUESTIONS 1-9: 0
SUM OF ALL RESPONSES TO PHQ QUESTIONS 1-9: 0

## 2022-03-14 NOTE — PROGRESS NOTES
SUBJECTIVE:   Jennie Stephen is a 57 y.o. male presenting for his annual checkup. HPI: c/o dizziness x 2 weeks. Feels spinning sensation with positional changes. C/o burning sensation in legs when sleeps. Hx neuropathy. tylenol PRN helps. C/o pain in left hip when walking a lot. Is only able to walk 5 min at a time. Improves with rest. Pain mostly on back of hip/buttock. Has not seen cardiology in 7m. Stopped metoprolol and amlodipine since ran out.       Patient Active Problem List   Diagnosis    Chest pain    Type 2 diabetes mellitus with diabetic polyneuropathy, without long-term current use of insulin ([address]    Essential hypertension    Hypercholesteremia    Former smoker    Medical non-compliance    Diverticulosis    Polyneuropathy associated with underlying disease ([address]    Aneurysm of descending thoracic aorta (HCC)    Atherosclerosis of aorta ([address]    Unstable angina ([address]       Past Medical History:   Diagnosis Date    AAA (abdominal aortic aneurysm) ([address] 11/28/2015    4.3 cm 9/2019. yearly CT    Aneurysm of descending thoracic aorta ([address] 03/28/2018    2018: 3 cm    Arthritis     Atherosclerosis of aorta ([address] 03/28/2018    extensive per CT 2018    Back pain     Coronary artery disease involving native coronary artery of native heart with unstable angina pectoris ([address]     Diabetes mellitus ([address]     Diverticulosis     Elevated PSA 10/19/2017    Hyperlipidemia     Hypertension     Polyneuropathy associated with underlying disease ([address] 10/19/2017       Past Surgical History:   Procedure Laterality Date    CARDIAC CATHETERIZATION      Patient staes normal    CORONARY ARTERY BYPASS GRAFT N/A 5/25/2021    URGENT CABG CORONARY ARTERY BYPASS GRAFTING X 4, CISNEROS GRAFT TO LAD X 1, SEQUENTIAL VEIN GRAFT TO OM1 AND RPDA, VEIN GRAFT TO DIAGONAL WITH PROXIMAL ANASTIMOSIS END TO SIDE TO VEIN GRAFT TO OM1 AND RPDA, LIGATION SANDRO WITH 50 MM ATRICLIP, EVH LEFT LEG, TOTAL CPB, REJI, DOPPLER BYPASS GRAFT FLOW VERIFICATION, INSERTION OF VENTRICULAR PACING WIRES, BILATERAL INTERCOSTAL NERVE BLOCK WITH EXPAREL AND MODESTO    CYSTOSCOPY  2021    CYSTOSCOPY, URETHRAL DILATION, INSERTION OF URETHRAL CATHETER performed by Sourav Perez MD at [address]  11/30/15    with gastric biopsy    UPPER GASTROINTESTINAL ENDOSCOPY N/A 10/12/2018    EGD BIOPSY performed by Jonathan Rose MD at [address]  2017, 2017    Dr. Khris Dumont History    Marital status:      Spouse name: Not on file    Number of children: Not on file    Years of education: Not on file    Highest education level: Not on file   Occupational History    Not on file   Tobacco Use    Smoking status: Former Smoker     Packs/day: 1.00     Years: 20.00     Pack years: 20.00     Types: Cigarettes     Quit date: 2009     Years since quittin.2    Smokeless tobacco: Never Used   Substance and Sexual Activity    Alcohol use: No    Drug use: No    Sexual activity: Not on file   Other Topics Concern    Not on file   Social History Narrative    Not on file     Social Determinants of Health     Financial Resource Strain: Low Risk     Difficulty of Paying Living Expenses: Not hard at all   Food Insecurity: No Food Insecurity    Worried About [address] [place] [address] in the Last Year: Never true    Bere of Food in the Last Year: Never true   Transportation Needs:     Lack of Transportation (Medical): Not on file    Lack of Transportation (Non-Medical):  Not on file   Physical Activity:     Days of Exercise per Week: Not on file    Minutes of Exercise per Session: Not on file   Stress:     Feeling of Stress : Not on file   Social Connections:     Frequency of Communication with Friends and Family: Not on file    Frequency of Social Gatherings with Friends and Family: Not on file    Attends Ep[employer] Services: Not on file    Active Member of Clubs or Organizations: Not on file    Attends Club or Organization Meetings: Not on file    Marital Status: Not on file   Intimate Partner Violence:     Fear of Current or Ex-Partner: Not on file    Emotionally Abused: Not on file    Physically Abused: Not on file    Sexually Abused: Not on file   Housing Stability:     Unable to Pay for Housing in the Last Year: Not on file    Number of [place] in the Last Year: Not on file    Unstable Housing in the Last Year: Not on file       No family history on file. Current Outpatient Medications   Medication Sig Dispense Refill    tamsulosin (FLOMAX) 0.4 MG capsule TAKE TWO CAPSULES BY MOUTH DAILY 60 capsule 4    lisinopril (PRINIVIL;ZESTRIL) 20 MG tablet TAKE ONE TABLET BY MOUTH DAILY 90 tablet 3    metFORMIN (GLUCOPHAGE) 1000 MG tablet TAKE ONE TABLET BY MOUTH TWICE A DAY WITH A MEAL 180 tablet 3    ferrous sulfate (IRON 325) 325 (65 Fe) MG tablet Take 1 tablet by mouth daily (with breakfast) 90 tablet 3    Metoprolol Tartrate 37.5 MG TABS Take 37.5 mg by mouth 2 times daily 180 tablet 3    rosuvastatin (CRESTOR) 40 MG tablet Take 1 tablet by mouth nightly 90 tablet 3    amLODIPine (NORVASC) 5 MG tablet Take 1 tablet by mouth daily 90 tablet 3    glucose monitoring (FREESTYLE FREEDOM) kit 1 kit by Does not apply route daily 1 kit 0    Lancets MISC Use as directed 100 each 3    blood glucose monitor strips Test 1 times a day & as needed for symptoms of irregular blood glucose. 100 strip 3    warfarin (COUMADIN) 1 MG tablet Take 3 tablets by mouth daily 60 tablet 1    acetaminophen (TYLENOL) 500 MG tablet Take 2 tablets by mouth every 6 hours 120 tablet 3    warfarin (COUMADIN) 2.5 MG tablet Take 1 tablet by mouth daily Take 2.5 mg tonight.  [facility] to draw INR tomorrow and further dosing instructions after 30 tablet 3    aspirin 81 MG tablet Take 81 mg by mouth daily      traMADol (ULTRAM) 50 MG tablet Take 50 mg by mouth every 6 hours as needed. No current facility-administered medications for this visit. Allergies: Patient has no known allergies. Health Maintenance   Topic Date Due    COVID-19 Vaccine (1) Never done    Depression Screen  Never done    Diabetic retinal exam  Never done    Diabetic microalbuminuria test  08/12/2021    Flu vaccine (1) 11/10/2022 (Originally 9/1/2021)    Shingles Vaccine (1 of 2) 11/10/2022 (Originally 3/14/2008)    A1C test (Diabetic or Prediabetic)  05/13/2022    Lipid screen  05/22/2022    Potassium monitoring  06/02/2022    Creatinine monitoring  06/02/2022    Diabetic foot exam  08/09/2022    Pneumococcal 0-64 years Vaccine (2 of 2 - PPSV23) 03/14/2023    DTaP/Tdap/Td vaccine (2 - Td or Tdap) 07/14/2027    Colorectal Cancer Screen  10/12/2027    Hepatitis A vaccine  Aged Out    Hib vaccine  Aged Out    Meningococcal (ACWY) vaccine  Aged Out    Hepatitis C screen  Discontinued    HIV screen  Discontinued    Low dose CT lung screening  Discontinued         ROS:    Skin: no changing moles, abnormal pigmentation, rash, scaling, itching, masses, hair or nail changes  Eyes: no change in vision  Ears/Nose/Throat: no hearing loss, tinnitus, vertigo, nosebleed, nasal congestion, rhinorrhea, sore throat  Respiratory: no cough or shortness of breath  Cardiovascular: no chest pain, SAL, orthopnea, PND, palpitations, or claudication  Gastrointestinal: no nausea, vomiting, abdominal pain or change in stools. No blood in stools. Genitourinary: no urinary symptoms or incontinence. No erectile dysfunction.   Musculoskeletal: no arthritis, arthralgia, myalgia or weakness  Neurologic: no weakness/numbness, seizures, or headaches  Hematologic/Lymphatic/Immunologic: no abnormal bleeding/bruising, fever, chills, night sweats, swollen glands, or unexplained weight loss  Endocrine: no heat or cold intolerance and no polyphagia, polydipsia, or polyuria    OBJECTIVE:   BP (!) 144/78 (Site: Right Upper Arm, Position: Sitting, Cuff Size: Medium Adult)   Pulse 106   Ht 5' 9\" (1.753 m)   Wt 186 lb (84.4 kg)   SpO2 98%   BMI 27.47 kg/m²   General appearance: healthy, alert, no distress  Skin: Skin color, texture, turgor normal. No rashes or suspicious lesions. No induration or tightening palpated. Head: Normocephalic. No masses, lesions, tenderness or abnormalities  Eyes: Conjunctivae/corneas clear. PERRL, EOM's intact. Ears: External ears normal. Canals clear. TM's clear bilaterally. Hearing grossly normal.  Nose/Sinuses: Nares normal.   Oropharynx: Lips, mucosa, and tongue normal. Teeth and gums normal. Oropharynx clear with no exudate seen. Neck: Neck supple, and symmetric. No adenopathy. Thyroid symmetric, normal size, without nodule. Trachea is midline. Back: Back symmetric, no curvature. ROM normal. No CVA tenderness. Lungs: Good diaphragmatic excursion. Lungs clear to auscultation bilaterally. No retractions or use of accessory muscles. Heart: PMI is not displaced, and no thrill noted. Regular rate and rhythm, with no rub, murmur or gallop noted. Abdomen: Abdomen soft, non-tender. BS normal. No masses, organomegaly. No hernia noted. Extremities: Extremities normal. No deformities, edema, or skin discoloration. No cyanosis or clubbing noted to the nails. Hands and feet were warm and well-perfused with palpable dorsalis pedis pulses bilaterally. Lymph: No lymphadenopathy of the neck or supraclavicular regions. Musculoskeletal: Spine ROM normal. Muscular strength intact. ttp over left Si joint posteriorly. Lumbar ROM intact. No spinal tenderness noted. Neuro: Cranial nerves intact, gait normal. No focal weakness. ASSESSMENT/PLAN:  1. Annual physical exam  - CBC with Auto Differential; Future  - Comprehensive Metabolic Panel; Future  - Hemoglobin A1C; Future  - Lipid Panel; Future  - TSH with Reflex;  Future  - PSA, Prostatic Specific Antigen; Future    2. Type 2 diabetes mellitus with diabetic polyneuropathy, without long-term current use of insulin (HCC)  Has been stable on metformin, will recheck A1C  - Hemoglobin A1C; Future    3. Essential hypertension  Uncontrolled on ACEI only  Will restart BB as well given hx CAD  - CBC with Auto Differential; Future  - Comprehensive Metabolic Panel; Future  - TSH with Reflex; Future  - Metoprolol Tartrate 37.5 MG TABS; Take 37.5 mg by mouth 2 times daily  Dispense: 180 tablet; Refill: 3    4. Hypercholesteremia  Check lipids  Continue statin  - Lipid Panel; Future    5. Aneurysm of descending thoracic aorta (HCC)  Pt overdue for CTA. Pt reminded to schedule ASAP and follow up with vascular    6. Polyneuropathy associated with underlying disease ([address]  Likely from diabetes mellitus  Tylenol helps, pt declines other treatment at this time    7. Chronic left SI joint pain  Supportive care discussed  PT referral offered, pt declines         All health maintenance issues were updated.   Recommend continue current healthy lifestyle patterns

## 2022-07-20 DIAGNOSIS — R35.1 NOCTURIA: ICD-10-CM

## 2022-07-21 RX ORDER — TAMSULOSIN HYDROCHLORIDE 0.4 MG/1
CAPSULE ORAL
Qty: 60 CAPSULE | Refills: 4 | Status: SHIPPED | OUTPATIENT
Start: 2022-07-21 | End: 2022-10-03 | Stop reason: SDUPTHER

## 2022-07-21 NOTE — TELEPHONE ENCOUNTER
Medication:   Requested Prescriptions     Pending Prescriptions Disp Refills    tamsulosin (FLOMAX) 0.4 MG capsule [Pharmacy Med Name: TAMSULOSIN HCL 0.4 MG CAPSULE] 60 capsule 4     Sig: TAKE TWO CAPSULES BY MOUTH DAILY        Last Filled:  3/7/2022 60 caps 4 refills     Patient Phone Number: [phone] (home) [phone] (work)    Last appt: 3/14/2022   Next appt: Visit date not found    Last OARRS: No flowsheet data found.

## 2022-10-03 ENCOUNTER — OFFICE VISIT (OUTPATIENT)
Dept: FAMILY MEDICINE CLINIC | Age: 62
End: 2022-10-03
Payer: COMMERCIAL

## 2022-10-03 VITALS
BODY MASS INDEX: 26.96 KG/M2 | SYSTOLIC BLOOD PRESSURE: 152 MMHG | HEIGHT: 69 IN | OXYGEN SATURATION: 98 % | HEART RATE: 69 BPM | WEIGHT: 182 LBS | DIASTOLIC BLOOD PRESSURE: 84 MMHG

## 2022-10-03 DIAGNOSIS — R35.1 NOCTURIA: ICD-10-CM

## 2022-10-03 DIAGNOSIS — I48.19 PERSISTENT ATRIAL FIBRILLATION (HCC): ICD-10-CM

## 2022-10-03 DIAGNOSIS — Z95.1 S/P CORONARY ARTERY BYPASS GRAFT X 4: ICD-10-CM

## 2022-10-03 DIAGNOSIS — E78.00 HYPERCHOLESTEREMIA: ICD-10-CM

## 2022-10-03 DIAGNOSIS — I10 ESSENTIAL HYPERTENSION: ICD-10-CM

## 2022-10-03 DIAGNOSIS — E11.42 TYPE 2 DIABETES MELLITUS WITH DIABETIC POLYNEUROPATHY, WITHOUT LONG-TERM CURRENT USE OF INSULIN (HCC): Primary | ICD-10-CM

## 2022-10-03 DIAGNOSIS — Z86.79 HISTORY OF CORONARY ARTERY DISEASE: ICD-10-CM

## 2022-10-03 DIAGNOSIS — Z00.00 HEALTHCARE MAINTENANCE: ICD-10-CM

## 2022-10-03 DIAGNOSIS — Z91.199 MEDICAL NON-COMPLIANCE: ICD-10-CM

## 2022-10-03 DIAGNOSIS — R07.9 CHEST PAIN, UNSPECIFIED TYPE: ICD-10-CM

## 2022-10-03 PROCEDURE — 99215 OFFICE O/P EST HI 40 MIN: CPT | Performed by: FAMILY MEDICINE

## 2022-10-03 PROCEDURE — 93000 ELECTROCARDIOGRAM COMPLETE: CPT | Performed by: FAMILY MEDICINE

## 2022-10-03 RX ORDER — METOPROLOL TARTRATE 37.5 MG/1
37.5 TABLET, FILM COATED ORAL 2 TIMES DAILY
Qty: 180 TABLET | Refills: 1 | Status: SHIPPED | OUTPATIENT
Start: 2022-10-03

## 2022-10-03 RX ORDER — WARFARIN SODIUM 1 MG/1
3 TABLET ORAL DAILY
Qty: 60 TABLET | Refills: 1 | Status: SHIPPED | OUTPATIENT
Start: 2022-10-03

## 2022-10-03 RX ORDER — LISINOPRIL 20 MG/1
TABLET ORAL
Qty: 90 TABLET | Refills: 1 | Status: SHIPPED | OUTPATIENT
Start: 2022-10-03

## 2022-10-03 RX ORDER — TAMSULOSIN HYDROCHLORIDE 0.4 MG/1
CAPSULE ORAL
Qty: 180 CAPSULE | Refills: 1 | Status: SHIPPED | OUTPATIENT
Start: 2022-10-03

## 2022-10-03 RX ORDER — ROSUVASTATIN CALCIUM 40 MG/1
40 TABLET, COATED ORAL NIGHTLY
Qty: 90 TABLET | Refills: 1 | Status: SHIPPED | OUTPATIENT
Start: 2022-10-03

## 2022-10-03 RX ORDER — AMLODIPINE BESYLATE 5 MG/1
5 TABLET ORAL DAILY
Qty: 90 TABLET | Refills: 1 | Status: SHIPPED | OUTPATIENT
Start: 2022-10-03

## 2022-10-03 SDOH — ECONOMIC STABILITY: FOOD INSECURITY: WITHIN THE PAST 12 MONTHS, THE FOOD YOU BOUGHT JUST DIDN'T LAST AND YOU DIDN'T HAVE MONEY TO GET MORE.: NEVER TRUE

## 2022-10-03 SDOH — ECONOMIC STABILITY: FOOD INSECURITY: WITHIN THE PAST 12 MONTHS, YOU WORRIED THAT YOUR FOOD WOULD RUN OUT BEFORE YOU GOT MONEY TO BUY MORE.: NEVER TRUE

## 2022-10-03 ASSESSMENT — SOCIAL DETERMINANTS OF HEALTH (SDOH): HOW HARD IS IT FOR YOU TO PAY FOR THE VERY BASICS LIKE FOOD, HOUSING, MEDICAL CARE, AND HEATING?: NOT HARD AT ALL

## 2022-10-03 NOTE — PROGRESS NOTES
Keren Segovia is a 57 y.o. male. HPI:  Diabetes Mellitus Type II, Follow-up--   Lab Results   Component Value Date    LABA1C 6.7 05/13/2021      Checks sugars at home:No. patient does not test.  Last Eye Exam was over a year ago. Pt is on ACEI or ARB. Pt denies foot ulcerations, paresthesia of the feet, and visual disturbances. pt does adhere to a low carb diet. HTN--Pt seen here for follow up regarding HTN. BP checks at home:No  Is taking his metoprolol once daily only, not BID. Is no longer taking his norvasc. Pt denies palpitations and peripheral edema. Pt complains of chest pain. Tolerating medications: Yes. Pt does exercise regularly and does adhere to a low salt diet. Hyperlipidemia:    Lab Results   Component Value Date    LDLCALC 113 (H) 05/22/2021   . He does not have myalgias from medication. Pt is  following Lifestyle modification including Low fat/low cholesterol diet, low carbohydrate diet, and does exercise regularly. c/o chest pain- has been going to the gym and lifting weights. Feels like pressure in chest across entire chest after walking on machine or lifting weights. Last work out was 3 days ago and still having pain today. No shortness of breath. Does have some dizziness as well. Sometimes pain is reproducible with palpation on chest. S/p CABG x 4 in May 2021. Hx pAfib- was on warfarin. Stopped taking ASA a while ago after saw something on TV. Stopped coumadin, not sure why.      Current Outpatient Medications   Medication Sig Dispense Refill    tamsulosin (FLOMAX) 0.4 MG capsule TAKE TWO CAPSULES BY MOUTH DAILY 60 capsule 4    Metoprolol Tartrate 37.5 MG TABS Take 37.5 mg by mouth 2 times daily 180 tablet 3    lisinopril (PRINIVIL;ZESTRIL) 20 MG tablet TAKE ONE TABLET BY MOUTH DAILY 90 tablet 3    metFORMIN (GLUCOPHAGE) 1000 MG tablet TAKE ONE TABLET BY MOUTH TWICE A DAY WITH A MEAL 180 tablet 3    ferrous sulfate (IRON 325) 325 (65 Fe) MG tablet Take 1 tablet by mouth daily (with breakfast) 90 tablet 3    rosuvastatin (CRESTOR) 40 MG tablet Take 1 tablet by mouth nightly 90 tablet 3    glucose monitoring (FREESTYLE FREEDOM) kit 1 kit by Does not apply route daily 1 kit 0    Lancets MISC Use as directed 100 each 3    blood glucose monitor strips Test 1 times a day & as needed for symptoms of irregular blood glucose. 100 strip 3    acetaminophen (TYLENOL) 500 MG tablet Take 2 tablets by mouth every 6 hours 120 tablet 3    aspirin 81 MG tablet Take 81 mg by mouth daily      traMADol (ULTRAM) 50 MG tablet Take 50 mg by mouth every 6 hours as needed. No current facility-administered medications for this visit. Health Maintenance   Topic Date Due    COVID-19 Vaccine (1) Never done    Diabetic retinal exam  Never done    Diabetic microalbuminuria test  08/12/2021    A1C test (Diabetic or Prediabetic)  05/13/2022    Lipids  05/22/2022    Flu vaccine (1) Never done    Diabetic foot exam  08/09/2022    Shingles vaccine (1 of 2) 11/10/2022 (Originally 3/14/2008)    Depression Screen  03/14/2023    DTaP/Tdap/Td vaccine (2 - Td or Tdap) 07/14/2027    Colorectal Cancer Screen  10/12/2027    Pneumococcal 0-64 years Vaccine  Completed    Hepatitis A vaccine  Aged Out    Hib vaccine  Aged Out    Meningococcal (ACWY) vaccine  Aged Out    Hepatitis C screen  Discontinued    HIV screen  Discontinued       I have reviewed the patient's medical/surgical/family/social in detail and updated the computerized patient record as appropriate.     Past Medical History:   Diagnosis Date    AAA (abdominal aortic aneurysm) ([address] 11/28/2015    4.3 cm 9/2019. yearly CT    Aneurysm of descending thoracic aorta ([address] 03/28/2018    2018: 3 cm    Arthritis     Atherosclerosis of aorta ([address] 03/28/2018    extensive per CT 2018    Back pain     Coronary artery disease involving native coronary artery of native heart with unstable angina pectoris ([address]     Diabetes mellitus ([address]     Diverticulosis Elevated PSA 10/19/2017    Hyperlipidemia     Hypertension     Polyneuropathy associated with underlying disease ([facility] 10/19/2017     Past Surgical History:   Procedure Laterality Date    CARDIAC CATHETERIZATION      Patient staes normal    CORONARY ARTERY BYPASS GRAFT N/A 2021    URGENT CABG CORONARY ARTERY BYPASS GRAFTING X 4, CISNEROS GRAFT TO LAD X 1, SEQUENTIAL VEIN GRAFT TO OM1 AND RPDA, VEIN GRAFT TO DIAGONAL WITH PROXIMAL ANASTIMOSIS END TO SIDE TO VEIN GRAFT TO OM1 AND RPDA, LIGATION SANDRO WITH 50 MM ATRICLIP, EVH LEFT LEG, TOTAL CPB, REJI, DOPPLER BYPASS GRAFT FLOW VERIFICATION, INSERTION OF VENTRICULAR PACING WIRES, BILATERAL INTERCOSTAL NERVE BLOCK WITH EXPAREL AND MODESTO    CYSTOSCOPY  2021    CYSTOSCOPY, URETHRAL DILATION, INSERTION OF URETHRAL CATHETER performed by Panfilo Gonzalez MD at [address] [place] [address]  11/30/15    with gastric biopsy    UPPER GASTROINTESTINAL ENDOSCOPY N/A 10/12/2018    EGD BIOPSY performed by Paola Barlow MD at [facility]  2017, 2017    Dr. Zainab Fatima     No family history on file.   Social History     Socioeconomic History    Marital status:      Spouse name: Not on file    Number of children: Not on file    Years of education: Not on file    Highest education level: Not on file   Occupational History    Not on file   Tobacco Use    Smoking status: Former     Packs/day: 1.00     Years: 20.00     Pack years: 20.00     Types: Cigarettes     Quit date: 2009     Years since quittin.8    Smokeless tobacco: Never   Substance and Sexual Activity    Alcohol use: No    Drug use: No    Sexual activity: Not on file   Other Topics Concern    Not on file   Social History Narrative    Not on file     Social Determinants of Health     Financial Resource Strain: Low Risk     Difficulty of Paying Living Expenses: Not hard at all   Food Insecurity: No Food Insecurity    Worried About [facility] in the Last Year: Never true    Ran Out of Food in the Last Year: Never true   Transportation Needs: Not on file   Physical Activity: Not on file   Stress: Not on file   Social Connections: Not on file   Intimate Partner Violence: Not on file   Housing Stability: Not on file         ROS:  Gen:  Denies fever, chills, headaches. HEENT:  Denies cold symptoms, sore throat. CV:  Denies chest pain or tightness, palpitations. Pulm:  Denies shortness of breath, cough. Abd:  Denies abdominal pain, change in bowel habits. I have reviewed the patient's medical history in detail and updated the computerized patient record as appropriate. OBJECTIVE:  BP (!) 152/84 (Site: Right Upper Arm, Position: Sitting, Cuff Size: Medium Adult)   Pulse 69   Ht 5' 9\" (1.753 m)   Wt 182 lb (82.6 kg)   SpO2 98%   BMI 26.88 kg/m²   GEN:  WDWN, NAD  HEENT:  NCAT, TM/OP nl, PERRL, EOMI. NECK:  Supple without adenopathy. CV:  Regular rate and rhythm, S1 and S2 normal, no murmurs, clicks, gallops or rubs. No edema. PULM:  Chest is clear, no wheezing or rales. Normal symmetric air entry throughout both lung fields. ABD: soft, Non-tender, non-distended, normal bowel sounds. No organomegaly     EKG: normal sinus rhythm, unchanged from previous tracings. ASSESSMENT/PLAN:  1. Type 2 diabetes mellitus with diabetic polyneuropathy, without long-term current use of insulin (HCC)  Uncertain control  Continue metformin  Check A1C  - Hemoglobin A1C; Future  - metFORMIN (GLUCOPHAGE) 1000 MG tablet; TAKE ONE TABLET BY MOUTH TWICE A DAY WITH A MEAL  Dispense: 180 tablet; Refill: 1    2. Essential hypertension  Uncontrolled  Will increase BB to BID dosing and add back CCB. Continue ACEI as well  Watch BP at home  - CBC with Auto Differential; Future  - Comprehensive Metabolic Panel; Future  - TSH with Reflex; Future  - amLODIPine (NORVASC) 5 MG tablet; Take 1 tablet by mouth daily  Dispense: 90 tablet;  Refill: 1  - lisinopril (PRINIVIL;ZESTRIL) 20 MG tablet; TAKE ONE TABLET BY MOUTH DAILY  Dispense: 90 tablet; Refill: 1  - Metoprolol Tartrate 37.5 MG TABS; Take 37.5 mg by mouth 2 times daily  Dispense: 180 tablet; Refill: 1    3. Hypercholesteremia  Continue statin  Check lipid panel  - Lipid Panel; Future  - rosuvastatin (CRESTOR) 40 MG tablet; Take 1 tablet by mouth nightly  Dispense: 90 tablet; Refill: 1    4. Healthcare maintenance  - PSA, Prostatic Specific Antigen; Future    5. Chest pain, unspecified type  EKG unchanged  Recommend he follow up with his cardiologist ASAP given his significant cardiac hx and symptoms. Restart ASA daily   - EKG 12 lead    6. History of coronary artery disease  As above    7. Nocturia  Stable. Continue flomax  - tamsulosin (FLOMAX) 0.4 MG capsule; TAKE TWO CAPSULES BY MOUTH DAILY  Dispense: 180 capsule; Refill: 1    8. Persistent atrial fibrillation (HCC)  Stopped coumadin on his own  Will restart and refer for monitoring   - warfarin (COUMADIN) 1 MG tablet; Take 3 tablets by mouth daily  Dispense: 60 tablet; Refill: 1  - [facility]) Medication Mgmt (Anticoagulation) - [place]    9. S/P coronary artery bypass graft x 4  - warfarin (COUMADIN) 1 MG tablet; Take 3 tablets by mouth daily  Dispense: 60 tablet; Refill: 1    10. Medical non-compliance      Discussed the importance of a low carb diet with regular cardiovascular exercise. Patient was given educational handouts regarding proper low carb/low calorie diet.

## 2022-10-04 ENCOUNTER — TELEPHONE (OUTPATIENT)
Dept: PHARMACY | Age: 62
End: 2022-10-04

## 2022-10-04 NOTE — TELEPHONE ENCOUNTER
Sent message to Dr. Jennifer Leslie office to notify that patient does not plan on coming in to clinic and does not plan on resuming warfarin. Pt was d/c from clinic hx for same reason.     Will close referral.

## 2022-10-04 NOTE — TELEPHONE ENCOUNTER
Called and spoke to patient. Explained referral received from PCP office that pt is resuming warfarin and needs INR check. Pt states that he will let clinic know if he resumes warfarin. Has not resumed warfarin. Then stated that he did not plan on resuming warfarin at all. Advised for pt to contact Dr. Courtney Valente office to notify that not resuming warfarin as instructed.

## 2022-10-25 ENCOUNTER — TELEPHONE (OUTPATIENT)
Dept: FAMILY MEDICINE CLINIC | Age: 62
End: 2022-10-25

## 2022-10-25 NOTE — TELEPHONE ENCOUNTER
----- Message from Nic De Souza MD sent at 10/5/2022  9:59 AM EDT -----  Regarding: FW: Anticoagulation Referral  Please have pt follow up with cardiology to discuss      ----- Message -----  From: Esau Fajardo [facility]  Sent: 10/4/2022  10:51 AM EDT  To: Nic De Souza MD, #  Subject: Anticoagulation Referral                         The clinic called and spoke to the patient after receiving the referral for anticoagulation monitoring. He states that he has not resumed the warfarin and does not plan to resume. He said he will call the clinic if he decides to resume but likely will not. We advised the patient to call your office to notify that he is not resuming warfarin in order to determine alternate plan. The patient was previously a patient of the clinic and was discharged for the same reason due to stopping the warfarin on his own. He does not wish to take the warfarin. We will close the referral for the anticoagulation clinic since the patient is not taking warfarin. Thank you,  Esau Fajardo Pharm. [facility]  [phone]

## 2022-10-30 NOTE — FLOWSHEET NOTE
Echo done at bedside 63 M with HTN, DM, anemia, ESRD on HD ( T/T/sat at [facility] HD center, Nephro: Dr. Huff), GERD, presents today for LHC following an abnormal NST. Patient is waiting for Renal transplant, seen by Dr. Johnson for cardiac clearance and referred  for LHC. Pt denies chest pain, SOB, palpitations/ syncope. EF 72 from TTE. s/p 10/28-prox Cx (80%) x 1 NAHUM Staged PCI of LAD Monday. Nephrology consulted for HD.     A/P    ESRD on HD   TTS   from [place] [facility]    last HD 10/29  renal diet   Going to cath tomorrow and CT wants HD after Cath.   HD consent in the HD unit     HTN   Optimal  titrate up hydralazine if BP remains elevated   monitor BP closely     Anemia   at goal for ESRD  monitor H/H    CKD;MBD   monitor Po4, Ca

## 2023-02-22 ENCOUNTER — OFFICE VISIT (OUTPATIENT)
Dept: FAMILY MEDICINE CLINIC | Age: 63
End: 2023-02-22
Payer: COMMERCIAL

## 2023-02-22 VITALS
HEIGHT: 69 IN | WEIGHT: 182 LBS | SYSTOLIC BLOOD PRESSURE: 176 MMHG | DIASTOLIC BLOOD PRESSURE: 84 MMHG | HEART RATE: 64 BPM | BODY MASS INDEX: 26.96 KG/M2 | OXYGEN SATURATION: 97 %

## 2023-02-22 DIAGNOSIS — E78.00 HYPERCHOLESTEREMIA: ICD-10-CM

## 2023-02-22 DIAGNOSIS — Z91.199 MEDICAL NON-COMPLIANCE: ICD-10-CM

## 2023-02-22 DIAGNOSIS — E11.42 TYPE 2 DIABETES MELLITUS WITH DIABETIC POLYNEUROPATHY, WITHOUT LONG-TERM CURRENT USE OF INSULIN (HCC): Primary | ICD-10-CM

## 2023-02-22 DIAGNOSIS — I71.23 ANEURYSM OF DESCENDING THORACIC AORTA WITHOUT RUPTURE: ICD-10-CM

## 2023-02-22 DIAGNOSIS — Z86.79 HISTORY OF CORONARY ARTERY DISEASE: ICD-10-CM

## 2023-02-22 DIAGNOSIS — I10 ESSENTIAL HYPERTENSION: ICD-10-CM

## 2023-02-22 DIAGNOSIS — I48.19 PERSISTENT ATRIAL FIBRILLATION (HCC): ICD-10-CM

## 2023-02-22 DIAGNOSIS — Z12.5 PROSTATE CANCER SCREENING: ICD-10-CM

## 2023-02-22 DIAGNOSIS — Z95.1 S/P CORONARY ARTERY BYPASS GRAFT X 4: ICD-10-CM

## 2023-02-22 PROCEDURE — 3079F DIAST BP 80-89 MM HG: CPT | Performed by: FAMILY MEDICINE

## 2023-02-22 PROCEDURE — 3077F SYST BP >= 140 MM HG: CPT | Performed by: FAMILY MEDICINE

## 2023-02-22 PROCEDURE — 99214 OFFICE O/P EST MOD 30 MIN: CPT | Performed by: FAMILY MEDICINE

## 2023-02-22 RX ORDER — AMLODIPINE BESYLATE 10 MG/1
10 TABLET ORAL DAILY
Qty: 30 TABLET | Refills: 1 | Status: SHIPPED | OUTPATIENT
Start: 2023-02-22

## 2023-02-22 RX ORDER — ROSUVASTATIN CALCIUM 40 MG/1
40 TABLET, COATED ORAL NIGHTLY
Qty: 30 TABLET | Refills: 1 | Status: SHIPPED | OUTPATIENT
Start: 2023-02-22

## 2023-02-22 ASSESSMENT — PATIENT HEALTH QUESTIONNAIRE - PHQ9
1. LITTLE INTEREST OR PLEASURE IN DOING THINGS: 0
2. FEELING DOWN, DEPRESSED OR HOPELESS: 0
SUM OF ALL RESPONSES TO PHQ QUESTIONS 1-9: 0
SUM OF ALL RESPONSES TO PHQ9 QUESTIONS 1 & 2: 0
SUM OF ALL RESPONSES TO PHQ QUESTIONS 1-9: 0

## 2023-02-22 NOTE — PROGRESS NOTES
Jacqueline Joy is a 57 y.o. male. HPI:  Diabetes Mellitus Type II, Follow-up--   Lab Results   Component Value Date    LABA1C 6.7 05/13/2021      Checks sugars at home:Yes. fasting range: 70s . Last Eye Exam was over a year ago. Pt is on ACEI or ARB. Pt denies foot ulcerations and visual disturbances. pt does adhere to a low carb diet. HTN--Pt seen here for follow up regarding HTN. BP checks at home:Yes  BP readings have not been stable. Pt denies chest pain, palpitations, and peripheral edema. Pt complains of none. Tolerating medications: Yes. Pt does exercise regularly and does adhere to a low salt diet. Has not been taking norvasc at all. Is taking his lisinopril and metoprolol. Hyperlipidemia:    Lab Results   Component Value Date    LDLCALC 113 (H) 05/22/2021   . He is not currently taking his statin medication. Pt is  following Lifestyle modification including Low fat/low cholesterol diet, low carbohydrate diet, and does exercise regularly. Hx thoracic aorta aneurysm- used to follow with vascular surgery. Last CT done in May 2021 showed increase from 4.3cm to 4.8cm. he was to have this redone in 6 months which he never scheduled. At last visit 5m ago was having chest pain. This has resolved. Had not been taking coumadin, was not sure why. Also had stopped aspirin. He was insructed to restart the aspirin which he did. He was instructed to restart coumadin which he did not do. Today he states he doesn't want to take too many pills. He also has since stopped his crestor. He has not seen his cardiologist in 1.5 years.        Current Outpatient Medications   Medication Sig Dispense Refill    lisinopril (PRINIVIL;ZESTRIL) 20 MG tablet TAKE ONE TABLET BY MOUTH DAILY 90 tablet 1    metFORMIN (GLUCOPHAGE) 1000 MG tablet TAKE ONE TABLET BY MOUTH TWICE A DAY WITH A MEAL 180 tablet 1    Metoprolol Tartrate 37.5 MG TABS Take 37.5 mg by mouth 2 times daily 180 tablet 1    tamsulosin (FLOMAX) 0.4 MG capsule TAKE TWO CAPSULES BY MOUTH DAILY 180 capsule 1    glucose monitoring (FREESTYLE FREEDOM) kit 1 kit by Does not apply route daily 1 kit 0    Lancets MISC Use as directed 100 each 3    blood glucose monitor strips Test 1 times a day & as needed for symptoms of irregular blood glucose. 100 strip 3    acetaminophen (TYLENOL) 500 MG tablet Take 2 tablets by mouth every 6 hours 120 tablet 3    aspirin 81 MG tablet Take 81 mg by mouth daily      amLODIPine (NORVASC) 5 MG tablet Take 1 tablet by mouth daily (Patient not taking: Reported on 2/22/2023) 90 tablet 1    rosuvastatin (CRESTOR) 40 MG tablet Take 1 tablet by mouth nightly (Patient not taking: Reported on 2/22/2023) 90 tablet 1    warfarin (COUMADIN) 1 MG tablet Take 3 tablets by mouth daily (Patient not taking: Reported on 2/22/2023) 60 tablet 1     No current facility-administered medications for this visit. Health Maintenance   Topic Date Due    Diabetic retinal exam  Never done    Shingles vaccine (1 of 2) Never done    Diabetic Alb to Cr ratio (uACR) test  08/12/2021    COVID-19 Vaccine (4 - Booster for Moderna series) 02/16/2022    A1C test (Diabetic or Prediabetic)  05/13/2022    Lipids  05/22/2022    GFR test (Diabetes, CKD 3-4, OR last GFR 15-59)  06/02/2022    Flu vaccine (1) Never done    Diabetic foot exam  08/09/2022    Depression Screen  03/14/2023    DTaP/Tdap/Td vaccine (2 - Td or Tdap) 07/14/2027    Colorectal Cancer Screen  10/12/2027    Pneumococcal 0-64 years Vaccine  Completed    Hepatitis A vaccine  Aged Out    Hib vaccine  Aged Out    Meningococcal (ACWY) vaccine  Aged Out    Hepatitis C screen  Discontinued    HIV screen  Discontinued       I have reviewed the patient's medical/surgical/family/social in detail and updated the computerized patient record as appropriate.     Past Medical History:   Diagnosis Date    AAA (abdominal aortic aneurysm) ([facility] 11/28/2015    4.3 cm 9/2019. yearly CT    Aneurysm of descending thoracic aorta ([facility] 2018    2018: 3 cm    Arthritis     A[facility] ([facility] 2018    extensive per CT 2018    Back pain     Coronary artery disease involving native coronary artery of native heart with unstable angina pectoris (HCC)     Diabetes mellitus ([facility]     Diverticulosis     Elevated PSA 10/19/2017    Hyperlipidemia     Hypertension     Polyneuropathy associated with underlying disease ([facility] 10/19/2017     Past Surgical History:   Procedure Laterality Date    CARDIAC CATHETERIZATION      Patient staes normal    CORONARY ARTERY BYPASS GRAFT N/A 2021    URGENT CABG CORONARY ARTERY BYPASS GRAFTING X 4, CISNEROS GRAFT TO LAD X 1, SEQUENTIAL VEIN GRAFT TO OM1 AND RPDA, VEIN GRAFT TO DIAGONAL WITH PROXIMAL ANASTIMOSIS END TO SIDE TO VEIN GRAFT TO OM1 AND RPDA, LIGATION SANDRO WITH 50 MM ATRICLIP, EVH LEFT LEG, TOTAL CPB, REJI, DOPPLER BYPASS GRAFT FLOW VERIFICATION, INSERTION OF VENTRICULAR PACING WIRES, BILATERAL INTERCOSTAL NERVE BLOCK WITH EXPAREL AND MODESTO    CYSTOSCOPY  2021    CYSTOSCOPY, URETHRAL DILATION, INSERTION OF URETHRAL CATHETER performed by Phuong Goldman MD at [address] [place] [address]  11/30/15    with gastric biopsy    UPPER GASTROINTESTINAL ENDOSCOPY N/A 10/12/2018    EGD BIOPSY performed by Keith Liu MD at [facility]  2017, 2017    Dr. Hang Polk     No family history on file.   Social History     Socioeconomic History    Marital status:      Spouse name: Not on file    Number of children: Not on file    Years of education: Not on file    Highest education level: Not on file   Occupational History    Not on file   Tobacco Use    Smoking status: Former     Packs/day: 1.00     Years: 20.00     Pack years: 20.00     Types: Cigarettes     Quit date: 2009     Years since quittin.2    Smokeless tobacco: Never   Substance and Sexual Activity    Alcohol use: No    Drug use: No    Sexual activity: Not on file Other Topics Concern    Not on file   Social History Narrative    Not on file     Social Determinants of Health     Financial Resource Strain: Low Risk     Difficulty of Paying Living Expenses: Not hard at all   Food Insecurity: No Food Insecurity    Worried About Running Out of Food in the Last Year: Never true    Ran Out of Food in the Last Year: Never true   Transportation Needs: Not on file   Physical Activity: Not on file   Stress: Not on file   Social Connections: Not on file   Intimate Partner Violence: Not on file   Housing Stability: Not on file         ROS:  Gen:  Denies fever, chills, headaches. HEENT:  Denies cold symptoms, sore throat. CV:  Denies chest pain or tightness, palpitations. Pulm:  Denies shortness of breath, cough. Abd:  Denies abdominal pain, change in bowel habits. I have reviewed the patient's medical history in detail and updated the computerized patient record as appropriate. OBJECTIVE:  BP (!) 176/84 (Site: Right Upper Arm, Position: Sitting, Cuff Size: Medium Adult)   Pulse 64   Ht 5' 9\" (1.753 m)   Wt 182 lb (82.6 kg)   SpO2 97%   BMI 26.88 kg/m²   GEN:  WDWN, NAD  HEENT:  NCAT, PERRL, EOMI. NECK:  Supple without adenopathy. CV:  Regular rate and rhythm, S1 and S2 normal, no murmurs, clicks, gallops or rubs. No edema. PULM:  Chest is clear, no wheezing or rales. Normal symmetric air entry throughout both lung fields. ABD: soft, Non-tender, non-distended, normal bowel sounds. No organomegaly     ASSESSMENT/PLAN:  1. Type 2 diabetes mellitus with diabetic polyneuropathy, without long-term current use of insulin (HCC)  Uncertain control  Is taking metformin per pt report. Will continue  Check A1C  - MICROALBUMIN / CREATININE URINE RATIO; Future  -  DIABETES FOOT EXAM  - CBC with Auto Differential; Future  - Comprehensive Metabolic Panel; Future  - Hemoglobin A1C; Future    2. Essential hypertension  Uncontrolled  Has not been taking norvasc.  Will add back at 10mg daily   Continue BB/ACEI at current doses  - CBC with Auto Differential; Future  - Comprehensive Metabolic Panel; Future  - amLODIPine (NORVASC) 10 MG tablet; Take 1 tablet by mouth daily  Dispense: 30 tablet; Refill: 1    3. Hypercholesteremia  Not taking crestor, will restart. Needs lipid panel drawn  - Lipid Panel; Future  - rosuvastatin (CRESTOR) 40 MG tablet; Take 1 tablet by mouth nightly  Dispense: 30 tablet; Refill: 1    4. Persistent atrial fibrillation (HCC)  In NSR today  Not on coumadin, does not want to restart and have to monitor INR regularly  Will start xarelto  Also instructed to follow up with cardiology asap   - rivaroxaban (XARELTO) 20 MG TABS tablet; Take 1 tablet by mouth Daily with supper  Dispense: 30 tablet; Refill: 1    5. History of coronary artery disease  As above     6. S/P coronary artery bypass graft x 4  As above     7. Aneurysm of descending thoracic aorta without rupture  Way overdue for repeat imaging  Order placed again today  Discussed importance of this with pt   - CTA CHEST ABDOMEN PELVIS W CONTRAST; Future    8. Prostate cancer screening  - PSA, Prostatic Specific Antigen; Future    9. Medical non-compliance  Addressed with pt in detail  Written instructions, AVS, med list and changes given to pt today    Will follow up in 1m, sooner if needed     Discussed the importance of a low carb diet with regular cardiovascular exercise. Patient was given educational handouts regarding proper low carb/low calorie diet.

## 2023-04-24 DIAGNOSIS — I10 ESSENTIAL HYPERTENSION: ICD-10-CM

## 2023-04-24 RX ORDER — LISINOPRIL 20 MG/1
TABLET ORAL
Qty: 90 TABLET | Refills: 1 | Status: SHIPPED | OUTPATIENT
Start: 2023-04-24

## 2023-04-24 NOTE — TELEPHONE ENCOUNTER
Medication:   Requested Prescriptions     Pending Prescriptions Disp Refills    lisinopril (PRINIVIL;ZESTRIL) 20 MG tablet [Pharmacy Med Name: LISINOPRIL 20 MG TABLET] 90 tablet 1     Sig: TAKE ONE TABLET BY MOUTH DAILY       Last Filled:  10/03/2022 #90 1rf    Patient Phone Number: [phone] (home) [phone] (work)    Last appt: 2/22/2023   Next appt: Visit date not found    Lab Results   Component Value Date     06/02/2021    K 4.0 06/02/2021    CL 96 (L) 06/02/2021    CO2 29 06/02/2021    BUN 28 (H) 06/02/2021    CREATININE 1.1 06/02/2021    GLUCOSE 102 (H) 06/02/2021    CALCIUM 8.8 06/02/2021    PROT 6.2 (L) 05/13/2021    LABALBU 3.9 05/13/2021    BILITOT <0.2 05/13/2021    ALKPHOS 61 05/13/2021    AST 14 (L) 05/13/2021    ALT 12 05/13/2021    LABGLOM >60 06/02/2021    GFRAA >60 06/02/2021    AGRATIO 1.7 05/13/2021    GLOB 2.3 05/13/2021

## 2023-04-27 DIAGNOSIS — E11.42 TYPE 2 DIABETES MELLITUS WITH DIABETIC POLYNEUROPATHY, WITHOUT LONG-TERM CURRENT USE OF INSULIN (HCC): ICD-10-CM

## 2023-04-27 DIAGNOSIS — R35.1 NOCTURIA: ICD-10-CM

## 2023-04-28 RX ORDER — TAMSULOSIN HYDROCHLORIDE 0.4 MG/1
CAPSULE ORAL
Qty: 30 CAPSULE | Refills: 0 | Status: SHIPPED | OUTPATIENT
Start: 2023-04-28

## 2023-05-01 DIAGNOSIS — E11.42 TYPE 2 DIABETES MELLITUS WITH DIABETIC POLYNEUROPATHY, WITHOUT LONG-TERM CURRENT USE OF INSULIN (HCC): ICD-10-CM

## 2023-05-01 DIAGNOSIS — R35.1 NOCTURIA: ICD-10-CM

## 2023-05-01 NOTE — TELEPHONE ENCOUNTER
Medication: requesting new pharmacy    Requested Prescriptions     Pending Prescriptions Disp Refills    metFORMIN (GLUCOPHAGE) 1000 MG tablet 30 tablet 0     Sig: TAKE ONE TABLET BY MOUTH TWICE A DAY WITH MEALS        Last Filled:      Patient Phone Number: [phone] (home) [phone] (work)    Last appt: 2/22/2023   Next appt: Visit date not found    Last OARRS: No flowsheet data found.

## 2023-06-27 ENCOUNTER — TELEPHONE (OUTPATIENT)
Dept: FAMILY MEDICINE CLINIC | Age: 63
End: 2023-06-27

## 2023-06-27 DIAGNOSIS — I10 ESSENTIAL HYPERTENSION: ICD-10-CM

## 2023-06-27 DIAGNOSIS — R35.1 NOCTURIA: ICD-10-CM

## 2023-06-27 DIAGNOSIS — E11.42 TYPE 2 DIABETES MELLITUS WITH DIABETIC POLYNEUROPATHY, WITHOUT LONG-TERM CURRENT USE OF INSULIN (HCC): ICD-10-CM

## 2023-06-28 RX ORDER — TAMSULOSIN HYDROCHLORIDE 0.4 MG/1
CAPSULE ORAL
Qty: 60 CAPSULE | Refills: 0 | Status: SHIPPED | OUTPATIENT
Start: 2023-06-28

## 2023-06-29 RX ORDER — LISINOPRIL 20 MG/1
20 TABLET ORAL DAILY
Qty: 90 TABLET | Refills: 1 | Status: SHIPPED | OUTPATIENT
Start: 2023-06-29

## 2023-06-29 RX ORDER — METOPROLOL TARTRATE 37.5 MG/1
37.5 TABLET, FILM COATED ORAL 2 TIMES DAILY
Qty: 180 TABLET | Refills: 1 | Status: SHIPPED | OUTPATIENT
Start: 2023-06-29

## 2023-07-20 DIAGNOSIS — I10 ESSENTIAL HYPERTENSION: ICD-10-CM

## 2023-07-20 RX ORDER — METOPROLOL TARTRATE 37.5 MG/1
TABLET, FILM COATED ORAL
Qty: 180 TABLET | Refills: 1 | Status: SHIPPED | OUTPATIENT
Start: 2023-07-20

## 2023-07-20 NOTE — TELEPHONE ENCOUNTER
Medication:   Requested Prescriptions     Pending Prescriptions Disp Refills    Metoprolol Tartrate 37.5 MG TABS [Pharmacy Med Name: METOPROLOL TARTRATE 37.5 MG TB] 180 tablet 1     Sig: TAKE ONE TABLET BY MOUTH TWICE A DAY       Last Filled:  06/29/2023 #180 1rf    Patient Phone Number: [phone] (home) [phone] (work)    Last appt: 2/22/2023   Next appt: 8/17/2023    Lab Results   Component Value Date     06/02/2021    K 4.0 06/02/2021    CL 96 (L) 06/02/2021    CO2 29 06/02/2021    BUN 28 (H) 06/02/2021    CREATININE 1.1 06/02/2021    GLUCOSE 102 (H) 06/02/2021    CALCIUM 8.8 06/02/2021    PROT 6.2 (L) 05/13/2021    LABALBU 3.9 05/13/2021    BILITOT <0.2 05/13/2021    ALKPHOS 61 05/13/2021    AST 14 (L) 05/13/2021    ALT 12 05/13/2021    LABGLOM >60 06/02/2021    GFRAA >60 06/02/2021    AGRATIO 1.7 05/13/2021    GLOB 2.3 05/13/2021

## 2023-08-17 ENCOUNTER — OFFICE VISIT (OUTPATIENT)
Dept: FAMILY MEDICINE CLINIC | Age: 63
End: 2023-08-17

## 2023-08-17 VITALS
WEIGHT: 183 LBS | HEIGHT: 69 IN | DIASTOLIC BLOOD PRESSURE: 98 MMHG | HEART RATE: 67 BPM | OXYGEN SATURATION: 98 % | SYSTOLIC BLOOD PRESSURE: 184 MMHG | BODY MASS INDEX: 27.11 KG/M2

## 2023-08-17 DIAGNOSIS — M15.9 PRIMARY OSTEOARTHRITIS INVOLVING MULTIPLE JOINTS: ICD-10-CM

## 2023-08-17 DIAGNOSIS — I48.19 PERSISTENT ATRIAL FIBRILLATION (HCC): ICD-10-CM

## 2023-08-17 DIAGNOSIS — Z86.79 HISTORY OF CORONARY ARTERY DISEASE: ICD-10-CM

## 2023-08-17 DIAGNOSIS — G63 POLYNEUROPATHY ASSOCIATED WITH UNDERLYING DISEASE (HCC): ICD-10-CM

## 2023-08-17 DIAGNOSIS — Z00.00 ANNUAL PHYSICAL EXAM: Primary | ICD-10-CM

## 2023-08-17 DIAGNOSIS — I10 ESSENTIAL HYPERTENSION: ICD-10-CM

## 2023-08-17 DIAGNOSIS — E11.42 TYPE 2 DIABETES MELLITUS WITH DIABETIC POLYNEUROPATHY, WITHOUT LONG-TERM CURRENT USE OF INSULIN (HCC): ICD-10-CM

## 2023-08-17 DIAGNOSIS — Z95.1 S/P CORONARY ARTERY BYPASS GRAFT X 4: ICD-10-CM

## 2023-08-17 DIAGNOSIS — I71.23 ANEURYSM OF DESCENDING THORACIC AORTA WITHOUT RUPTURE (HCC): ICD-10-CM

## 2023-08-17 DIAGNOSIS — E78.00 HYPERCHOLESTEREMIA: ICD-10-CM

## 2023-08-17 PROBLEM — M15.0 PRIMARY OSTEOARTHRITIS INVOLVING MULTIPLE JOINTS: Status: ACTIVE | Noted: 2023-08-17

## 2023-08-17 LAB — HBA1C MFR BLD: 6.2 %

## 2023-08-17 RX ORDER — TRAMADOL HYDROCHLORIDE 50 MG/1
50 TABLET ORAL EVERY 8 HOURS PRN
Qty: 90 TABLET | Refills: 0 | Status: SHIPPED | OUTPATIENT
Start: 2023-08-17 | End: 2023-09-16

## 2023-08-17 RX ORDER — AMLODIPINE BESYLATE 10 MG/1
10 TABLET ORAL DAILY
Qty: 90 TABLET | Refills: 1 | Status: SHIPPED | OUTPATIENT
Start: 2023-08-17

## 2023-08-17 RX ORDER — LISINOPRIL 20 MG/1
20 TABLET ORAL DAILY
Qty: 90 TABLET | Refills: 1 | Status: SHIPPED | OUTPATIENT
Start: 2023-08-17

## 2023-08-17 RX ORDER — LIDOCAINE 50 MG/G
1 PATCH TOPICAL DAILY
Qty: 30 PATCH | Refills: 2 | Status: SHIPPED | OUTPATIENT
Start: 2023-08-17 | End: 2023-11-15

## 2023-08-17 RX ORDER — METOPROLOL TARTRATE 37.5 MG/1
1 TABLET, FILM COATED ORAL 2 TIMES DAILY
Qty: 180 TABLET | Refills: 1 | Status: SHIPPED | OUTPATIENT
Start: 2023-08-17

## 2023-08-17 RX ORDER — AMLODIPINE BESYLATE 10 MG/1
10 TABLET ORAL DAILY
Qty: 30 TABLET | Refills: 5 | Status: SHIPPED | OUTPATIENT
Start: 2023-08-17 | End: 2023-08-17 | Stop reason: SDUPTHER

## 2023-08-17 NOTE — PROGRESS NOTES
SUBJECTIVE:   Yanira Main is a 70 y.o. male presenting for his annual checkup. HPI:   BP at home has been 984-557M systolic. Is taking metoprolol 37.5mg BID and lisinopil 20mg daily. Has not been taking amlodipine. Does exercise regularly. Hx thoracic aorta aneurysm- used to follow with vascular surgery but has not since June 2021. Last CT done in May 2021 showed increase from 4.3cm to 4.8cm. he was to have this redone in 6 months which he never scheduled. Hx Afib- has not been taking any blood thinner. Does not want to take coumadin due to INR checks. Does not want to see his cardiologist- feels they do not do anything for him. Was put on xarelto at last visit, did not start taking it. Also did not have cardiac event monitor done as recommended by cardiology to see if needed anticoagulation any more. C/o arthritis - was seeing rheumatology who has since retired. Is taking lidocaine patch along with Tramadol 50mg TID which helps. Requests I take over prescribing these.       Patient Active Problem List   Diagnosis    Type 2 diabetes mellitus with diabetic polyneuropathy, without long-term current use of insulin ([address])    Essential hypertension    Hypercholesteremia    Former smoker    Medical non-compliance    Diverticulosis    Polyneuropathy associated with underlying disease ([address])    Aneurysm of descending thoracic aorta (HCC)    Atherosclerosis of aorta (HCC)    Persistent atrial fibrillation (HCC)    Unstable angina (HCC)    S/P coronary artery bypass graft x 4    History of coronary artery disease       Past Medical History:   Diagnosis Date    AAA (abdominal aortic aneurysm) ([address]) 11/28/2015    4.3 cm 9/2019. yearly CT    Aneurysm of descending thoracic aorta ([address]) 03/28/2018    2018: 3 cm    Arthritis     Atherosclerosis of aorta ([address]) 03/28/2018    extensive per CT 2018    Back pain     Coronary artery disease involving native coronary artery of native heart with unstable angina pectoris ([address])

## 2023-08-23 ENCOUNTER — TELEPHONE (OUTPATIENT)
Dept: FAMILY MEDICINE CLINIC | Age: 63
End: 2023-08-23

## 2023-08-23 NOTE — TELEPHONE ENCOUNTER
Patient requesting XR order as discussed at previous appointment. He is wanting to go to [facility], but would like to  orders at our office. Patient was informed Franklyn PATEL would have orders due to sharing same system, however patient would still like to  orders. Please return call once order placed.

## 2023-09-12 ENCOUNTER — HOSPITAL ENCOUNTER (OUTPATIENT)
Dept: CT IMAGING | Age: 63
Discharge: HOME | End: 2023-09-12
Attending: FAMILY MEDICINE
Payer: MEDICARE

## 2023-09-12 DIAGNOSIS — I71.23 ANEURYSM OF DESCENDING THORACIC AORTA WITHOUT RUPTURE (HCC): ICD-10-CM

## 2023-09-12 LAB
CREAT SERPL-MCNC: 0.9 MG/DL (ref 0.8–1.3)
GFR SERPLBLD CREATININE-BSD FMLA CKD-EPI: >60 ML/MIN/{1.73_M2}

## 2023-09-12 PROCEDURE — 74174 CTA ABD&PLVS W/CONTRAST: CPT

## 2023-09-12 PROCEDURE — 36415 COLL VENOUS BLD VENIPUNCTURE: CPT

## 2023-09-12 PROCEDURE — 6360000004 HC RX CONTRAST MEDICATION: Performed by: FAMILY MEDICINE

## 2023-09-12 PROCEDURE — 82565 ASSAY OF CREATININE: CPT

## 2023-09-12 PROCEDURE — 71275 CT ANGIOGRAPHY CHEST: CPT

## 2023-09-12 RX ADMIN — IOPAMIDOL 75 ML: 755 INJECTION, SOLUTION INTRAVENOUS at 16:41

## 2023-09-13 DIAGNOSIS — M15.9 PRIMARY OSTEOARTHRITIS INVOLVING MULTIPLE JOINTS: ICD-10-CM

## 2023-09-13 RX ORDER — TRAMADOL HYDROCHLORIDE 50 MG/1
TABLET ORAL
Qty: 90 TABLET | Refills: 0 | Status: SHIPPED | OUTPATIENT
Start: 2023-09-13 | End: 2023-10-13

## 2023-09-14 ENCOUNTER — OFFICE VISIT (OUTPATIENT)
Dept: FAMILY MEDICINE CLINIC | Age: 63
End: 2023-09-14
Payer: MEDICARE

## 2023-09-14 VITALS
WEIGHT: 191 LBS | BODY MASS INDEX: 28.29 KG/M2 | DIASTOLIC BLOOD PRESSURE: 88 MMHG | HEART RATE: 67 BPM | SYSTOLIC BLOOD PRESSURE: 166 MMHG | HEIGHT: 69 IN | OXYGEN SATURATION: 97 %

## 2023-09-14 DIAGNOSIS — M15.9 PRIMARY OSTEOARTHRITIS INVOLVING MULTIPLE JOINTS: ICD-10-CM

## 2023-09-14 DIAGNOSIS — R35.1 NOCTURIA: ICD-10-CM

## 2023-09-14 DIAGNOSIS — I71.23 ANEURYSM OF DESCENDING THORACIC AORTA WITHOUT RUPTURE (HCC): ICD-10-CM

## 2023-09-14 DIAGNOSIS — I10 ESSENTIAL HYPERTENSION: Primary | ICD-10-CM

## 2023-09-14 PROCEDURE — 1123F ACP DISCUSS/DSCN MKR DOCD: CPT | Performed by: FAMILY MEDICINE

## 2023-09-14 PROCEDURE — 99214 OFFICE O/P EST MOD 30 MIN: CPT | Performed by: FAMILY MEDICINE

## 2023-09-14 PROCEDURE — G8427 DOCREV CUR MEDS BY ELIG CLIN: HCPCS | Performed by: FAMILY MEDICINE

## 2023-09-14 PROCEDURE — G8419 CALC BMI OUT NRM PARAM NOF/U: HCPCS | Performed by: FAMILY MEDICINE

## 2023-09-14 PROCEDURE — 3077F SYST BP >= 140 MM HG: CPT | Performed by: FAMILY MEDICINE

## 2023-09-14 PROCEDURE — 4004F PT TOBACCO SCREEN RCVD TLK: CPT | Performed by: FAMILY MEDICINE

## 2023-09-14 PROCEDURE — 3079F DIAST BP 80-89 MM HG: CPT | Performed by: FAMILY MEDICINE

## 2023-09-14 PROCEDURE — 3017F COLORECTAL CA SCREEN DOC REV: CPT | Performed by: FAMILY MEDICINE

## 2023-09-14 RX ORDER — METOPROLOL TARTRATE 50 MG/1
50 TABLET, FILM COATED ORAL 2 TIMES DAILY
Qty: 60 TABLET | Refills: 5 | Status: SHIPPED | OUTPATIENT
Start: 2023-09-14

## 2023-09-14 RX ORDER — LISINOPRIL 40 MG/1
40 TABLET ORAL DAILY
Qty: 30 TABLET | Refills: 5 | Status: SHIPPED | OUTPATIENT
Start: 2023-09-14

## 2023-09-14 RX ORDER — TAMSULOSIN HYDROCHLORIDE 0.4 MG/1
0.8 CAPSULE ORAL DAILY
Qty: 60 CAPSULE | Refills: 5 | Status: SHIPPED | OUTPATIENT
Start: 2023-09-14

## 2023-09-14 NOTE — PROGRESS NOTES
directed 100 each 3    blood glucose monitor strips Test 1 times a day & as needed for symptoms of irregular blood glucose. 100 strip 3    aspirin 81 MG tablet Take 1 tablet by mouth daily       No current facility-administered medications for this visit. Past Medical History:   Diagnosis Date    AAA (abdominal aortic aneurysm) ([address]) 11/28/2015    4.3 cm 9/2019. yearly CT    Aneurysm of descending thoracic aorta ([address]) 03/28/2018    2018: 3 cm    Arthritis     Atherosclerosis of aorta ([address]) 03/28/2018    extensive per CT 2018    Back pain     Coronary artery disease involving native coronary artery of native heart with unstable angina pectoris (HCC)     Diabetes mellitus ([address])     Diverticulosis     Elevated PSA 10/19/2017    Hyperlipidemia     Hypertension     Polyneuropathy associated with underlying disease ([address]) 10/19/2017     Past Surgical History:   Procedure Laterality Date    CARDIAC CATHETERIZATION      Patient staes normal    CORONARY ARTERY BYPASS GRAFT N/A 5/25/2021    URGENT CABG CORONARY ARTERY BYPASS GRAFTING X 4, CISNEROS GRAFT TO LAD X 1, SEQUENTIAL VEIN GRAFT TO OM1 AND RPDA, VEIN GRAFT TO DIAGONAL WITH PROXIMAL ANASTIMOSIS END TO SIDE TO VEIN GRAFT TO OM1 AND RPDA, LIGATION SANDRO WITH 50 MM ATRICLIP, EVH LEFT LEG, TOTAL CPB, REJI, DOPPLER BYPASS GRAFT FLOW VERIFICATION, INSERTION OF VENTRICULAR PACING WIRES, BILATERAL INTERCOSTAL NERVE BLOCK WITH EXPAREL AND MODESTO    CYSTOSCOPY  5/25/2021    CYSTOSCOPY, URETHRAL DILATION, INSERTION OF URETHRAL CATHETER performed [facility]  11/30/15    with gastric biopsy    UPPER GASTROINTESTINAL ENDOSCOPY N/A 10/12/2018    EGD BIOPSY performed by Franca Schwartz MD at [address] [place] [address]  5/2017, 7/2017    Dr. Lula Gomez     No family history on file.   Social History     Socioeconomic History    Marital status:      Spouse name: Not on file    Number of children: Not on file

## 2023-10-18 DIAGNOSIS — M15.9 PRIMARY OSTEOARTHRITIS INVOLVING MULTIPLE JOINTS: ICD-10-CM

## 2023-10-18 RX ORDER — TRAMADOL HYDROCHLORIDE 50 MG/1
TABLET ORAL
Qty: 90 TABLET | Refills: 0 | Status: SHIPPED | OUTPATIENT
Start: 2023-10-18 | End: 2023-11-17

## 2023-10-18 NOTE — TELEPHONE ENCOUNTER
Medication:   Requested Prescriptions     Pending Prescriptions Disp Refills    traMADol (ULTRAM) 50 MG tablet [Pharmacy Med Name: traMADol HCl Oral Tablet 50 MG] 90 tablet 0     Sig: Take 1 tablet by mouth every 8 hours as needed for pain. Take lowest dose possible to manage pain.  Reduce doses taken as pain becomes manageable        Last Filled:  9/13/23    Patient Phone Number: [phone] (home) [phone] (work)    Last appt: 9/14/2023   Next appt: Visit date not found    Last OARRS:        No data to display

## 2023-10-21 NOTE — PROGRESS NOTES
[facility])   Vascular Surgery Followup    Referring Provider:  Maryjo Adams MD     No chief complaint on file. History of Present Illness:  40-year-old male with history of infrarenal aortic aneurysm presents today for routine follow-up. He was last seen by me in June 2021. He presents today with a recent CT scan September 12, 2023 showing a 5.0 cm fusiform infrarenal aortic aneurysm. Has no complaints today. Past Medical History:   has a past medical history of AAA (abdominal aortic aneurysm) ([address]), Aneurysm of descending thoracic aorta (HCC), Arthritis, Atherosclerosis of aorta ([address]), Back pain, Coronary artery disease involving native coronary artery of native heart with unstable angina pectoris ([address]), Diabetes mellitus ([address]), Diverticulosis, Elevated PSA, Hyperlipidemia, Hypertension, and Polyneuropathy associated with underlying disease ([address] Surgical History:   has a past surgical history that includes Upper gastrointestinal endoscopy (11/30/15); Urethra dilation (5/2017, 7/2017); Cardiac catheterization; Upper gastrointestinal endoscopy (N/A, 10/12/2018); Coronary artery bypass graft (N/A, 5/25/2021); and Cystoscopy (5/25/2021). Social History:   reports that he quit smoking about 13 years ago. His smoking use included cigarettes. He has a 20.00 pack-year smoking history. He has never used smokeless tobacco. He reports that he does not drink alcohol and does not use drugs. Family History:  family history is not on file. Home Medications:  Current Outpatient Medications   Medication Sig Dispense Refill    traMADol (ULTRAM) 50 MG tablet Take 1 tablet by mouth every 8 hours as needed for pain. Take lowest dose possible to manage pain.  Reduce doses taken as pain becomes manageable 90 tablet 0    tamsulosin (FLOMAX) 0.4 MG capsule Take 2 capsules by mouth daily 60 capsule 5    lisinopril (PRINIVIL;ZESTRIL) 40 MG tablet Take 1 tablet by mouth daily 30 tablet 5    metoprolol

## 2023-10-24 ENCOUNTER — OFFICE VISIT (OUTPATIENT)
Dept: VASCULAR SURGERY | Age: 63
End: 2023-10-24
Payer: MEDICARE

## 2023-10-24 VITALS
BODY MASS INDEX: 27.49 KG/M2 | SYSTOLIC BLOOD PRESSURE: 122 MMHG | HEIGHT: 70 IN | WEIGHT: 192 LBS | DIASTOLIC BLOOD PRESSURE: 72 MMHG

## 2023-10-24 DIAGNOSIS — I71.43 INFRARENAL ABDOMINAL AORTIC ANEURYSM (AAA) WITHOUT RUPTURE (HCC): Primary | ICD-10-CM

## 2023-10-24 PROCEDURE — 4004F PT TOBACCO SCREEN RCVD TLK: CPT | Performed by: SURGERY

## 2023-10-24 PROCEDURE — 1123F ACP DISCUSS/DSCN MKR DOCD: CPT | Performed by: SURGERY

## 2023-10-24 PROCEDURE — G8427 DOCREV CUR MEDS BY ELIG CLIN: HCPCS | Performed by: SURGERY

## 2023-10-24 PROCEDURE — G8484 FLU IMMUNIZE NO ADMIN: HCPCS | Performed by: SURGERY

## 2023-10-24 PROCEDURE — 3078F DIAST BP <80 MM HG: CPT | Performed by: SURGERY

## 2023-10-24 PROCEDURE — 3074F SYST BP LT 130 MM HG: CPT | Performed by: SURGERY

## 2023-10-24 PROCEDURE — 3017F COLORECTAL CA SCREEN DOC REV: CPT | Performed by: SURGERY

## 2023-10-24 PROCEDURE — G8419 CALC BMI OUT NRM PARAM NOF/U: HCPCS | Performed by: SURGERY

## 2023-10-24 PROCEDURE — 99213 OFFICE O/P EST LOW 20 MIN: CPT | Performed by: SURGERY

## 2023-12-06 DIAGNOSIS — M15.9 PRIMARY OSTEOARTHRITIS INVOLVING MULTIPLE JOINTS: ICD-10-CM

## 2023-12-06 RX ORDER — TRAMADOL HYDROCHLORIDE 50 MG/1
TABLET ORAL
Qty: 90 TABLET | Refills: 0 | OUTPATIENT
Start: 2023-12-06

## 2023-12-06 NOTE — TELEPHONE ENCOUNTER
Medication:   Requested Prescriptions     Pending Prescriptions Disp Refills    traMADol (ULTRAM) 50 MG tablet [Pharmacy Med Name: traMADol HCl Oral Tablet 50 MG] 90 tablet 0     Sig: TAKE ONE TABLET BY MOUTH EVERY 8 HOURS AS NEEDED FOR PAIN. TAKE LOWEST DOSE POSSIBLE TO MANAGE PAIN. REDUCE DOSES TAKEN AS PAIN BECOMES MANAGEABLE.         Last Filled:      Patient Phone Number: [phone] (home) [phone] (work)    Last appt: 9/14/2023   Next appt: Visit date not found    Last OARRS:        No data to display

## 2023-12-08 ENCOUNTER — TELEMEDICINE (OUTPATIENT)
Dept: FAMILY MEDICINE CLINIC | Age: 63
End: 2023-12-08
Payer: MEDICARE

## 2023-12-08 DIAGNOSIS — M15.9 PRIMARY OSTEOARTHRITIS INVOLVING MULTIPLE JOINTS: ICD-10-CM

## 2023-12-08 DIAGNOSIS — I10 ESSENTIAL HYPERTENSION: Primary | ICD-10-CM

## 2023-12-08 DIAGNOSIS — I71.23 ANEURYSM OF DESCENDING THORACIC AORTA WITHOUT RUPTURE (HCC): ICD-10-CM

## 2023-12-08 PROCEDURE — G8427 DOCREV CUR MEDS BY ELIG CLIN: HCPCS | Performed by: FAMILY MEDICINE

## 2023-12-08 PROCEDURE — 99214 OFFICE O/P EST MOD 30 MIN: CPT | Performed by: FAMILY MEDICINE

## 2023-12-08 PROCEDURE — 1123F ACP DISCUSS/DSCN MKR DOCD: CPT | Performed by: FAMILY MEDICINE

## 2023-12-08 PROCEDURE — 3017F COLORECTAL CA SCREEN DOC REV: CPT | Performed by: FAMILY MEDICINE

## 2023-12-08 RX ORDER — TRAMADOL HYDROCHLORIDE 50 MG/1
50 TABLET ORAL EVERY 8 HOURS PRN
Qty: 90 TABLET | Refills: 0 | Status: SHIPPED | OUTPATIENT
Start: 2023-12-08 | End: 2024-01-07

## 2023-12-08 SDOH — ECONOMIC STABILITY: INCOME INSECURITY: HOW HARD IS IT FOR YOU TO PAY FOR THE VERY BASICS LIKE FOOD, HOUSING, MEDICAL CARE, AND HEATING?: NOT HARD AT ALL

## 2023-12-08 SDOH — ECONOMIC STABILITY: FOOD INSECURITY: WITHIN THE PAST 12 MONTHS, YOU WORRIED THAT YOUR FOOD WOULD RUN OUT BEFORE YOU GOT MONEY TO BUY MORE.: NEVER TRUE

## 2023-12-08 SDOH — ECONOMIC STABILITY: HOUSING INSECURITY
IN THE LAST 12 MONTHS, WAS THERE A TIME WHEN YOU DID NOT HAVE A STEADY PLACE TO SLEEP OR SLEPT IN A SHELTER (INCLUDING NOW)?: NO

## 2023-12-08 SDOH — ECONOMIC STABILITY: FOOD INSECURITY: WITHIN THE PAST 12 MONTHS, THE FOOD YOU BOUGHT JUST DIDN'T LAST AND YOU DIDN'T HAVE MONEY TO GET MORE.: NEVER TRUE

## 2023-12-08 NOTE — PROGRESS NOTES
2023    TELEHEALTH EVALUATION -- Audio/Visual (During [id number] public health emergency)    HPI:    Bang Gibson (:  1960) has requested an audio/video evaluation for the following concern(s):    Follow up hypertension- has been taking lisinopril 40mg, metoprolol 50mg BID and norvasc 10mg daily. BP at home has been stable, running stable. C/o widespread joint pain from OA. Taking tramadol 50mg TID which helps. Has not gotten labs done yet. 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 He was instructed to follow up with his vascular surgeon who recommended repair at this time but pt declined. Plan to follow up CT in 6 months. Review of Systems:  Gen:  Denies fever, chills, headaches. No weight loss  HEENT:  Denies cold symptoms, sore throat. CV:  Denies chest pain or tightness, palpitations. Pulm:  Denies shortness of breath, cough. Abd:  Denies abdominal pain, change in bowel habits. Prior to Visit Medications    Medication Sig Taking?  Authorizing Provider   tamsulosin (FLOMAX) 0.4 MG capsule Take 2 capsules by mouth daily Yes Rafiq Ford MD   lisinopril (PRINIVIL;ZESTRIL) 40 MG tablet Take 1 tablet by mouth daily Yes Rafiq Ford MD   metoprolol tartrate (LOPRESSOR) 50 MG tablet Take 1 tablet by mouth 2 times daily Yes Rafiq Ford MD   rivaroxaban (XARELTO) 20 MG TABS tablet Take 1 tablet by mouth Daily with supper Yes Rafiq Ford MD   amLODIPine (NORVASC) 10 MG tablet Take 1 tablet by mouth daily Yes Rafiq Ford MD   metFORMIN (GLUCOPHAGE) 1000 MG tablet Take 1 tablet by mouth 2 times daily (with meals) with meals Yes Rafiq Ford MD   rosuvastatin (CRESTOR) 40 MG tablet

## 2024-01-10 DIAGNOSIS — I10 ESSENTIAL HYPERTENSION: ICD-10-CM

## 2024-01-10 RX ORDER — LISINOPRIL 40 MG/1
40 TABLET ORAL DAILY
Qty: 30 TABLET | Refills: 5 | Status: SHIPPED | OUTPATIENT
Start: 2024-01-10

## 2024-01-10 NOTE — TELEPHONE ENCOUNTER
Medication:   Requested Prescriptions     Pending Prescriptions Disp Refills    lisinopril (PRINIVIL;ZESTRIL) 40 MG tablet [Pharmacy Med Name: Lisinopril Oral Tablet 40 MG] 30 tablet 0     Sig: TAKE 1 TABLET BY MOUTH DAILY        Last Filled:      Patient Phone Number: [phone] (home) [phone] (work)    Last appt: 12/8/2023   Next appt: Visit date not found    Last OARRS:       12/8/2023    12:34 PM   RX Monitoring   Periodic Controlled Substance Monitoring Possible medication side effects, risk of tolerance/dependence & alternative treatments discussed.;No signs of potential drug abuse or diversion identified.;Assessed functional status (ability to engage in work or other purposeful activities, the pain intensity and its interference with activities of daily living, quality of family life and social activities, and the physical activity);Obtaining appropriate analgesic effect of treatment.

## 2024-01-21 DIAGNOSIS — M15.9 PRIMARY OSTEOARTHRITIS INVOLVING MULTIPLE JOINTS: ICD-10-CM

## 2024-01-22 RX ORDER — TRAMADOL HYDROCHLORIDE 50 MG/1
50 TABLET ORAL EVERY 8 HOURS PRN
Qty: 90 TABLET | Refills: 0 | Status: SHIPPED | OUTPATIENT
Start: 2024-01-22 | End: 2024-02-21

## 2024-01-22 NOTE — TELEPHONE ENCOUNTER
Medication:   Requested Prescriptions     Pending Prescriptions Disp Refills    traMADol (ULTRAM) 50 MG tablet [Pharmacy Med Name: traMADol HCl Oral Tablet 50 MG] 90 tablet 0     Sig: Take 1 tablet by mouth once every 8 hours as needed for pain. Max kate amount 150mg.  reduce dose taken as pain becomes manageable        Last Filled:  12/8/23    Patient Phone Number: [phone] (home) [phone] (work)    Last appt: 12/8/2023   Next appt: Visit date not found    Last OARRS:       12/8/2023    12:34 PM   RX Monitoring   Periodic Controlled Substance Monitoring Possible medication side effects, risk of tolerance/dependence & alternative treatments discussed.;No signs of potential drug abuse or diversion identified.;Assessed functional status (ability to engage in work or other purposeful activities, the pain intensity and its interference with activities of daily living, quality of family life and social activities, and the physical activity);Obtaining appropriate analgesic effect of treatment.

## 2024-03-06 DIAGNOSIS — M15.9 PRIMARY OSTEOARTHRITIS INVOLVING MULTIPLE JOINTS: ICD-10-CM

## 2024-03-06 DIAGNOSIS — I10 ESSENTIAL HYPERTENSION: ICD-10-CM

## 2024-03-06 RX ORDER — TRAMADOL HYDROCHLORIDE 50 MG/1
TABLET ORAL
Qty: 90 TABLET | Refills: 0 | OUTPATIENT
Start: 2024-03-06

## 2024-03-06 RX ORDER — AMLODIPINE BESYLATE 10 MG/1
10 TABLET ORAL DAILY
Qty: 90 TABLET | Refills: 0 | OUTPATIENT
Start: 2024-03-06

## 2024-03-06 NOTE — TELEPHONE ENCOUNTER
Medication:   Requested Prescriptions     Pending Prescriptions Disp Refills    traMADol (ULTRAM) 50 MG tablet [Pharmacy Med Name: traMADol HCl Oral Tablet 50 MG] 90 tablet 0     Sig: TAKE ONE TABLET BY MOUTH EVERY EIGHT HOURS AS NEEDED FOR PAIN, MAX DAILY AMOUNT = 150 MG    amLODIPine (NORVASC) 10 MG tablet [Pharmacy Med Name: amLODIPine Besylate Oral Tablet 10 MG] 90 tablet 0     Sig: TAKE 1 TABLET BY MOUTH DAILY        Last Filled:  8/17/23    Patient Phone Number: [phone] (home) [phone] (work)    Last appt: 12/8/2023   Next appt: Visit date not found    Last OARRS:       12/8/2023    12:34 PM   RX Monitoring   Periodic Controlled Substance Monitoring Possible medication side effects, risk of tolerance/dependence & alternative treatments discussed.;No signs of potential drug abuse or diversion identified.;Assessed functional status (ability to engage in work or other purposeful activities, the pain intensity and its interference with activities of daily living, quality of family life and social activities, and the physical activity);Obtaining appropriate analgesic effect of treatment.

## 2024-03-11 ENCOUNTER — OFFICE VISIT (OUTPATIENT)
Dept: FAMILY MEDICINE CLINIC | Age: 64
End: 2024-03-11
Payer: MEDICARE

## 2024-03-11 VITALS
DIASTOLIC BLOOD PRESSURE: 80 MMHG | HEART RATE: 60 BPM | HEIGHT: 70 IN | WEIGHT: 200 LBS | BODY MASS INDEX: 28.63 KG/M2 | SYSTOLIC BLOOD PRESSURE: 136 MMHG | OXYGEN SATURATION: 98 %

## 2024-03-11 DIAGNOSIS — Z00.00 ANNUAL PHYSICAL EXAM: ICD-10-CM

## 2024-03-11 DIAGNOSIS — I48.19 PERSISTENT ATRIAL FIBRILLATION (HCC): ICD-10-CM

## 2024-03-11 DIAGNOSIS — E11.42 TYPE 2 DIABETES MELLITUS WITH DIABETIC POLYNEUROPATHY, WITHOUT LONG-TERM CURRENT USE OF INSULIN (HCC): ICD-10-CM

## 2024-03-11 DIAGNOSIS — Z00.00 INITIAL MEDICARE ANNUAL WELLNESS VISIT: Primary | ICD-10-CM

## 2024-03-11 DIAGNOSIS — I10 ESSENTIAL HYPERTENSION: ICD-10-CM

## 2024-03-11 DIAGNOSIS — M15.9 PRIMARY OSTEOARTHRITIS INVOLVING MULTIPLE JOINTS: ICD-10-CM

## 2024-03-11 DIAGNOSIS — E78.00 HYPERCHOLESTEREMIA: ICD-10-CM

## 2024-03-11 DIAGNOSIS — I71.23 ANEURYSM OF DESCENDING THORACIC AORTA WITHOUT RUPTURE (HCC): ICD-10-CM

## 2024-03-11 DIAGNOSIS — Z95.1 S/P CORONARY ARTERY BYPASS GRAFT X 4: ICD-10-CM

## 2024-03-11 LAB
ALBUMIN SERPL-MCNC: 4.1 G/DL (ref 3.4–5)
ALBUMIN/GLOB SERPL: 2 {RATIO} (ref 1.1–2.2)
ALP SERPL-CCNC: 67 U/L (ref 40–129)
ALT SERPL-CCNC: 11 U/L (ref 10–40)
ANION GAP SERPL CALCULATED.3IONS-SCNC: 9 MMOL/L (ref 3–16)
AST SERPL-CCNC: 15 U/L (ref 15–37)
BASOPHILS # BLD: 0 K/UL (ref 0–0.2)
BASOPHILS NFR BLD: 0.5 %
BILIRUB SERPL-MCNC: <0.2 MG/DL (ref 0–1)
BUN SERPL-MCNC: 15 MG/DL (ref 7–20)
CALCIUM SERPL-MCNC: 9 MG/DL (ref 8.3–10.6)
CHLORIDE SERPL-SCNC: 107 MMOL/L (ref 99–110)
CHOLEST SERPL-MCNC: 198 MG/DL (ref 0–199)
CO2 SERPL-SCNC: 24 MMOL/L (ref 21–32)
CREAT SERPL-MCNC: 1 MG/DL (ref 0.8–1.3)
DEPRECATED RDW RBC AUTO: 15.6 % (ref 12.4–15.4)
EOSINOPHIL # BLD: 0.1 K/UL (ref 0–0.6)
EOSINOPHIL NFR BLD: 1.6 %
GFR SERPLBLD CREATININE-BSD FMLA CKD-EPI: >60 ML/MIN/{1.73_M2}
GLUCOSE SERPL-MCNC: 109 MG/DL (ref 70–99)
HCT VFR BLD AUTO: 35.2 % (ref 40.5–52.5)
HDLC SERPL-MCNC: 34 MG/DL (ref 40–60)
HGB BLD-MCNC: 11.8 G/DL (ref 13.5–17.5)
LDLC SERPL CALC-MCNC: 132 MG/DL
LYMPHOCYTES # BLD: 2.6 K/UL (ref 1–5.1)
LYMPHOCYTES NFR BLD: 41 %
MCH RBC QN AUTO: 26.7 PG (ref 26–34)
MCHC RBC AUTO-ENTMCNC: 33.5 G/DL (ref 31–36)
MCV RBC AUTO: 79.5 FL (ref 80–100)
MONOCYTES # BLD: 0.5 K/UL (ref 0–1.3)
MONOCYTES NFR BLD: 8.3 %
NEUTROPHILS # BLD: 3 K/UL (ref 1.7–7.7)
NEUTROPHILS NFR BLD: 48.6 %
PLATELET # BLD AUTO: 164 K/UL (ref 135–450)
PMV BLD AUTO: 10.9 FL (ref 5–10.5)
POTASSIUM SERPL-SCNC: 5.2 MMOL/L (ref 3.5–5.1)
PROT SERPL-MCNC: 6.2 G/DL (ref 6.4–8.2)
PSA SERPL DL<=0.01 NG/ML-MCNC: 0.59 NG/ML (ref 0–4)
RBC # BLD AUTO: 4.42 M/UL (ref 4.2–5.9)
SODIUM SERPL-SCNC: 140 MMOL/L (ref 136–145)
TRIGL SERPL-MCNC: 158 MG/DL (ref 0–150)
VLDLC SERPL CALC-MCNC: 32 MG/DL
WBC # BLD AUTO: 6.2 K/UL (ref 4–11)

## 2024-03-11 PROCEDURE — 3046F HEMOGLOBIN A1C LEVEL >9.0%: CPT | Performed by: FAMILY MEDICINE

## 2024-03-11 PROCEDURE — G8484 FLU IMMUNIZE NO ADMIN: HCPCS | Performed by: FAMILY MEDICINE

## 2024-03-11 PROCEDURE — 3075F SYST BP GE 130 - 139MM HG: CPT | Performed by: FAMILY MEDICINE

## 2024-03-11 PROCEDURE — 3017F COLORECTAL CA SCREEN DOC REV: CPT | Performed by: FAMILY MEDICINE

## 2024-03-11 PROCEDURE — G0438 PPPS, INITIAL VISIT: HCPCS | Performed by: FAMILY MEDICINE

## 2024-03-11 PROCEDURE — 1123F ACP DISCUSS/DSCN MKR DOCD: CPT | Performed by: FAMILY MEDICINE

## 2024-03-11 PROCEDURE — 3079F DIAST BP 80-89 MM HG: CPT | Performed by: FAMILY MEDICINE

## 2024-03-11 RX ORDER — TRAMADOL HYDROCHLORIDE 50 MG/1
50 TABLET ORAL EVERY 8 HOURS PRN
Qty: 90 TABLET | Refills: 2 | Status: SHIPPED | OUTPATIENT
Start: 2024-03-11 | End: 2024-06-09

## 2024-03-11 ASSESSMENT — PATIENT HEALTH QUESTIONNAIRE - PHQ9
SUM OF ALL RESPONSES TO PHQ QUESTIONS 1-9: 0
SUM OF ALL RESPONSES TO PHQ QUESTIONS 1-9: 0
1. LITTLE INTEREST OR PLEASURE IN DOING THINGS: 0
SUM OF ALL RESPONSES TO PHQ QUESTIONS 1-9: 0
2. FEELING DOWN, DEPRESSED OR HOPELESS: 0
SUM OF ALL RESPONSES TO PHQ QUESTIONS 1-9: 0
SUM OF ALL RESPONSES TO PHQ9 QUESTIONS 1 & 2: 0

## 2024-03-11 ASSESSMENT — LIFESTYLE VARIABLES
HOW OFTEN DO YOU HAVE A DRINK CONTAINING ALCOHOL: NEVER
HOW MANY STANDARD DRINKS CONTAINING ALCOHOL DO YOU HAVE ON A TYPICAL DAY: PATIENT DOES NOT DRINK

## 2024-03-11 NOTE — PROGRESS NOTES
Medicare Annual Wellness Visit    Gallito Brown is here for Medicare AWV    Assessment & Plan   Initial Medicare annual wellness visit  Type 2 diabetes mellitus with diabetic polyneuropathy, without long-term current use of insulin (HCC)  Stable. Continue current medications   Essential hypertension  Stable. Continue current meds  Hypercholesteremia  Uncontrolled, discussed importance of taking statin daily   S/P coronary artery bypass graft x 4  Persistent atrial fibrillation (HCC)  In NSR today, continue AC  Aneurysm of descending thoracic aorta without rupture (HCC)  Pt reminded to get CT done this month and follow up with Dr. Lozano   Primary osteoarthritis involving multiple joints  -     traMADol (ULTRAM) 50 MG tablet; Take 1 tablet by mouth every 8 hours as needed for Pain for up to 90 days. Max Daily Amount: 150 mg, Disp-90 tablet, R-2Normal    Recommendations for Preventive Services Due: see orders and patient instructions/AVS.  Recommended screening schedule for the next 5-10 years is provided to the patient in written form: see Patient Instructions/AVS.     No follow-ups on file.     Subjective   The following acute and/or chronic problems were also addressed today:    Follow up hyperlipidemia- taking crestor maybe 2x per week.     Follow up hypertension- has been taking lisinopril 40mg, metoprolol 50mg BID and norvasc 10mg daily. BP at home has been stable, running stable.       C/o widespread joint pain from OA. Taking tramadol 50mg TID which helps.     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 He was instructed to follow up with his vascular surgeon who recommended repair at this time but pt declined. Plan to follow up CT in 6 months which is this

## 2024-03-11 NOTE — PATIENT INSTRUCTIONS

## 2024-03-12 DIAGNOSIS — I10 ESSENTIAL HYPERTENSION: ICD-10-CM

## 2024-03-12 RX ORDER — METOPROLOL TARTRATE 50 MG/1
50 TABLET, FILM COATED ORAL 2 TIMES DAILY
Qty: 60 TABLET | Refills: 0 | Status: SHIPPED | OUTPATIENT
Start: 2024-03-12

## 2024-03-12 RX ORDER — AMLODIPINE BESYLATE 10 MG/1
10 TABLET ORAL DAILY
Qty: 90 TABLET | Refills: 0 | Status: SHIPPED | OUTPATIENT
Start: 2024-03-12

## 2024-03-12 NOTE — TELEPHONE ENCOUNTER
Medication:   Requested Prescriptions     Pending Prescriptions Disp Refills    amLODIPine (NORVASC) 10 MG tablet [Pharmacy Med Name: amLODIPine Besylate Oral Tablet 10 MG] 90 tablet 0     Sig: TAKE 1 TABLET BY MOUTH DAILY       Last Filled:  08/17/2023 #90 1rf     Patient Phone Number: [phone] (home) [phone] (work)    Last appt: 3/11/2024   Next appt: Visit date not found    Lab Results   Component Value Date     03/11/2024    K 5.2 (H) 03/11/2024     03/11/2024    CO2 24 03/11/2024    BUN 15 03/11/2024    CREATININE 1.0 03/11/2024    GLUCOSE 109 (H) 03/11/2024    CALCIUM 9.0 03/11/2024    PROT 6.2 (L) 03/11/2024    LABALBU 4.1 03/11/2024    BILITOT <0.2 03/11/2024    ALKPHOS 67 03/11/2024    AST 15 03/11/2024    ALT 11 03/11/2024    LABGLOM >60 03/11/2024    GFRAA >60 06/02/2021    AGRATIO 2.0 03/11/2024    GLOB 2.3 05/13/2021

## 2024-03-12 NOTE — TELEPHONE ENCOUNTER
Medication:   Requested Prescriptions     Pending Prescriptions Disp Refills    metoprolol tartrate (LOPRESSOR) 50 MG tablet [Pharmacy Med Name: Metoprolol Tartrate Oral Tablet 50 MG] 60 tablet 0     Sig: TAKE 1 TABLET BY MOUTH TWICE DAILY       Last Filled:  09/14/2023 #60 5rf     Patient Phone Number: [phone] (home) [phone] (work)    Last appt: 3/11/2024   Next appt: Visit date not found    Lab Results   Component Value Date     03/11/2024    K 5.2 (H) 03/11/2024     03/11/2024    CO2 24 03/11/2024    BUN 15 03/11/2024    CREATININE 1.0 03/11/2024    GLUCOSE 109 (H) 03/11/2024    CALCIUM 9.0 03/11/2024    PROT 6.2 (L) 03/11/2024    LABALBU 4.1 03/11/2024    BILITOT <0.2 03/11/2024    ALKPHOS 67 03/11/2024    AST 15 03/11/2024    ALT 11 03/11/2024    LABGLOM >60 03/11/2024    GFRAA >60 06/02/2021    AGRATIO 2.0 03/11/2024    GLOB 2.3 05/13/2021

## 2024-04-10 DIAGNOSIS — E11.42 TYPE 2 DIABETES MELLITUS WITH DIABETIC POLYNEUROPATHY, WITHOUT LONG-TERM CURRENT USE OF INSULIN (HCC): ICD-10-CM

## 2024-04-10 DIAGNOSIS — I10 ESSENTIAL HYPERTENSION: ICD-10-CM

## 2024-04-10 RX ORDER — METOPROLOL TARTRATE 50 MG/1
50 TABLET, FILM COATED ORAL 2 TIMES DAILY
Qty: 180 TABLET | Refills: 1 | Status: SHIPPED | OUTPATIENT
Start: 2024-04-10

## 2024-04-10 NOTE — TELEPHONE ENCOUNTER
Medication:   Requested Prescriptions     Pending Prescriptions Disp Refills    metoprolol tartrate (LOPRESSOR) 50 MG tablet [Pharmacy Med Name: Metoprolol Tartrate Oral Tablet 50 MG] 60 tablet 0     Sig: take 1 tablet by mouth twice daily        Last Filled:      Patient Phone Number: [phone] (home) [phone] (work)    Last appt: 3/11/2024   Next appt: Visit date not found    Last OARRS:       3/11/2024     3:50 PM   RX Monitoring   Periodic Controlled Substance Monitoring Possible medication side effects, risk of tolerance/dependence & alternative treatments discussed.;No signs of potential drug abuse or diversion identified.;Obtaining appropriate analgesic effect of treatment.

## 2024-04-11 NOTE — TELEPHONE ENCOUNTER
Medication:   Requested Prescriptions     Pending Prescriptions Disp Refills    metFORMIN (GLUCOPHAGE) 1000 MG tablet [Pharmacy Med Name: metFORMIN HCl Oral Tablet 1000 MG] 180 tablet 0     Sig: Take 1 tablet by mouth twice daily with meals        Last Filled:  8/17/2023    Patient Phone Number: [phone] (home) [phone] (work)    Last appt: 3/11/2024   Next appt: Visit date not found    Last OARRS:       3/11/2024     3:50 PM   RX Monitoring   Periodic Controlled Substance Monitoring Possible medication side effects, risk of tolerance/dependence & alternative treatments discussed.;No signs of potential drug abuse or diversion identified.;Obtaining appropriate analgesic effect of treatment.

## 2024-04-23 DIAGNOSIS — R35.1 NOCTURIA: ICD-10-CM

## 2024-04-23 RX ORDER — TAMSULOSIN HYDROCHLORIDE 0.4 MG/1
0.8 CAPSULE ORAL DAILY
Qty: 60 CAPSULE | Refills: 0 | Status: SHIPPED | OUTPATIENT
Start: 2024-04-23

## 2024-04-23 NOTE — TELEPHONE ENCOUNTER
Medication:   Requested Prescriptions     Pending Prescriptions Disp Refills    tamsulosin (FLOMAX) 0.4 MG capsule [Pharmacy Med Name: Tamsulosin HCl Oral Capsule 0.4 MG] 60 capsule 0     Sig: TAKE 2 CAPSULES BY MOUTH DAILY        Last Filled:  09/14/2023 #60 5rf    Patient Phone Number: [phone] (home) [phone] (work)    Last appt: 3/11/2024   Next appt: Visit date not found    Last OARRS:       3/11/2024     3:50 PM   RX Monitoring   Periodic Controlled Substance Monitoring Possible medication side effects, risk of tolerance/dependence & alternative treatments discussed.;No signs of potential drug abuse or diversion identified.;Obtaining appropriate analgesic effect of treatment.

## 2024-05-19 DIAGNOSIS — R35.1 NOCTURIA: ICD-10-CM

## 2024-05-20 RX ORDER — TAMSULOSIN HYDROCHLORIDE 0.4 MG/1
0.8 CAPSULE ORAL DAILY
Qty: 60 CAPSULE | Refills: 5 | Status: SHIPPED | OUTPATIENT
Start: 2024-05-20

## 2024-05-20 NOTE — TELEPHONE ENCOUNTER
Medication:   Requested Prescriptions     Pending Prescriptions Disp Refills    tamsulosin (FLOMAX) 0.4 MG capsule [Pharmacy Med Name: Tamsulosin HCl Oral Capsule 0.4 MG] 60 capsule 0     Sig: TAKE TWO CAPSULES BY MOUTH DAILY        Last Filled:      Patient Phone Number: [phone] (home) [phone] (work)    Last appt: 3/11/2024   Next appt: Visit date not found    Last OARRS:       3/11/2024     3:50 PM   RX Monitoring   Periodic Controlled Substance Monitoring Possible medication side effects, risk of tolerance/dependence & alternative treatments discussed.;No signs of potential drug abuse or diversion identified.;Obtaining appropriate analgesic effect of treatment.

## 2024-06-06 DIAGNOSIS — I10 ESSENTIAL HYPERTENSION: ICD-10-CM

## 2024-06-06 RX ORDER — AMLODIPINE BESYLATE 10 MG/1
10 TABLET ORAL DAILY
Qty: 90 TABLET | Refills: 0 | Status: SHIPPED | OUTPATIENT
Start: 2024-06-06

## 2024-06-06 NOTE — TELEPHONE ENCOUNTER
Medication:   Requested Prescriptions     Pending Prescriptions Disp Refills    amLODIPine (NORVASC) 10 MG tablet [Pharmacy Med Name: amLODIPine Besylate Oral Tablet 10 MG] 90 tablet 0     Sig: TAKE ONE TABLET BY MOUTH DAILY        Last Filled:  3/12/2024    Patient Phone Number: [phone] (home) [phone] (work)    Last appt: 3/11/2024   Next appt: Visit date not found    Last OARRS:       3/11/2024     3:50 PM   RX Monitoring   Periodic Controlled Substance Monitoring Possible medication side effects, risk of tolerance/dependence & alternative treatments discussed.;No signs of potential drug abuse or diversion identified.;Obtaining appropriate analgesic effect of treatment.

## 2024-06-07 NOTE — TELEPHONE ENCOUNTER
Medication:   Requested Prescriptions     Pending Prescriptions Disp Refills    lidocaine (LIDODERM) 5 % [Pharmacy Med Name: Lidocaine External Patch 5 %] 30 patch 0     Sig: Place 1 patch onto the skin once daily, 12 hours on and then 12 hours off        Last Filled:      Patient Phone Number: [phone] (home) [phone] (work)    Last appt: 3/11/2024   Next appt: Visit date not found    Last OARRS:       3/11/2024     3:50 PM   RX Monitoring   Periodic Controlled Substance Monitoring Possible medication side effects, risk of tolerance/dependence & alternative treatments discussed.;No signs of potential drug abuse or diversion identified.;Obtaining appropriate analgesic effect of treatment.

## 2024-06-10 RX ORDER — LIDOCAINE 50 MG/G
PATCH TOPICAL
Qty: 30 PATCH | Refills: 0 | Status: SHIPPED | OUTPATIENT
Start: 2024-06-10

## 2024-07-08 DIAGNOSIS — I10 ESSENTIAL HYPERTENSION: ICD-10-CM

## 2024-07-08 DIAGNOSIS — E11.42 TYPE 2 DIABETES MELLITUS WITH DIABETIC POLYNEUROPATHY, WITHOUT LONG-TERM CURRENT USE OF INSULIN (HCC): ICD-10-CM

## 2024-07-08 RX ORDER — LISINOPRIL 40 MG/1
40 TABLET ORAL DAILY
Qty: 90 TABLET | Refills: 2 | Status: SHIPPED | OUTPATIENT
Start: 2024-07-08

## 2024-07-08 NOTE — TELEPHONE ENCOUNTER
Medication:   Requested Prescriptions     Pending Prescriptions Disp Refills    metFORMIN (GLUCOPHAGE) 1000 MG tablet [Pharmacy Med Name: metFORMIN HCl Oral Tablet 1000 MG] 180 tablet 0     Sig: take 1 tablet by mouth twice daily with meals    lisinopril (PRINIVIL;ZESTRIL) 40 MG tablet [Pharmacy Med Name: Lisinopril Oral Tablet 40 MG] 30 tablet 0     Sig: TAKE ONE TABLET BY MOUTH DAILY       Last Filled:  Metformin 4/11/2024 180 tablet, Refills: 0 ordered   Lisinopril 1/10/2024 30 tablet, Refills: 5 ordered     Patient Phone Number: [phone] (home) [phone] (work)    Last appt: 3/11/2024   Next appt: Visit date not found    Last Labs DM:   Lab Results   Component Value Date/Time    LABA1C 6.2 08/17/2023 01:45 PM

## 2024-07-11 DIAGNOSIS — M15.9 PRIMARY OSTEOARTHRITIS INVOLVING MULTIPLE JOINTS: ICD-10-CM

## 2024-07-12 RX ORDER — TRAMADOL HYDROCHLORIDE 50 MG/1
50 TABLET ORAL EVERY 8 HOURS PRN
Qty: 90 TABLET | Refills: 0 | Status: SHIPPED | OUTPATIENT
Start: 2024-07-12 | End: 2024-10-10

## 2024-07-12 NOTE — TELEPHONE ENCOUNTER
Medication:   Requested Prescriptions     Pending Prescriptions Disp Refills    traMADol (ULTRAM) 50 MG tablet [Pharmacy Med Name: traMADol HCl Oral Tablet 50 MG] 90 tablet 0     Sig: TAKE 1 TABLET BY MOUTH EVERY 8 HOURS AS NEEDED FOR PAIN FOR UP TO 90 DAYS. REDUCE DOSE AS PAIN BECOMES MANAGEABLE        Last Filled:  3/11/2024    Patient Phone Number: [phone] (home) [phone] (work)    Last appt: 3/11/2024   Next appt: Visit date not found    Last OARRS:       3/11/2024     3:50 PM   RX Monitoring   Periodic Controlled Substance Monitoring Possible medication side effects, risk of tolerance/dependence & alternative treatments discussed.;No signs of potential drug abuse or diversion identified.;Obtaining appropriate analgesic effect of treatment.

## 2024-07-15 DIAGNOSIS — M15.9 PRIMARY OSTEOARTHRITIS INVOLVING MULTIPLE JOINTS: ICD-10-CM

## 2024-07-16 RX ORDER — TRAMADOL HYDROCHLORIDE 50 MG/1
TABLET ORAL
Qty: 90 TABLET | Refills: 0 | OUTPATIENT
Start: 2024-07-16

## 2024-08-08 ENCOUNTER — OFFICE VISIT (OUTPATIENT)
Dept: FAMILY MEDICINE CLINIC | Age: 64
End: 2024-08-08

## 2024-08-08 VITALS
OXYGEN SATURATION: 98 % | SYSTOLIC BLOOD PRESSURE: 132 MMHG | HEIGHT: 70 IN | BODY MASS INDEX: 28.77 KG/M2 | WEIGHT: 201 LBS | HEART RATE: 82 BPM | DIASTOLIC BLOOD PRESSURE: 78 MMHG

## 2024-08-08 DIAGNOSIS — F11.20 OPIOID DEPENDENCE WITH CURRENT USE (HCC): ICD-10-CM

## 2024-08-08 DIAGNOSIS — G63 POLYNEUROPATHY ASSOCIATED WITH UNDERLYING DISEASE (HCC): ICD-10-CM

## 2024-08-08 DIAGNOSIS — E11.42 TYPE 2 DIABETES MELLITUS WITH DIABETIC POLYNEUROPATHY, WITHOUT LONG-TERM CURRENT USE OF INSULIN (HCC): Primary | ICD-10-CM

## 2024-08-08 DIAGNOSIS — Z95.1 S/P CORONARY ARTERY BYPASS GRAFT X 4: ICD-10-CM

## 2024-08-08 DIAGNOSIS — I48.19 PERSISTENT ATRIAL FIBRILLATION (HCC): ICD-10-CM

## 2024-08-08 DIAGNOSIS — E78.00 HYPERCHOLESTEREMIA: ICD-10-CM

## 2024-08-08 DIAGNOSIS — M15.9 PRIMARY OSTEOARTHRITIS INVOLVING MULTIPLE JOINTS: ICD-10-CM

## 2024-08-08 DIAGNOSIS — D68.69 SECONDARY HYPERCOAGULABLE STATE (HCC): ICD-10-CM

## 2024-08-08 DIAGNOSIS — I71.23 ANEURYSM OF DESCENDING THORACIC AORTA WITHOUT RUPTURE (HCC): ICD-10-CM

## 2024-08-08 DIAGNOSIS — I10 ESSENTIAL HYPERTENSION: ICD-10-CM

## 2024-08-08 RX ORDER — TRAMADOL HYDROCHLORIDE 50 MG/1
50 TABLET ORAL EVERY 8 HOURS PRN
Qty: 90 TABLET | Refills: 0 | Status: SHIPPED | OUTPATIENT
Start: 2024-08-08 | End: 2024-11-06

## 2024-08-08 NOTE — PROGRESS NOTES
[facility]    URETHRAL STRICTURE DILATATION  2017, 2017    Dr. Perez     No family history on file.  Social History     Socioeconomic History    Marital status:      Spouse name: Not on file    Number of children: Not on file    Years of education: Not on file    Highest education level: Not on file   Occupational History    Not on file   Tobacco Use    Smoking status: Former     Current packs/day: 0.00     Average packs/day: 1 pack/day for 20.0 years (20.0 ttl pk-yrs)     Types: Cigarettes     Start date: 1989     Quit date: 2009     Years since quittin.7    Smokeless tobacco: Never   Substance and Sexual Activity    Alcohol use: No    Drug use: No    Sexual activity: Not on file   Other Topics Concern    Not on file   Social History Narrative    Not on file     Social Determinants of Health     Financial Resource Strain: Low Risk  (2023)    Overall Financial Resource Strain (CARDIA)     Difficulty of Paying Living Expenses: Not hard at all   Food Insecurity: No Food Insecurity (2023)    Hunger Vital Sign     Worried About Running Out of Food in the Last Year: Never true     Ran Out of Food in the Last Year: Never true   Transportation Needs: Unknown (2023)    PRAPARE - Transportation     Lack of Transportation (Medical): Not on file     Lack of Transportation (Non-Medical): No   Physical Activity: Insufficiently Active (3/11/2024)    Exercise Vital Sign     Days of Exercise per Week: 3 days     Minutes of Exercise per Session: 30 min   Stress: Not on file   Social Connections: Not on file   Intimate Partner Violence: Not on file   Housing Stability: Unknown (2023)    Housing Stability Vital Sign     Unable to Pay for Housing in the Last Year: Not on file     Number of Places Lived in the Last Year: Not on file     Unstable Housing in the Last Year: No         ROS:  Gen:  Denies fever, chills, headaches.  HEENT:  Denies cold symptoms, sore throat.  CV:  Denies chest

## 2024-09-10 DIAGNOSIS — I10 ESSENTIAL HYPERTENSION: ICD-10-CM

## 2024-09-10 RX ORDER — METOPROLOL TARTRATE 50 MG
50 TABLET ORAL 2 TIMES DAILY
Qty: 180 TABLET | Refills: 0 | Status: SHIPPED | OUTPATIENT
Start: 2024-09-10

## 2024-09-10 RX ORDER — AMLODIPINE BESYLATE 10 MG/1
10 TABLET ORAL DAILY
Qty: 90 TABLET | Refills: 0 | Status: SHIPPED | OUTPATIENT
Start: 2024-09-10

## 2024-10-16 DIAGNOSIS — M15.0 PRIMARY OSTEOARTHRITIS INVOLVING MULTIPLE JOINTS: ICD-10-CM

## 2024-10-16 RX ORDER — TRAMADOL HYDROCHLORIDE 50 MG/1
50 TABLET ORAL EVERY 8 HOURS PRN
Qty: 90 TABLET | Refills: 0 | Status: SHIPPED | OUTPATIENT
Start: 2024-10-16 | End: 2024-11-16

## 2024-10-25 DIAGNOSIS — R35.1 NOCTURIA: ICD-10-CM

## 2024-10-25 RX ORDER — TAMSULOSIN HYDROCHLORIDE 0.4 MG/1
0.8 CAPSULE ORAL DAILY
Qty: 60 CAPSULE | Refills: 0 | Status: SHIPPED | OUTPATIENT
Start: 2024-10-25

## 2024-10-25 NOTE — TELEPHONE ENCOUNTER
Medication:   Requested Prescriptions     Pending Prescriptions Disp Refills    tamsulosin (FLOMAX) 0.4 MG capsule [Pharmacy Med Name: Tamsulosin HCl Oral Capsule 0.4 MG] 60 capsule 0     Sig: Take 2 capsules by mouth daily        Last Filled:  5/20/2024    Patient Phone Number: [phone] (home) [phone] (work)    Last appt: 8/8/2024   Next appt: Visit date not found    Last OARRS:       8/8/2024     2:59 PM   RX Monitoring   Periodic Controlled Substance Monitoring Possible medication side effects, risk of tolerance/dependence & alternative treatments discussed.;No signs of potential drug abuse or diversion identified.;Assessed functional status (ability to engage in work or other purposeful activities, the pain intensity and its interference with activities of daily living, quality of family life and social activities, and the physical activity);Obtaining appropriate analgesic effect of treatment.

## 2024-11-26 DIAGNOSIS — M15.0 PRIMARY OSTEOARTHRITIS INVOLVING MULTIPLE JOINTS: ICD-10-CM

## 2024-11-26 RX ORDER — TRAMADOL HYDROCHLORIDE 50 MG/1
50 TABLET ORAL EVERY 8 HOURS PRN
Qty: 90 TABLET | Refills: 0 | OUTPATIENT
Start: 2024-11-26 | End: 2024-12-26

## 2024-11-26 NOTE — TELEPHONE ENCOUNTER
Medication:   Requested Prescriptions     Pending Prescriptions Disp Refills    traMADol (ULTRAM) 50 MG tablet [Pharmacy Med Name: traMADol HCl Oral Tablet 50 MG] 90 tablet 0     Sig: Take 1 tablet by mouth every 8 hours as needed for Pain for up to 30 days. Max Daily Amount: 150 mg        Last Filled:  10/16/2024 #90 0rf     Patient Phone Number: [phone] (home) [phone] (work)    Last appt: 8/8/2024   Next appt: Visit date not found    Last OARRS:       8/8/2024     2:59 PM   RX Monitoring   Periodic Controlled Substance Monitoring Possible medication side effects, risk of tolerance/dependence & alternative treatments discussed.;No signs of potential drug abuse or diversion identified.;Assessed functional status (ability to engage in work or other purposeful activities, the pain intensity and its interference with activities of daily living, quality of family life and social activities, and the physical activity);Obtaining appropriate analgesic effect of treatment.

## 2024-11-27 NOTE — TELEPHONE ENCOUNTER
Patient scheduled.    Recent Visits  Date Type Provider Dept   08/08/24 Office Visit Jovanna Ferro MD [facility]   03/11/24 Office Visit Jovanna Ferro MD [facility]   09/14/23 Office Visit Jovanna Ferro MD [facility]   08/17/23 Office Visit Jovanna Ferro MD [facility]   Showing recent visits within past 540 days with a meds authorizing provider and meeting all other requirements  Future Appointments  Date Type Provider Dept   12/06/24 Appointment Jovanna Ferro MD [facility]   Showing future appointments within next 150 days with a meds authorizing provider and meeting all other requirements

## 2024-12-05 DIAGNOSIS — I10 ESSENTIAL HYPERTENSION: ICD-10-CM

## 2024-12-06 ENCOUNTER — TELEMEDICINE (OUTPATIENT)
Dept: FAMILY MEDICINE CLINIC | Age: 64
End: 2024-12-06

## 2024-12-06 DIAGNOSIS — I48.19 PERSISTENT ATRIAL FIBRILLATION (HCC): ICD-10-CM

## 2024-12-06 DIAGNOSIS — E78.00 HYPERCHOLESTEREMIA: ICD-10-CM

## 2024-12-06 DIAGNOSIS — I10 ESSENTIAL HYPERTENSION: ICD-10-CM

## 2024-12-06 DIAGNOSIS — R35.1 NOCTURIA: ICD-10-CM

## 2024-12-06 DIAGNOSIS — E11.42 TYPE 2 DIABETES MELLITUS WITH DIABETIC POLYNEUROPATHY, WITHOUT LONG-TERM CURRENT USE OF INSULIN (HCC): ICD-10-CM

## 2024-12-06 DIAGNOSIS — M15.0 PRIMARY OSTEOARTHRITIS INVOLVING MULTIPLE JOINTS: ICD-10-CM

## 2024-12-06 RX ORDER — LISINOPRIL 40 MG/1
40 TABLET ORAL DAILY
Qty: 90 TABLET | Refills: 3 | Status: SHIPPED | OUTPATIENT
Start: 2024-12-06

## 2024-12-06 RX ORDER — TAMSULOSIN HYDROCHLORIDE 0.4 MG/1
0.8 CAPSULE ORAL DAILY
Qty: 180 CAPSULE | Refills: 3 | Status: SHIPPED | OUTPATIENT
Start: 2024-12-06

## 2024-12-06 RX ORDER — METOPROLOL TARTRATE 50 MG
50 TABLET ORAL 2 TIMES DAILY
Qty: 180 TABLET | Refills: 3 | Status: SHIPPED | OUTPATIENT
Start: 2024-12-06

## 2024-12-06 RX ORDER — ROSUVASTATIN CALCIUM 40 MG/1
40 TABLET, COATED ORAL NIGHTLY
Qty: 90 TABLET | Refills: 3 | Status: SHIPPED | OUTPATIENT
Start: 2024-12-06

## 2024-12-06 RX ORDER — AMLODIPINE BESYLATE 10 MG/1
10 TABLET ORAL DAILY
Qty: 90 TABLET | Refills: 3 | Status: SHIPPED | OUTPATIENT
Start: 2024-12-06

## 2024-12-06 RX ORDER — TRAMADOL HYDROCHLORIDE 50 MG/1
50 TABLET ORAL EVERY 8 HOURS PRN
Qty: 90 TABLET | Refills: 0 | Status: SHIPPED | OUTPATIENT
Start: 2024-12-06 | End: 2025-01-05

## 2024-12-06 RX ORDER — AMLODIPINE BESYLATE 10 MG/1
10 TABLET ORAL DAILY
Qty: 90 TABLET | Refills: 0 | Status: SHIPPED | OUTPATIENT
Start: 2024-12-06 | End: 2024-12-06 | Stop reason: SDUPTHER

## 2024-12-06 NOTE — TELEPHONE ENCOUNTER
Medication:   Requested Prescriptions     Pending Prescriptions Disp Refills    amLODIPine (NORVASC) 10 MG tablet [Pharmacy Med Name: amLODIPine Besylate Oral Tablet 10 MG] 90 tablet 0     Sig: TAKE ONE TABLET BY MOUTH DAILY        Last Filled:  09/10/2024 #90 0rf     Patient Phone Number: [phone] (home) [phone] (work)    Last appt: 8/8/2024   Next appt: 12/6/2024    Last OARRS:       8/8/2024     2:59 PM   RX Monitoring   Periodic Controlled Substance Monitoring Possible medication side effects, risk of tolerance/dependence & alternative treatments discussed.;No signs of potential drug abuse or diversion identified.;Assessed functional status (ability to engage in work or other purposeful activities, the pain intensity and its interference with activities of daily living, quality of family life and social activities, and the physical activity);Obtaining appropriate analgesic effect of treatment.

## 2024-12-06 NOTE — PROGRESS NOTES
2024    TELEHEALTH EVALUATION -- Audio/Visual (During COVID-19 public health emergency)    HPI:    Gallito Brown (:  1960) has requested an audio/video evaluation for the following concern(s):    C/o widespread joint pain from OA. Taking tramadol 50mg TID which helps. pain mostly in hips. Ran out yesterday and had a lot of pain last night trying to sleep    Review of Systems:  Gen:  Denies fever, chills, headaches. No weight loss  HEENT:  Denies cold symptoms, sore throat.  CV:  Denies chest pain or tightness, palpitations.  Pulm:  Denies shortness of breath, cough.  Abd:  Denies abdominal pain, change in bowel habits.    Prior to Visit Medications    Medication Sig Taking? Authorizing Provider   amLODIPine (NORVASC) 10 MG tablet TAKE ONE TABLET BY MOUTH DAILY Yes Jovanna Ferro MD   tamsulosin (FLOMAX) 0.4 MG capsule Take 2 capsules by mouth daily Yes Vernell Shelton MD   metoprolol tartrate (LOPRESSOR) 50 MG tablet TAKE ONE TABLET BY MOUTH TWICE DAILY Yes Dasia Baker MD   metFORMIN (GLUCOPHAGE) 1000 MG tablet take 1 tablet by mouth twice daily with meals Yes Brianna Pickens MD   lisinopril (PRINIVIL;ZESTRIL) 40 MG tablet TAKE ONE TABLET BY MOUTH DAILY Yes Brianna Pickens MD   lidocaine (LIDODERM) 5 % Place 1 patch onto the skin once daily, 12 hours on and then 12 hours off Yes Jovanna Ferro MD   rivaroxaban (XARELTO) 20 MG TABS tablet Take 1 tablet by mouth Daily with supper Yes Jovanna Ferro MD   rosuvastatin (CRESTOR) 40 MG tablet Take 1 tablet by mouth nightly Yes Jovanna Ferro MD   glucose monitoring (FREESTYLE FREEDOM) kit 1 kit by Does not apply route daily Yes Jovanna Ferro MD   Lancets MISC Use as directed Yes Jovanna Ferro MD   blood glucose monitor strips Test 1 times a day & as needed for symptoms of irregular blood glucose. Yes Jovanna Ferro MD   aspirin 81 MG tablet Take 1 tablet by mouth daily Yes ProviderRobert MD

## 2025-01-17 DIAGNOSIS — M15.0 PRIMARY OSTEOARTHRITIS INVOLVING MULTIPLE JOINTS: ICD-10-CM

## 2025-01-17 NOTE — TELEPHONE ENCOUNTER
Medication:   Requested Prescriptions     Pending Prescriptions Disp Refills    traMADol (ULTRAM) 50 MG tablet [Pharmacy Med Name: traMADol HCl Oral Tablet 50 MG] 90 tablet 0     Sig: Take 1 tablet by mouth every 8 hours as needed for Pain for up to 30 days. Max Daily Amount: 150 mg        Last Filled:      Patient Phone Number: [phone] (home) [phone] (work)    Last appt: 12/6/2024   Next appt: Visit date not found    Last OARRS:       12/6/2024    12:20 PM   RX Monitoring   Periodic Controlled Substance Monitoring Possible medication side effects, risk of tolerance/dependence & alternative treatments discussed.;No signs of potential drug abuse or diversion identified.;Assessed functional status (ability to engage in work or other purposeful activities, the pain intensity and its interference with activities of daily living, quality of family life and social activities, and the physical activity);Obtaining appropriate analgesic effect of treatment.

## 2025-01-21 RX ORDER — TRAMADOL HYDROCHLORIDE 50 MG/1
50 TABLET ORAL EVERY 8 HOURS PRN
Qty: 90 TABLET | Refills: 0 | Status: SHIPPED | OUTPATIENT
Start: 2025-01-21 | End: 2025-02-20

## 2025-03-06 DIAGNOSIS — M15.0 PRIMARY OSTEOARTHRITIS INVOLVING MULTIPLE JOINTS: ICD-10-CM

## 2025-03-06 RX ORDER — TRAMADOL HYDROCHLORIDE 50 MG/1
50 TABLET ORAL EVERY 8 HOURS PRN
Qty: 90 TABLET | Refills: 0 | OUTPATIENT
Start: 2025-03-06 | End: 2025-04-06

## 2025-03-06 NOTE — TELEPHONE ENCOUNTER
Medication:   Requested Prescriptions     Pending Prescriptions Disp Refills    traMADol (ULTRAM) 50 MG tablet [Pharmacy Med Name: traMADol HCl Oral Tablet 50 MG] 90 tablet 0        Last Filled:  01/21/2025 #90 0rf     Patient Phone Number: [phone] (home) [phone] (work)    Last appt: 12/6/2024   Next appt: 3/26/2025    Last OARRS:       12/6/2024    12:20 PM   RX Monitoring   Periodic Controlled Substance Monitoring Possible medication side effects, risk of tolerance/dependence & alternative treatments discussed.;No signs of potential drug abuse or diversion identified.;Assessed functional status (ability to engage in work or other purposeful activities, the pain intensity and its interference with activities of daily living, quality of family life and social activities, and the physical activity);Obtaining appropriate analgesic effect of treatment.

## 2025-03-07 ENCOUNTER — TELEMEDICINE (OUTPATIENT)
Dept: FAMILY MEDICINE CLINIC | Age: 65
End: 2025-03-07

## 2025-03-07 DIAGNOSIS — F11.20 OPIOID DEPENDENCE WITH CURRENT USE (HCC): ICD-10-CM

## 2025-03-07 DIAGNOSIS — I48.19 PERSISTENT ATRIAL FIBRILLATION (HCC): ICD-10-CM

## 2025-03-07 DIAGNOSIS — M15.0 PRIMARY OSTEOARTHRITIS INVOLVING MULTIPLE JOINTS: ICD-10-CM

## 2025-03-07 DIAGNOSIS — I10 ESSENTIAL HYPERTENSION: ICD-10-CM

## 2025-03-07 DIAGNOSIS — E78.00 HYPERCHOLESTEREMIA: Primary | ICD-10-CM

## 2025-03-07 DIAGNOSIS — I71.23 ANEURYSM OF DESCENDING THORACIC AORTA WITHOUT RUPTURE: ICD-10-CM

## 2025-03-07 DIAGNOSIS — E11.42 TYPE 2 DIABETES MELLITUS WITH DIABETIC POLYNEUROPATHY, WITHOUT LONG-TERM CURRENT USE OF INSULIN (HCC): ICD-10-CM

## 2025-03-07 RX ORDER — TRAMADOL HYDROCHLORIDE 50 MG/1
50 TABLET ORAL EVERY 8 HOURS PRN
Qty: 90 TABLET | Refills: 0 | Status: SHIPPED | OUTPATIENT
Start: 2025-03-07 | End: 2025-04-06

## 2025-03-07 SDOH — ECONOMIC STABILITY: FOOD INSECURITY: WITHIN THE PAST 12 MONTHS, YOU WORRIED THAT YOUR FOOD WOULD RUN OUT BEFORE YOU GOT MONEY TO BUY MORE.: NEVER TRUE

## 2025-03-07 SDOH — ECONOMIC STABILITY: FOOD INSECURITY: WITHIN THE PAST 12 MONTHS, THE FOOD YOU BOUGHT JUST DIDN'T LAST AND YOU DIDN'T HAVE MONEY TO GET MORE.: NEVER TRUE

## 2025-03-07 ASSESSMENT — PATIENT HEALTH QUESTIONNAIRE - PHQ9
SUM OF ALL RESPONSES TO PHQ QUESTIONS 1-9: 0
SUM OF ALL RESPONSES TO PHQ QUESTIONS 1-9: 0
1. LITTLE INTEREST OR PLEASURE IN DOING THINGS: NOT AT ALL
SUM OF ALL RESPONSES TO PHQ QUESTIONS 1-9: 0
SUM OF ALL RESPONSES TO PHQ QUESTIONS 1-9: 0
2. FEELING DOWN, DEPRESSED OR HOPELESS: NOT AT ALL

## 2025-03-07 NOTE — PROGRESS NOTES
which includes applicable co-pays. This Virtual Visit was conducted with patient's (and/or legal guardian's) consent. Patient identification was verified, and a caregiver was present when appropriate.   The patient was located at Home: [address] [place] [id number]  Provider was located at Home (Appt Dept State): [place]  Confirm you are appropriately licensed, registered, or certified to deliver care in the state where the patient is located as indicated above. If you are not or unsure, please re-schedule the visit: Yes, I confirm.       Total time spent on this encounter: Not billed by time    --Jovanna Ferro MD on 3/7/2025 at 11:50 AM      An electronic signature was used to authenticate this note..

## 2025-03-24 DIAGNOSIS — E78.00 HYPERCHOLESTEREMIA: ICD-10-CM

## 2025-03-24 DIAGNOSIS — I10 ESSENTIAL HYPERTENSION: ICD-10-CM

## 2025-03-24 DIAGNOSIS — E11.42 TYPE 2 DIABETES MELLITUS WITH DIABETIC POLYNEUROPATHY, WITHOUT LONG-TERM CURRENT USE OF INSULIN (HCC): ICD-10-CM

## 2025-03-24 LAB
ALBUMIN SERPL-MCNC: 4.2 G/DL (ref 3.4–5)
ALBUMIN/GLOB SERPL: 1.8 {RATIO} (ref 1.1–2.2)
ALP SERPL-CCNC: 63 U/L (ref 40–129)
ALT SERPL-CCNC: 16 U/L (ref 10–40)
ANION GAP SERPL CALCULATED.3IONS-SCNC: 10 MMOL/L (ref 3–16)
AST SERPL-CCNC: 19 U/L (ref 15–37)
BILIRUB SERPL-MCNC: <0.2 MG/DL (ref 0–1)
BUN SERPL-MCNC: 18 MG/DL (ref 7–20)
CALCIUM SERPL-MCNC: 9.4 MG/DL (ref 8.3–10.6)
CHLORIDE SERPL-SCNC: 106 MMOL/L (ref 99–110)
CHOLEST SERPL-MCNC: 228 MG/DL (ref 0–199)
CO2 SERPL-SCNC: 24 MMOL/L (ref 21–32)
CREAT SERPL-MCNC: 1.1 MG/DL (ref 0.8–1.3)
GFR SERPLBLD CREATININE-BSD FMLA CKD-EPI: 74 ML/MIN/{1.73_M2}
GLUCOSE SERPL-MCNC: 127 MG/DL (ref 70–99)
HDLC SERPL-MCNC: 32 MG/DL (ref 40–60)
LDLC SERPL CALC-MCNC: 153 MG/DL
POTASSIUM SERPL-SCNC: 5.2 MMOL/L (ref 3.5–5.1)
PROT SERPL-MCNC: 6.5 G/DL (ref 6.4–8.2)
SODIUM SERPL-SCNC: 140 MMOL/L (ref 136–145)
TRIGL SERPL-MCNC: 214 MG/DL (ref 0–150)
VLDLC SERPL CALC-MCNC: 43 MG/DL

## 2025-03-25 DIAGNOSIS — E11.42 TYPE 2 DIABETES MELLITUS WITH DIABETIC POLYNEUROPATHY, WITHOUT LONG-TERM CURRENT USE OF INSULIN (HCC): ICD-10-CM

## 2025-03-25 DIAGNOSIS — E11.42 TYPE 2 DIABETES MELLITUS WITH DIABETIC POLYNEUROPATHY, WITHOUT LONG-TERM CURRENT USE OF INSULIN (HCC): Primary | ICD-10-CM

## 2025-03-25 LAB
BASOPHILS # BLD: 0 K/UL (ref 0–0.2)
BASOPHILS NFR BLD: 0.5 %
DEPRECATED RDW RBC AUTO: 15.9 % (ref 12.4–15.4)
EOSINOPHIL # BLD: 0.1 K/UL (ref 0–0.6)
EOSINOPHIL NFR BLD: 1.9 %
EST. AVERAGE GLUCOSE BLD GHB EST-MCNC: 142.7 MG/DL
HBA1C MFR BLD: 6.6 %
HCT VFR BLD AUTO: 38.7 % (ref 40.5–52.5)
HGB BLD-MCNC: 12.6 G/DL (ref 13.5–17.5)
LYMPHOCYTES # BLD: 2.7 K/UL (ref 1–5.1)
LYMPHOCYTES NFR BLD: 46.1 %
MCH RBC QN AUTO: 27.7 PG (ref 26–34)
MCHC RBC AUTO-ENTMCNC: 32.6 G/DL (ref 31–36)
MCV RBC AUTO: 84.8 FL (ref 80–100)
MONOCYTES # BLD: 0.4 K/UL (ref 0–1.3)
MONOCYTES NFR BLD: 6.8 %
NEUTROPHILS # BLD: 2.6 K/UL (ref 1.7–7.7)
NEUTROPHILS NFR BLD: 44.7 %
PLATELET # BLD AUTO: 174 K/UL (ref 135–450)
PMV BLD AUTO: 10.7 FL (ref 5–10.5)
RBC # BLD AUTO: 4.56 M/UL (ref 4.2–5.9)
WBC # BLD AUTO: 5.8 K/UL (ref 4–11)

## 2025-03-26 ENCOUNTER — OFFICE VISIT (OUTPATIENT)
Dept: FAMILY MEDICINE CLINIC | Age: 65
End: 2025-03-26
Payer: MEDICARE

## 2025-03-26 ENCOUNTER — RESULTS FOLLOW-UP (OUTPATIENT)
Dept: FAMILY MEDICINE CLINIC | Age: 65
End: 2025-03-26

## 2025-03-26 VITALS
OXYGEN SATURATION: 98 % | DIASTOLIC BLOOD PRESSURE: 72 MMHG | HEIGHT: 70 IN | SYSTOLIC BLOOD PRESSURE: 134 MMHG | WEIGHT: 205 LBS | HEART RATE: 86 BPM | BODY MASS INDEX: 29.35 KG/M2

## 2025-03-26 DIAGNOSIS — I71.23 ANEURYSM OF DESCENDING THORACIC AORTA WITHOUT RUPTURE: ICD-10-CM

## 2025-03-26 DIAGNOSIS — Z86.79 HISTORY OF CORONARY ARTERY DISEASE: ICD-10-CM

## 2025-03-26 DIAGNOSIS — Z00.00 MEDICARE ANNUAL WELLNESS VISIT, SUBSEQUENT: Primary | ICD-10-CM

## 2025-03-26 DIAGNOSIS — I10 ESSENTIAL HYPERTENSION: ICD-10-CM

## 2025-03-26 DIAGNOSIS — I48.19 PERSISTENT ATRIAL FIBRILLATION (HCC): ICD-10-CM

## 2025-03-26 DIAGNOSIS — M15.0 PRIMARY OSTEOARTHRITIS INVOLVING MULTIPLE JOINTS: ICD-10-CM

## 2025-03-26 DIAGNOSIS — E78.00 HYPERCHOLESTEREMIA: ICD-10-CM

## 2025-03-26 DIAGNOSIS — Z95.1 S/P CORONARY ARTERY BYPASS GRAFT X 4: ICD-10-CM

## 2025-03-26 DIAGNOSIS — E11.42 TYPE 2 DIABETES MELLITUS WITH DIABETIC POLYNEUROPATHY, WITHOUT LONG-TERM CURRENT USE OF INSULIN (HCC): ICD-10-CM

## 2025-03-26 LAB
CREAT UR-MCNC: 133 MG/DL (ref 39–259)
MICROALBUMIN UR DL<=1MG/L-MCNC: 11.7 MG/DL
MICROALBUMIN/CREAT UR: 88 MG/G (ref 0–30)

## 2025-03-26 PROCEDURE — 3075F SYST BP GE 130 - 139MM HG: CPT | Performed by: FAMILY MEDICINE

## 2025-03-26 PROCEDURE — 3044F HG A1C LEVEL LT 7.0%: CPT | Performed by: FAMILY MEDICINE

## 2025-03-26 PROCEDURE — 3078F DIAST BP <80 MM HG: CPT | Performed by: FAMILY MEDICINE

## 2025-03-26 PROCEDURE — 1159F MED LIST DOCD IN RCRD: CPT | Performed by: FAMILY MEDICINE

## 2025-03-26 PROCEDURE — 3017F COLORECTAL CA SCREEN DOC REV: CPT | Performed by: FAMILY MEDICINE

## 2025-03-26 PROCEDURE — 1123F ACP DISCUSS/DSCN MKR DOCD: CPT | Performed by: FAMILY MEDICINE

## 2025-03-26 PROCEDURE — G0439 PPPS, SUBSEQ VISIT: HCPCS | Performed by: FAMILY MEDICINE

## 2025-03-26 ASSESSMENT — PATIENT HEALTH QUESTIONNAIRE - PHQ9
SUM OF ALL RESPONSES TO PHQ QUESTIONS 1-9: 0
2. FEELING DOWN, DEPRESSED OR HOPELESS: NOT AT ALL
SUM OF ALL RESPONSES TO PHQ QUESTIONS 1-9: 0
1. LITTLE INTEREST OR PLEASURE IN DOING THINGS: NOT AT ALL
SUM OF ALL RESPONSES TO PHQ QUESTIONS 1-9: 0
SUM OF ALL RESPONSES TO PHQ QUESTIONS 1-9: 0

## 2025-03-26 NOTE — PROGRESS NOTES
Medicare Annual Wellness Visit    Gallito Brown is here for Medicare AWV    Assessment & Plan   Medicare annual wellness visit, subsequent  Type 2 diabetes mellitus with diabetic polyneuropathy, without long-term current use of insulin (HCC)  Stable. Continue current medications   -      DIABETES FOOT EXAM  Essential hypertension  Stable. Continue current medications   Hypercholesteremia  Uncontrolled. Pt encouraged to take crestor EVERY day. He voices understanding and agrees with plan. Recheck lipids in 3m  History of coronary artery disease  S/P coronary artery bypass graft x 4  Aneurysm of descending thoracic aorta without rupture  Pt continues to decline further evaluation of this. Voices understanding regarding the risks of not following up on this and continues to decline further imaging or referral.   Persistent atrial fibrillation (HCC)  Stable. Continue xarelto  Primary osteoarthritis involving multiple joints  Stable. Continue ultram TID PRN    Controlled Substance Monitoring:    Acute and Chronic Pain Monitoring:   RX Monitoring Periodic Controlled Substance Monitoring   3/26/2025   1:53 PM Possible medication side effects, risk of tolerance/dependence & alternative treatments discussed.;No signs of potential drug abuse or diversion identified.;Assessed functional status (ability to engage in work or other purposeful activities, the pain intensity and its interference with activities of daily living, quality of family life and social activities, and the physical activity);Obtaining appropriate analgesic effect of treatment.            No follow-ups on file.     Subjective   The following acute and/or chronic problems were also addressed today:    Follow up hyperlipidemia- taking crestor maybe 3x per week.      Follow up hypertension- has been taking lisinopril 40mg, metoprolol 50mg BID and norvasc 10mg daily. BP at home has been stable, running stable.       C/o widespread joint pain from OA. Taking

## 2025-04-17 DIAGNOSIS — M15.0 PRIMARY OSTEOARTHRITIS INVOLVING MULTIPLE JOINTS: ICD-10-CM

## 2025-04-17 RX ORDER — TRAMADOL HYDROCHLORIDE 50 MG/1
50 TABLET ORAL EVERY 8 HOURS PRN
Qty: 90 TABLET | Refills: 0 | Status: SHIPPED | OUTPATIENT
Start: 2025-04-17 | End: 2025-05-17

## 2025-04-17 NOTE — TELEPHONE ENCOUNTER
Medication:   Requested Prescriptions     Pending Prescriptions Disp Refills    traMADol (ULTRAM) 50 MG tablet [Pharmacy Med Name: traMADol HCl Oral Tablet 50 MG] 90 tablet 0     Sig: TAKE ONE TABLET BY MOUTH EVERY EIGHT HOURS AS NEEDED FOR PAIN **MAX DAILY CEMYMP=113 MG** REDUCE DOSES TAKEN AS PAIN BECOMES MANAGEABLE        Last Filled:  3/7/2025    Patient Phone Number: [phone] (home) [phone] (work)    Last appt: 3/26/2025   Next appt: Visit date not found    Last OARRS:       3/26/2025     1:53 PM   RX Monitoring   Periodic Controlled Substance Monitoring Possible medication side effects, risk of tolerance/dependence & alternative treatments discussed.;No signs of potential drug abuse or diversion identified.;Assessed functional status (ability to engage in work or other purposeful activities, the pain intensity and its interference with activities of daily living, quality of family life and social activities, and the physical activity);Obtaining appropriate analgesic effect of treatment.

## 2025-05-28 DIAGNOSIS — M15.0 PRIMARY OSTEOARTHRITIS INVOLVING MULTIPLE JOINTS: ICD-10-CM

## 2025-05-28 RX ORDER — TRAMADOL HYDROCHLORIDE 50 MG/1
50 TABLET ORAL EVERY 8 HOURS PRN
Qty: 90 TABLET | Refills: 0 | Status: SHIPPED | OUTPATIENT
Start: 2025-05-28 | End: 2025-06-27

## 2025-05-28 NOTE — TELEPHONE ENCOUNTER
Medication:   Requested Prescriptions     Pending Prescriptions Disp Refills    traMADol (ULTRAM) 50 MG tablet [Pharmacy Med Name: traMADol HCl Oral Tablet 50 MG] 90 tablet 0     Sig: Take 1 tablet by mouth every 8 hours as needed for Pain for up to 30 days. Max Daily Amount: 150 mg        Last Filled:  03/07/2025 #90 0rf     Patient Phone Number: [phone] (home) [phone] (work)    Last appt: 3/26/2025   Next appt: Visit date not found    Last OARRS:       3/26/2025     1:53 PM   RX Monitoring   Periodic Controlled Substance Monitoring Possible medication side effects, risk of tolerance/dependence & alternative treatments discussed.;No signs of potential drug abuse or diversion identified.;Assessed functional status (ability to engage in work or other purposeful activities, the pain intensity and its interference with activities of daily living, quality of family life and social activities, and the physical activity);Obtaining appropriate analgesic effect of treatment.

## 2025-07-17 DIAGNOSIS — M15.0 PRIMARY OSTEOARTHRITIS INVOLVING MULTIPLE JOINTS: ICD-10-CM

## 2025-07-17 RX ORDER — TRAMADOL HYDROCHLORIDE 50 MG/1
50 TABLET ORAL EVERY 8 HOURS PRN
Qty: 90 TABLET | Refills: 0 | Status: SHIPPED | OUTPATIENT
Start: 2025-07-17 | End: 2025-08-16

## 2025-07-17 NOTE — TELEPHONE ENCOUNTER
Medication:   Requested Prescriptions     Pending Prescriptions Disp Refills    traMADol (ULTRAM) 50 MG tablet [Pharmacy Med Name: traMADol HCl Oral Tablet 50 MG] 90 tablet 0     Sig: TAKE 1 TABLET BY MOUTH EVERY 8 HOURS AS NEEDED FOR PAIN FOR UP TO 30 DAYS. MAX DAILY AMOUNT: 150 MG. REDUCE DOSES TAKEN AS PAIN BECOMES MANAGEABLE        Last Filled:  5/28/2025 90 tabs 0 refills     Patient Phone Number: [phone] (home) [phone] (work)    Last appt: 3/26/2025   Next appt: Visit date not found    Last OARRS:       3/26/2025     1:53 PM   RX Monitoring   Periodic Controlled Substance Monitoring Possible medication side effects, risk of tolerance/dependence & alternative treatments discussed.;No signs of potential drug abuse or diversion identified.;Assessed functional status (ability to engage in work or other purposeful activities, the pain intensity and its interference with activities of daily living, quality of family life and social activities, and the physical activity);Obtaining appropriate analgesic effect of treatment.

## 2025-08-28 DIAGNOSIS — M15.0 PRIMARY OSTEOARTHRITIS INVOLVING MULTIPLE JOINTS: ICD-10-CM

## 2025-08-28 RX ORDER — TRAMADOL HYDROCHLORIDE 50 MG/1
50 TABLET ORAL EVERY 8 HOURS PRN
Qty: 90 TABLET | Refills: 0 | OUTPATIENT
Start: 2025-08-28 | End: 2025-09-27

## 2025-09-03 ENCOUNTER — TELEMEDICINE (OUTPATIENT)
Dept: FAMILY MEDICINE CLINIC | Age: 65
End: 2025-09-03
Payer: MEDICARE

## 2025-09-03 DIAGNOSIS — E11.42 TYPE 2 DIABETES MELLITUS WITH DIABETIC POLYNEUROPATHY, WITHOUT LONG-TERM CURRENT USE OF INSULIN (HCC): Primary | ICD-10-CM

## 2025-09-03 DIAGNOSIS — I10 ESSENTIAL HYPERTENSION: ICD-10-CM

## 2025-09-03 DIAGNOSIS — M15.0 PRIMARY OSTEOARTHRITIS INVOLVING MULTIPLE JOINTS: ICD-10-CM

## 2025-09-03 DIAGNOSIS — I71.23 ANEURYSM OF DESCENDING THORACIC AORTA WITHOUT RUPTURE: ICD-10-CM

## 2025-09-03 DIAGNOSIS — E78.00 HYPERCHOLESTEREMIA: ICD-10-CM

## 2025-09-03 DIAGNOSIS — I48.19 PERSISTENT ATRIAL FIBRILLATION (HCC): ICD-10-CM

## 2025-09-03 PROCEDURE — G2211 COMPLEX E/M VISIT ADD ON: HCPCS | Performed by: FAMILY MEDICINE

## 2025-09-03 PROCEDURE — 99214 OFFICE O/P EST MOD 30 MIN: CPT | Performed by: FAMILY MEDICINE

## 2025-09-03 RX ORDER — TRAMADOL HYDROCHLORIDE 50 MG/1
50 TABLET ORAL EVERY 8 HOURS PRN
Qty: 90 TABLET | Refills: 0 | Status: SHIPPED | OUTPATIENT
Start: 2025-09-03 | End: 2025-10-03

## (undated) DEVICE — CATHETER ETER IV 22GA L1IN POLYUR STR RADPQ INTROCAN SFTY

## (undated) DEVICE — APPLICATOR MEDICATED 3 CC SOLUTION CLR STRL CHLORAPREP

## (undated) DEVICE — BLADE ES L4IN INSUL EDGE

## (undated) DEVICE — 20 ML SYRINGE LUER-LOCK TIP: Brand: MONOJECT

## (undated) DEVICE — SUTURE NONABSORBABLE MONOFILAMENT 6-0 C-1 1X30 IN PROLENE 8706H

## (undated) DEVICE — BLADE ES ELASTOMERIC COAT INSUL DURABLE BEND UPTO 90DEG

## (undated) DEVICE — BLADE OPHTH 180DEG CUT SURF BLU STR SHRP DBL BVL GRINDLESS

## (undated) DEVICE — CONTAINER STOR SURG SLUSH

## (undated) DEVICE — LABEL MED CUST CVR

## (undated) DEVICE — 3M™ STERI-STRIP™ REINFORCED ADHESIVE SKIN CLOSURES, R1547, 1/2 IN X 4 IN (12 MM X 100 MM), 6 STRIPS/ENVELOPE: Brand: 3M™ STERI-STRIP™

## (undated) DEVICE — BLADE SURG NO12 S STL STR DISP GLASSVAN

## (undated) DEVICE — BLADE RETRCT 3 SH FNGR ASST

## (undated) DEVICE — GEL US 20GM NONIRRITATING OVERWRAPPED FILE PCH TRNSMIT

## (undated) DEVICE — GAUZE,SPONGE,4"X4",8PLY,STRL,LF,10/TRAY: Brand: MEDLINE

## (undated) DEVICE — GLOVE SURG SZ 75 L12IN FNGR THK94MIL STD WHT LTX FREE

## (undated) DEVICE — CANNULA ART 24FR OVERALL L105IN VENT CONN 3 8IN MTL TIP W

## (undated) DEVICE — CANNULA PERF 7FR L5.5IN AORT ROOT RADPQ STD TIP W/ VENT LN

## (undated) DEVICE — SYRINGE, LUER LOCK, 60ML: Brand: MEDLINE

## (undated) DEVICE — SET ADMIN PRIMING 67ML L105IN NVENT 180UM FLTR 3 RLER CLMP

## (undated) DEVICE — CANNULA PERF L15IN DIA29FR VEN 3 STG THN WALL DSGN W  VENT

## (undated) DEVICE — SUTURE SZ 7 L18IN NONABSORBABLE SIL CCS L48MM 1/2 CIR STRNM M655G

## (undated) DEVICE — 3M™ COBAN™ STERILE SELF-ADHERENT WRAP, 1584S, 4 IN X 5 YD (10 CM X 4,5 M), 18 ROLLS/CASE: Brand: 3M™ COBAN™

## (undated) DEVICE — SUTURE VCRL SZ 3-0 L27IN ABSRB UD L26MM SH 1/2 CIR J416H

## (undated) DEVICE — CATHETER URETH 16FR BLLN 5CC STD LTX 2 W F TWO OPP DRNGE

## (undated) DEVICE — SUTURE ETHBND EXCEL SZ 2-0 L36IN NONABSORBABLE GRN SH-2 X559H

## (undated) DEVICE — OPTIFOAM GENTLE LIQUITRAP, SACRUM, 7"X7": Brand: MEDLINE

## (undated) DEVICE — SET PERF L15IN BLU CLMP MULT FEM LUER ON SGL INLET LEG DLP

## (undated) DEVICE — TTL1LYR 16FR10ML 100%SIL TMPST TR: Brand: MEDLINE

## (undated) DEVICE — DRAPE THER FLUID WARMING 66X44 IN FLAT SLUSH DBL DISC ORS

## (undated) DEVICE — EZE-BAND LF ST 4X5.5 36/CS: Brand: EZE-BAND LF ST 4X5.5 36/CS

## (undated) DEVICE — PRESSURE TUBING: Brand: TRUWAVE

## (undated) DEVICE — 3M™ TEGADERM™ TRANSPARENT FILM DRESSING FRAME STYLE, 1626W, 4 IN X 4-3/4 IN (10 CM X 12 CM), 50/CT 4CT/CASE: Brand: 3M™ TEGADERM™

## (undated) DEVICE — SUTURE VCRL SZ 4-0 L18IN ABSRB UD L19MM PS-2 3/8 CIR PRIM J496H

## (undated) DEVICE — SUTURE PROL SZ 8-0 L24IN NONABSORBABLE BLU L6.5MM BV130-5 8732H

## (undated) DEVICE — S-CURVE URETHRAL DILATOR SET WITH AQ, HYDROPHILIC COATING: Brand: S~CURVE

## (undated) DEVICE — BW-412T DISP COMBO CLEANING BRUSH: Brand: SINGLE USE COMBINATION CLEANING BRUSH

## (undated) DEVICE — FAIRFIELD CABG ACCESSARY PK

## (undated) DEVICE — CYSTO/BLADDER IRRIGATION SET, REGULATING CLAMP

## (undated) DEVICE — SUTURE PERMA-HAND SZ 4-0 L144IN NONABSORBABLE BLK LIGAPAK LA53G

## (undated) DEVICE — SYSTEM SKIN CLSR 22CM DERMBND PRINEO

## (undated) DEVICE — LEAD PACE L475MM CHNL A OR V MYOCARDIAL STEROID ELUT SIL

## (undated) DEVICE — SUTURE VCRL SZ 2-0 L36IN ABSRB UD L36MM CT-1 1/2 CIR J945H

## (undated) DEVICE — SUPPORT WRST AD W3.5XL9IN DIA14.5IN ART SFT ADJ HK AND LOOP

## (undated) DEVICE — MERCY FAIRFIELD TURNOVER KIT: Brand: MEDLINE INDUSTRIES, INC.

## (undated) DEVICE — Device: Brand: MEDEX

## (undated) DEVICE — APPLICATOR PREP 26ML 0.7% IOD POVACRYLEX 74% ISO ALC ST

## (undated) DEVICE — GLOVE SURG SZ 7 L11.33IN FNGR THK9.8MIL STRW LTX POLYMER

## (undated) DEVICE — PROCEDURE KIT ENDOSCP CUST

## (undated) DEVICE — SHEET,DRAPE,40X58,STERILE: Brand: MEDLINE

## (undated) DEVICE — SUTURE PERMAHAND SZ 2-0 L30IN NONABSORBABLE BLK SILK W/O A305H

## (undated) DEVICE — GLOVE ORTHO 7 1/2   MSG9475

## (undated) DEVICE — TOWEL,OR,DSP,ST,WHITE,DLX,XR,4/PK,20PK/C: Brand: MEDLINE

## (undated) DEVICE — HALF SHEET: Brand: CONVERTORS

## (undated) DEVICE — NEEDLE HYPO 18GA L1.5IN THN WALL PIVOTING SHLD BVL ORIENTED

## (undated) DEVICE — TUBING, SUCTION, 1/4" X 10', STRAIGHT: Brand: MEDLINE

## (undated) DEVICE — SUTURE NONABSORBABLE MONOFILAMENT 4-0 RB-1 36 IN BLU PROLENE 8557H

## (undated) DEVICE — SWAN-GANZ CCOMBO V THERMODILUTION CATHETER: Brand: SWAN-GANZ CCOMBO V

## (undated) DEVICE — STERNUM BLADE, OFFSET (31.7 X 0.64 X 6.3MM)

## (undated) DEVICE — DRAIN SURG 24FR BLAK SIL HUBLESS 4 CHANNELED RADPQ EXTN TB

## (undated) DEVICE — PRESSURE MONITORING SET: Brand: TRUWAVE, VAMP PLUS

## (undated) DEVICE — EVERGRIP INSERT SET 61MM: Brand: FOGARTY EVERGRIP

## (undated) DEVICE — SUTURE PROL SZ 7-0 L24IN NONABSORBABLE BLU L8MM BV175-6 3/8 8735H

## (undated) DEVICE — COVER,MAYO STAND,STERILE: Brand: MEDLINE

## (undated) DEVICE — FORCEPS BX L240CM WRK CHN 2.8MM STD CAP W/ NDL MIC MESH

## (undated) DEVICE — BOWL MED L 32OZ PLAS W/ MOLD GRAD EZ OPN PEEL PCH

## (undated) DEVICE — GLOVE ORANGE PI 7 1/2   MSG9075

## (undated) DEVICE — BLADE CLIPPER GEN PURP NS

## (undated) DEVICE — BLANKET WRM CARD FOR MISTRAL AIR WRM SYS

## (undated) DEVICE — DECANTER FLD 9IN ST BG FOR ASEP TRNSF OF FLD

## (undated) DEVICE — COVER,MAYO STAND,XL,STERILE: Brand: MEDLINE

## (undated) DEVICE — NEEDLE HYPO 27GA L0.5IN GRY POLYPR HUB S STL REG BVL STR

## (undated) DEVICE — DRESSING TRNSPAR W FAB BORD SORBAVIEW ULT 475X425IN

## (undated) DEVICE — DRAIN SURG SGL COLL PT TB FOR ATS BG OASIS

## (undated) DEVICE — SYRINGE, LUER LOCK, 10ML: Brand: MEDLINE

## (undated) DEVICE — DRESSING WND SM W2.5XL4IN BRTH W/ CATH SECUREMENT AND

## (undated) DEVICE — SUTURE ETHBND EXCEL SZ 0 L18IN NONABSORBABLE GRN L36MM CT-1 CX21D

## (undated) DEVICE — SOLUTION IV IRRIG WATER 500ML POUR BRL ST 2F7113

## (undated) DEVICE — CATH CTR VEN 3LUM INTRLNK INJ 9FRX11CM

## (undated) DEVICE — SOLUTION IV IRRIG WATER 1000ML POUR BRL 2F7114

## (undated) DEVICE — BASIC SINGLE BASIN 1-LF: Brand: MEDLINE INDUSTRIES, INC.

## (undated) DEVICE — MOUTHPIECE ENDOSCP L CTRL OPN AND SIDE PORTS DISP

## (undated) DEVICE — AORTIC PNCH 4.0MM 8IN BX 10 DISP

## (undated) DEVICE — DRESSING GERM DIA1IN CNTR H DIA7MM BLU CHG ANTIMIC PROTCT

## (undated) DEVICE — 12 FOOT DISPOSABLE EXTENSION CABLE WITH SAFE CONNECT / SCREW-DOWN

## (undated) DEVICE — SUTURE NONABSORBABLE MONOFILAMENT 5-0 C-1 1X24 IN PROLENE 8725H

## (undated) DEVICE — INTENDED FOR TISSUE SEPARATION, AND OTHER PROCEDURES THAT REQUIRE A SHARP SURGICAL BLADE TO PUNCTURE OR CUT.: Brand: BARD-PARKER ® STAINLESS STEEL BLADES

## (undated) DEVICE — HYPODERMIC SAFETY NEEDLE: Brand: MAGELLAN

## (undated) DEVICE — SYRINGE, LUER LOCK, 30ML: Brand: MEDLINE

## (undated) DEVICE — CONNECTOR PERF W3/8XH0.25XL0.25IN BASE UNEQUAL Y SHP W/O

## (undated) DEVICE — SET TRNQT W/ STD 7IN 12FR 5 TB 1 SNR DLP

## (undated) DEVICE — GOWN SIRUS NONREIN XL W/TWL: Brand: MEDLINE INDUSTRIES, INC.

## (undated) DEVICE — TOWEL 26X17IN 2 PER PACK COTTON DELINTED

## (undated) DEVICE — CANNULA VES L25IN RADPQ BODY W  1 W VLV 3MM BLNT TIP DLP

## (undated) DEVICE — 60 ML SYRINGE,REGULAR TIP: Brand: MONOJECT

## (undated) DEVICE — [HIGH FLOW INSUFFLATOR,  DO NOT USE IF PACKAGE IS DAMAGED,  KEEP DRY,  KEEP AWAY FROM SUNLIGHT,  PROTECT FROM HEAT AND RADIOACTIVE SOURCES.]: Brand: PNEUMOSURE

## (undated) DEVICE — SUTURE PERMA-HAND SZ 2 L60IN NONABSORBABLE BLK SILK BRAID SA8H

## (undated) DEVICE — Z DUP USE 2522782 SOLUTION IRRIG 1000ML STRL H2O PLAS CONTAINER UROMATIC

## (undated) DEVICE — ELECTRODE PT RET AD L9FT HI MOIST COND ADH HYDRGEL CORDED

## (undated) DEVICE — NEEDLE SPNL L3.5IN PNK HUB S STL REG WALL FIT STYL W/ QNCKE

## (undated) DEVICE — SYSTEM ENDOSCP VES HARV W/ TOOL CANN SEAL SHT PRT BLNT TIP

## (undated) DEVICE — OPEN HRT BASIC PK

## (undated) DEVICE — PROCEDURE KIT COMP

## (undated) DEVICE — SUTURE ETHBND SZ 3-0 L30IN NONABSORBABLE GRN SH L26MM 1/2 X562H

## (undated) DEVICE — APPLIER CLP L9.38IN M LIG TI DISP STR RNG HNDL LIGACLP

## (undated) DEVICE — APPLIER CLP L9.375IN APER 2.1MM CLS L3.8MM 20 SM TI CLP

## (undated) DEVICE — PAD THRM M SPL TORSO

## (undated) DEVICE — BANDAGE COMPR W6INXL10YD ST M E WHITE/BEIGE

## (undated) DEVICE — INDICATED FOR SURGICAL CLAMPING DURING CARDIOVASCULAR PERIPHERAL VASCULAR, AND GENERAL SURGERY.: Brand: SOFT/FIBRA® SPRING CLIP

## (undated) DEVICE — SPONGE LAP W18XL18IN WHT COT 4 PLY FLD STRUNG RADPQ DISP ST

## (undated) DEVICE — 3 ML SYRINGE LUER-LOCK TIP: Brand: MONOJECT

## (undated) DEVICE — SUTURE PDS II SZ 0 L27IN ABSRB VLT L36MM CT-1 1/2 CIR Z340H

## (undated) DEVICE — WAX SURG 2.5GM HEMSTAT BNE BEESWAX PARAFFIN ISO PALMITATE